# Patient Record
Sex: FEMALE | Race: WHITE | Employment: OTHER | URBAN - METROPOLITAN AREA
[De-identification: names, ages, dates, MRNs, and addresses within clinical notes are randomized per-mention and may not be internally consistent; named-entity substitution may affect disease eponyms.]

---

## 2019-09-25 ENCOUNTER — HOSPITAL ENCOUNTER (OUTPATIENT)
Age: 69
Discharge: HOME | End: 2019-09-25
Payer: COMMERCIAL

## 2019-09-25 LAB
ALBUMIN SERPL-MCNC: 3.7 G/DL (ref 3.5–5.2)
ALP BLD-CCNC: 106 U/L (ref 35–104)
ALT SERPL-CCNC: 9 U/L (ref 0–32)
ANION GAP SERPL CALCULATED.3IONS-SCNC: 14 MMOL/L (ref 7–16)
AST SERPL-CCNC: 13 U/L (ref 0–31)
BASOPHILS ABSOLUTE: 0.05 E9/L (ref 0–0.2)
BASOPHILS RELATIVE PERCENT: 0.3 % (ref 0–2)
BILIRUB SERPL-MCNC: <0.2 MG/DL (ref 0–1.2)
BUN BLDV-MCNC: 15 MG/DL (ref 8–23)
CALCIUM SERPL-MCNC: 10.4 MG/DL (ref 8.6–10.2)
CHLORIDE BLD-SCNC: 99 MMOL/L (ref 98–107)
CHOLESTEROL, TOTAL: 140 MG/DL (ref 0–199)
CO2: 24 MMOL/L (ref 22–29)
CREAT SERPL-MCNC: 0.5 MG/DL (ref 0.5–1)
EOSINOPHILS ABSOLUTE: 0.17 E9/L (ref 0.05–0.5)
EOSINOPHILS RELATIVE PERCENT: 1.1 % (ref 0–6)
GFR AFRICAN AMERICAN: >60
GFR NON-AFRICAN AMERICAN: >60 ML/MIN/1.73
GLUCOSE BLD-MCNC: 94 MG/DL (ref 74–99)
HCT VFR BLD CALC: 39.8 % (ref 34–48)
HDLC SERPL-MCNC: 41 MG/DL
HEMOGLOBIN: 12.9 G/DL (ref 11.5–15.5)
IMMATURE GRANULOCYTES #: 0.12 E9/L
IMMATURE GRANULOCYTES %: 0.8 % (ref 0–5)
LDL CHOLESTEROL CALCULATED: 67 MG/DL (ref 0–99)
LYMPHOCYTES ABSOLUTE: 2.68 E9/L (ref 1.5–4)
LYMPHOCYTES RELATIVE PERCENT: 17.2 % (ref 20–42)
MCH RBC QN AUTO: 33.4 PG (ref 26–35)
MCHC RBC AUTO-ENTMCNC: 32.4 % (ref 32–34.5)
MCV RBC AUTO: 103.1 FL (ref 80–99.9)
MONOCYTES ABSOLUTE: 0.52 E9/L (ref 0.1–0.95)
MONOCYTES RELATIVE PERCENT: 3.3 % (ref 2–12)
NEUTROPHILS ABSOLUTE: 12.01 E9/L (ref 1.8–7.3)
NEUTROPHILS RELATIVE PERCENT: 77.3 % (ref 43–80)
PDW BLD-RTO: 12.2 FL (ref 11.5–15)
PLATELET # BLD: 614 E9/L (ref 130–450)
PMV BLD AUTO: 8.6 FL (ref 7–12)
POTASSIUM SERPL-SCNC: 3.9 MMOL/L (ref 3.5–5)
RBC # BLD: 3.86 E12/L (ref 3.5–5.5)
SODIUM BLD-SCNC: 137 MMOL/L (ref 132–146)
T4 FREE: 0.97 NG/DL (ref 0.93–1.7)
TOTAL PROTEIN: 8.1 G/DL (ref 6.4–8.3)
TRIGL SERPL-MCNC: 161 MG/DL (ref 0–149)
TSH SERPL DL<=0.05 MIU/L-ACNC: 1.37 UIU/ML (ref 0.27–4.2)
VLDLC SERPL CALC-MCNC: 32 MG/DL
WBC # BLD: 15.6 E9/L (ref 4.5–11.5)

## 2019-09-25 PROCEDURE — 84439 ASSAY OF FREE THYROXINE: CPT

## 2019-09-25 PROCEDURE — 80061 LIPID PANEL: CPT

## 2019-09-25 PROCEDURE — 80053 COMPREHEN METABOLIC PANEL: CPT

## 2019-09-25 PROCEDURE — 36415 COLL VENOUS BLD VENIPUNCTURE: CPT

## 2019-09-25 PROCEDURE — 85025 COMPLETE CBC W/AUTO DIFF WBC: CPT

## 2019-09-25 PROCEDURE — 84443 ASSAY THYROID STIM HORMONE: CPT

## 2019-10-04 ENCOUNTER — HOSPITAL ENCOUNTER (OUTPATIENT)
Dept: GENERAL RADIOLOGY | Age: 69
Discharge: HOME | End: 2019-10-06
Payer: COMMERCIAL

## 2019-10-04 ENCOUNTER — HOSPITAL ENCOUNTER (OUTPATIENT)
Age: 69
Discharge: HOME | End: 2019-10-06
Payer: COMMERCIAL

## 2019-10-04 ENCOUNTER — HOSPITAL ENCOUNTER (OUTPATIENT)
Dept: WOUND CARE | Age: 69
Discharge: HOME | End: 2019-10-04
Payer: COMMERCIAL

## 2019-10-04 VITALS
BODY MASS INDEX: 17.99 KG/M2 | RESPIRATION RATE: 18 BRPM | HEIGHT: 65 IN | SYSTOLIC BLOOD PRESSURE: 106 MMHG | WEIGHT: 108 LBS | HEART RATE: 70 BPM | TEMPERATURE: 98.9 F | DIASTOLIC BLOOD PRESSURE: 70 MMHG

## 2019-10-04 DIAGNOSIS — L03.011 CELLULITIS OF FINGER OF RIGHT HAND: Primary | ICD-10-CM

## 2019-10-04 DIAGNOSIS — R05.9 COUGH: ICD-10-CM

## 2019-10-04 DIAGNOSIS — L03.011 CELLULITIS OF FINGER OF RIGHT HAND: ICD-10-CM

## 2019-10-04 PROCEDURE — 73130 X-RAY EXAM OF HAND: CPT

## 2019-10-04 PROCEDURE — 11042 DBRDMT SUBQ TIS 1ST 20SQCM/<: CPT

## 2019-10-04 PROCEDURE — 87070 CULTURE OTHR SPECIMN AEROBIC: CPT

## 2019-10-04 PROCEDURE — 99203 OFFICE O/P NEW LOW 30 MIN: CPT | Performed by: FAMILY MEDICINE

## 2019-10-04 PROCEDURE — 99213 OFFICE O/P EST LOW 20 MIN: CPT

## 2019-10-04 PROCEDURE — 11042 DBRDMT SUBQ TIS 1ST 20SQCM/<: CPT | Performed by: FAMILY MEDICINE

## 2019-10-04 PROCEDURE — 87075 CULTR BACTERIA EXCEPT BLOOD: CPT

## 2019-10-04 RX ORDER — BUPRENORPHINE HYDROCHLORIDE, NALOXONE HYDROCHLORIDE 8; 2 MG/1; MG/1
1 FILM, SOLUBLE BUCCAL; SUBLINGUAL 3 TIMES DAILY
COMMUNITY

## 2019-10-04 ASSESSMENT — PAIN SCALES - GENERAL: PAINLEVEL_OUTOF10: 8

## 2019-10-04 ASSESSMENT — PAIN DESCRIPTION - ONSET: ONSET: ON-GOING

## 2019-10-04 ASSESSMENT — PAIN DESCRIPTION - PAIN TYPE: TYPE: ACUTE PAIN

## 2019-10-04 ASSESSMENT — PAIN DESCRIPTION - LOCATION: LOCATION: FINGER (COMMENT WHICH ONE)

## 2019-10-04 ASSESSMENT — PAIN DESCRIPTION - FREQUENCY: FREQUENCY: CONTINUOUS

## 2019-10-04 ASSESSMENT — PAIN DESCRIPTION - DESCRIPTORS: DESCRIPTORS: ACHING;CRUSHING;DISCOMFORT

## 2019-10-04 ASSESSMENT — PAIN DESCRIPTION - PROGRESSION: CLINICAL_PROGRESSION: NOT CHANGED

## 2019-10-04 ASSESSMENT — PAIN DESCRIPTION - ORIENTATION: ORIENTATION: RIGHT

## 2019-10-06 LAB — WOUND/ABSCESS: NORMAL

## 2019-10-09 LAB — ANAEROBIC CULTURE: NORMAL

## 2019-10-11 ENCOUNTER — HOSPITAL ENCOUNTER (OUTPATIENT)
Dept: WOUND CARE | Age: 69
Discharge: HOME | DRG: 710 | End: 2019-10-11
Payer: COMMERCIAL

## 2019-10-11 ENCOUNTER — APPOINTMENT (OUTPATIENT)
Dept: GENERAL RADIOLOGY | Age: 69
DRG: 710 | End: 2019-10-11
Payer: COMMERCIAL

## 2019-10-11 ENCOUNTER — TELEPHONE (OUTPATIENT)
Dept: ORTHOPEDIC SURGERY | Age: 69
End: 2019-10-11

## 2019-10-11 ENCOUNTER — HOSPITAL ENCOUNTER (INPATIENT)
Age: 69
LOS: 4 days | Discharge: HOME | DRG: 710 | End: 2019-10-15
Attending: EMERGENCY MEDICINE | Admitting: INTERNAL MEDICINE
Payer: COMMERCIAL

## 2019-10-11 VITALS
SYSTOLIC BLOOD PRESSURE: 118 MMHG | TEMPERATURE: 98.1 F | RESPIRATION RATE: 18 BRPM | HEART RATE: 90 BPM | DIASTOLIC BLOOD PRESSURE: 78 MMHG

## 2019-10-11 DIAGNOSIS — M65.9 TENOSYNOVITIS: Primary | ICD-10-CM

## 2019-10-11 PROBLEM — M65.90 TENOSYNOVITIS: Status: ACTIVE | Noted: 2019-10-11

## 2019-10-11 LAB
ABO/RH: NORMAL
ALBUMIN SERPL-MCNC: 4 G/DL (ref 3.5–5.2)
ALP BLD-CCNC: 91 U/L (ref 35–104)
ALT SERPL-CCNC: 10 U/L (ref 0–32)
ANION GAP SERPL CALCULATED.3IONS-SCNC: 11 MMOL/L (ref 7–16)
ANTIBODY IDENTIFICATION: NORMAL
ANTIBODY SCREEN: NORMAL
APTT: 38.2 SEC (ref 24.5–35.1)
AST SERPL-CCNC: 15 U/L (ref 0–31)
BASOPHILS ABSOLUTE: 0.04 E9/L (ref 0–0.2)
BASOPHILS RELATIVE PERCENT: 0.4 % (ref 0–2)
BILIRUB SERPL-MCNC: <0.2 MG/DL (ref 0–1.2)
BUN BLDV-MCNC: 15 MG/DL (ref 8–23)
C ANTIGEN: NORMAL
C-REACTIVE PROTEIN: 1.1 MG/DL (ref 0–0.4)
CALCIUM SERPL-MCNC: 10.3 MG/DL (ref 8.6–10.2)
CHLORIDE BLD-SCNC: 103 MMOL/L (ref 98–107)
CHP ED QC CHECK: YES
CO2: 25 MMOL/L (ref 22–29)
CREAT SERPL-MCNC: 0.6 MG/DL (ref 0.5–1)
DAT POLYSPECIFIC: NORMAL
DR. NOTIFY: NORMAL
E ANTIGEN: NORMAL
EOSINOPHILS ABSOLUTE: 0.09 E9/L (ref 0.05–0.5)
EOSINOPHILS RELATIVE PERCENT: 0.9 % (ref 0–6)
GFR AFRICAN AMERICAN: >60
GFR NON-AFRICAN AMERICAN: >60 ML/MIN/1.73
GLUCOSE BLD-MCNC: 118 MG/DL (ref 74–99)
GLUCOSE BLD-MCNC: 155 MG/DL
HCT VFR BLD CALC: 36.8 % (ref 34–48)
HEMOGLOBIN: 12.1 G/DL (ref 11.5–15.5)
IMMATURE GRANULOCYTES #: 0.04 E9/L
IMMATURE GRANULOCYTES %: 0.4 % (ref 0–5)
INR BLD: 1
LYMPHOCYTES ABSOLUTE: 2.48 E9/L (ref 1.5–4)
LYMPHOCYTES RELATIVE PERCENT: 24.5 % (ref 20–42)
MCH RBC QN AUTO: 33.7 PG (ref 26–35)
MCHC RBC AUTO-ENTMCNC: 32.9 % (ref 32–34.5)
MCV RBC AUTO: 102.5 FL (ref 80–99.9)
METER GLUCOSE: 155 MG/DL (ref 74–99)
MONOCYTES ABSOLUTE: 0.74 E9/L (ref 0.1–0.95)
MONOCYTES RELATIVE PERCENT: 7.3 % (ref 2–12)
NEUTROPHILS ABSOLUTE: 6.73 E9/L (ref 1.8–7.3)
NEUTROPHILS RELATIVE PERCENT: 66.5 % (ref 43–80)
PDW BLD-RTO: 12.9 FL (ref 11.5–15)
PLATELET # BLD: 433 E9/L (ref 130–450)
PMV BLD AUTO: 8.8 FL (ref 7–12)
POTASSIUM SERPL-SCNC: 4.1 MMOL/L (ref 3.5–5)
PROTHROMBIN TIME: 11.4 SEC (ref 9.3–12.4)
RBC # BLD: 3.59 E12/L (ref 3.5–5.5)
SEDIMENTATION RATE, ERYTHROCYTE: 69 MM/HR (ref 0–20)
SODIUM BLD-SCNC: 139 MMOL/L (ref 132–146)
TOTAL CK: 28 U/L (ref 20–180)
TOTAL PROTEIN: 7.5 G/DL (ref 6.4–8.3)
WBC # BLD: 10.1 E9/L (ref 4.5–11.5)

## 2019-10-11 PROCEDURE — 6360000002 HC RX W HCPCS: Performed by: STUDENT IN AN ORGANIZED HEALTH CARE EDUCATION/TRAINING PROGRAM

## 2019-10-11 PROCEDURE — 6370000000 HC RX 637 (ALT 250 FOR IP): Performed by: INTERNAL MEDICINE

## 2019-10-11 PROCEDURE — 93005 ELECTROCARDIOGRAM TRACING: CPT

## 2019-10-11 PROCEDURE — 73130 X-RAY EXAM OF HAND: CPT

## 2019-10-11 PROCEDURE — 99212 OFFICE O/P EST SF 10 MIN: CPT

## 2019-10-11 PROCEDURE — 86850 RBC ANTIBODY SCREEN: CPT

## 2019-10-11 PROCEDURE — 2580000003 HC RX 258: Performed by: INTERNAL MEDICINE

## 2019-10-11 PROCEDURE — 86922 COMPATIBILITY TEST ANTIGLOB: CPT

## 2019-10-11 PROCEDURE — 36415 COLL VENOUS BLD VENIPUNCTURE: CPT

## 2019-10-11 PROCEDURE — 82550 ASSAY OF CK (CPK): CPT

## 2019-10-11 PROCEDURE — 85610 PROTHROMBIN TIME: CPT

## 2019-10-11 PROCEDURE — 96365 THER/PROPH/DIAG IV INF INIT: CPT

## 2019-10-11 PROCEDURE — 87070 CULTURE OTHR SPECIMN AEROBIC: CPT

## 2019-10-11 PROCEDURE — 86905 BLOOD TYPING RBC ANTIGENS: CPT

## 2019-10-11 PROCEDURE — 96375 TX/PRO/DX INJ NEW DRUG ADDON: CPT

## 2019-10-11 PROCEDURE — 86880 COOMBS TEST DIRECT: CPT

## 2019-10-11 PROCEDURE — 85025 COMPLETE CBC W/AUTO DIFF WBC: CPT

## 2019-10-11 PROCEDURE — 99213 OFFICE O/P EST LOW 20 MIN: CPT | Performed by: FAMILY MEDICINE

## 2019-10-11 PROCEDURE — 2580000003 HC RX 258: Performed by: STUDENT IN AN ORGANIZED HEALTH CARE EDUCATION/TRAINING PROGRAM

## 2019-10-11 PROCEDURE — 85730 THROMBOPLASTIN TIME PARTIAL: CPT

## 2019-10-11 PROCEDURE — 85651 RBC SED RATE NONAUTOMATED: CPT

## 2019-10-11 PROCEDURE — 87040 BLOOD CULTURE FOR BACTERIA: CPT

## 2019-10-11 PROCEDURE — 86870 RBC ANTIBODY IDENTIFICATION: CPT

## 2019-10-11 PROCEDURE — 87075 CULTR BACTERIA EXCEPT BLOOD: CPT

## 2019-10-11 PROCEDURE — 1200000000 HC SEMI PRIVATE

## 2019-10-11 PROCEDURE — 80053 COMPREHEN METABOLIC PANEL: CPT

## 2019-10-11 PROCEDURE — 82962 GLUCOSE BLOOD TEST: CPT

## 2019-10-11 PROCEDURE — 96376 TX/PRO/DX INJ SAME DRUG ADON: CPT

## 2019-10-11 PROCEDURE — 99285 EMERGENCY DEPT VISIT HI MDM: CPT

## 2019-10-11 PROCEDURE — 6360000002 HC RX W HCPCS: Performed by: INTERNAL MEDICINE

## 2019-10-11 PROCEDURE — 86140 C-REACTIVE PROTEIN: CPT

## 2019-10-11 PROCEDURE — 86901 BLOOD TYPING SEROLOGIC RH(D): CPT

## 2019-10-11 PROCEDURE — 96367 TX/PROPH/DG ADDL SEQ IV INF: CPT

## 2019-10-11 PROCEDURE — 86900 BLOOD TYPING SEROLOGIC ABO: CPT

## 2019-10-11 PROCEDURE — 99223 1ST HOSP IP/OBS HIGH 75: CPT | Performed by: ORTHOPAEDIC SURGERY

## 2019-10-11 RX ORDER — MORPHINE SULFATE 4 MG/ML
4 INJECTION, SOLUTION INTRAMUSCULAR; INTRAVENOUS ONCE
Status: COMPLETED | OUTPATIENT
Start: 2019-10-11 | End: 2019-10-11

## 2019-10-11 RX ORDER — SODIUM CHLORIDE 0.9 % (FLUSH) 0.9 %
10 SYRINGE (ML) INJECTION EVERY 12 HOURS SCHEDULED
Status: DISCONTINUED | OUTPATIENT
Start: 2019-10-11 | End: 2019-10-15 | Stop reason: HOSPADM

## 2019-10-11 RX ORDER — DEXTROSE MONOHYDRATE 50 MG/ML
100 INJECTION, SOLUTION INTRAVENOUS PRN
Status: DISCONTINUED | OUTPATIENT
Start: 2019-10-11 | End: 2019-10-15 | Stop reason: HOSPADM

## 2019-10-11 RX ORDER — SODIUM CHLORIDE 0.9 % (FLUSH) 0.9 %
10 SYRINGE (ML) INJECTION PRN
Status: DISCONTINUED | OUTPATIENT
Start: 2019-10-11 | End: 2019-10-15 | Stop reason: HOSPADM

## 2019-10-11 RX ORDER — DEXTROSE MONOHYDRATE 25 G/50ML
12.5 INJECTION, SOLUTION INTRAVENOUS PRN
Status: DISCONTINUED | OUTPATIENT
Start: 2019-10-11 | End: 2019-10-15 | Stop reason: HOSPADM

## 2019-10-11 RX ORDER — BUPRENORPHINE HYDROCHLORIDE AND NALOXONE HYDROCHLORIDE DIHYDRATE 8; 2 MG/1; MG/1
1 TABLET SUBLINGUAL 2 TIMES DAILY
Status: DISCONTINUED | OUTPATIENT
Start: 2019-10-11 | End: 2019-10-15 | Stop reason: HOSPADM

## 2019-10-11 RX ORDER — ACETAMINOPHEN 325 MG/1
650 TABLET ORAL EVERY 4 HOURS PRN
Status: DISCONTINUED | OUTPATIENT
Start: 2019-10-11 | End: 2019-10-15 | Stop reason: HOSPADM

## 2019-10-11 RX ORDER — NICOTINE POLACRILEX 4 MG
15 LOZENGE BUCCAL PRN
Status: DISCONTINUED | OUTPATIENT
Start: 2019-10-11 | End: 2019-10-15 | Stop reason: HOSPADM

## 2019-10-11 RX ORDER — MORPHINE SULFATE 2 MG/ML
2 INJECTION, SOLUTION INTRAMUSCULAR; INTRAVENOUS EVERY 6 HOURS PRN
Status: DISCONTINUED | OUTPATIENT
Start: 2019-10-11 | End: 2019-10-14 | Stop reason: CLARIF

## 2019-10-11 RX ORDER — LIDOCAINE HYDROCHLORIDE 20 MG/ML
JELLY TOPICAL ONCE
Status: DISCONTINUED | OUTPATIENT
Start: 2019-10-11 | End: 2019-10-12 | Stop reason: HOSPADM

## 2019-10-11 RX ORDER — OXYCODONE HYDROCHLORIDE AND ACETAMINOPHEN 5; 325 MG/1; MG/1
1 TABLET ORAL ONCE
Status: DISCONTINUED | OUTPATIENT
Start: 2019-10-11 | End: 2019-10-11

## 2019-10-11 RX ADMIN — BUPRENORPHINE AND NALOXONE 1 TABLET: 8; 2 TABLET SUBLINGUAL at 22:09

## 2019-10-11 RX ADMIN — PIPERACILLIN AND TAZOBACTAM 3.38 G: 3; .375 INJECTION, POWDER, FOR SOLUTION INTRAVENOUS at 12:40

## 2019-10-11 RX ADMIN — MORPHINE SULFATE 4 MG: 4 INJECTION INTRAVENOUS at 18:37

## 2019-10-11 RX ADMIN — MORPHINE SULFATE 4 MG: 4 INJECTION INTRAVENOUS at 14:24

## 2019-10-11 RX ADMIN — Medication 10 ML: at 22:52

## 2019-10-11 RX ADMIN — ACETAMINOPHEN 650 MG: 325 TABLET, FILM COATED ORAL at 22:52

## 2019-10-11 RX ADMIN — PIPERACILLIN AND TAZOBACTAM 3.38 G: 3; .375 INJECTION, POWDER, FOR SOLUTION INTRAVENOUS at 22:52

## 2019-10-11 RX ADMIN — MORPHINE SULFATE 4 MG: 4 INJECTION INTRAVENOUS at 12:38

## 2019-10-11 RX ADMIN — VANCOMYCIN HYDROCHLORIDE 1000 MG: 1 INJECTION, POWDER, LYOPHILIZED, FOR SOLUTION INTRAVENOUS at 13:19

## 2019-10-11 ASSESSMENT — ENCOUNTER SYMPTOMS
VOMITING: 0
SINUS PRESSURE: 0
SORE THROAT: 0
EYE DISCHARGE: 0
DIARRHEA: 0
NAUSEA: 0
EYE PAIN: 0
ABDOMINAL DISTENTION: 0
SHORTNESS OF BREATH: 0
WHEEZING: 0
COUGH: 0
EYE REDNESS: 0
BACK PAIN: 0

## 2019-10-11 ASSESSMENT — PAIN SCALES - GENERAL
PAINLEVEL_OUTOF10: 8
PAINLEVEL_OUTOF10: 10
PAINLEVEL_OUTOF10: 8
PAINLEVEL_OUTOF10: 9
PAINLEVEL_OUTOF10: 10
PAINLEVEL_OUTOF10: 9

## 2019-10-11 ASSESSMENT — PAIN DESCRIPTION - PAIN TYPE
TYPE: ACUTE PAIN
TYPE: CHRONIC PAIN
TYPE: ACUTE PAIN

## 2019-10-11 ASSESSMENT — PAIN DESCRIPTION - ORIENTATION
ORIENTATION: RIGHT

## 2019-10-11 ASSESSMENT — PAIN DESCRIPTION - DESCRIPTORS
DESCRIPTORS: ACHING;DISCOMFORT;THROBBING
DESCRIPTORS: ACHING;DISCOMFORT;CONSTANT
DESCRIPTORS: CONSTANT;SHOOTING

## 2019-10-11 ASSESSMENT — PAIN DESCRIPTION - LOCATION
LOCATION: FINGER (COMMENT WHICH ONE)
LOCATION: FINGER (COMMENT WHICH ONE)
LOCATION: HAND

## 2019-10-11 ASSESSMENT — PAIN DESCRIPTION - FREQUENCY: FREQUENCY: CONTINUOUS

## 2019-10-11 ASSESSMENT — PAIN - FUNCTIONAL ASSESSMENT: PAIN_FUNCTIONAL_ASSESSMENT: PREVENTS OR INTERFERES SOME ACTIVE ACTIVITIES AND ADLS

## 2019-10-12 ENCOUNTER — ANESTHESIA EVENT (OUTPATIENT)
Dept: OPERATING ROOM | Age: 69
DRG: 710 | End: 2019-10-12
Payer: COMMERCIAL

## 2019-10-12 ENCOUNTER — ANESTHESIA (OUTPATIENT)
Dept: OPERATING ROOM | Age: 69
DRG: 710 | End: 2019-10-12
Payer: COMMERCIAL

## 2019-10-12 ENCOUNTER — APPOINTMENT (OUTPATIENT)
Dept: GENERAL RADIOLOGY | Age: 69
DRG: 710 | End: 2019-10-12
Payer: COMMERCIAL

## 2019-10-12 VITALS
DIASTOLIC BLOOD PRESSURE: 51 MMHG | RESPIRATION RATE: 22 BRPM | OXYGEN SATURATION: 98 % | SYSTOLIC BLOOD PRESSURE: 94 MMHG

## 2019-10-12 LAB
ANION GAP SERPL CALCULATED.3IONS-SCNC: 8 MMOL/L (ref 7–16)
BUN BLDV-MCNC: 19 MG/DL (ref 8–23)
CALCIUM SERPL-MCNC: 9.5 MG/DL (ref 8.6–10.2)
CHLORIDE BLD-SCNC: 104 MMOL/L (ref 98–107)
CHOLESTEROL, TOTAL: 153 MG/DL (ref 0–199)
CO2: 26 MMOL/L (ref 22–29)
CREAT SERPL-MCNC: 0.5 MG/DL (ref 0.5–1)
GFR AFRICAN AMERICAN: >60
GFR NON-AFRICAN AMERICAN: >60 ML/MIN/1.73
GLUCOSE BLD-MCNC: 98 MG/DL (ref 74–99)
HBA1C MFR BLD: 6.3 % (ref 4–5.6)
HCT VFR BLD CALC: 32.5 % (ref 34–48)
HDLC SERPL-MCNC: 40 MG/DL
HEMOGLOBIN: 10.5 G/DL (ref 11.5–15.5)
LDL CHOLESTEROL CALCULATED: 82 MG/DL (ref 0–99)
MAGNESIUM: 2.1 MG/DL (ref 1.6–2.6)
MCH RBC QN AUTO: 33.4 PG (ref 26–35)
MCHC RBC AUTO-ENTMCNC: 32.3 % (ref 32–34.5)
MCV RBC AUTO: 103.5 FL (ref 80–99.9)
METER GLUCOSE: 121 MG/DL (ref 74–99)
METER GLUCOSE: 90 MG/DL (ref 74–99)
PDW BLD-RTO: 13 FL (ref 11.5–15)
PHOSPHORUS: 3.9 MG/DL (ref 2.5–4.5)
PLATELET # BLD: 387 E9/L (ref 130–450)
PMV BLD AUTO: 8.9 FL (ref 7–12)
POTASSIUM SERPL-SCNC: 4 MMOL/L (ref 3.5–5)
RBC # BLD: 3.14 E12/L (ref 3.5–5.5)
SODIUM BLD-SCNC: 138 MMOL/L (ref 132–146)
TRIGL SERPL-MCNC: 156 MG/DL (ref 0–149)
TSH SERPL DL<=0.05 MIU/L-ACNC: 1.9 UIU/ML (ref 0.27–4.2)
VLDLC SERPL CALC-MCNC: 31 MG/DL
WBC # BLD: 8 E9/L (ref 4.5–11.5)

## 2019-10-12 PROCEDURE — 85027 COMPLETE CBC AUTOMATED: CPT

## 2019-10-12 PROCEDURE — 83735 ASSAY OF MAGNESIUM: CPT

## 2019-10-12 PROCEDURE — 6360000002 HC RX W HCPCS: Performed by: INTERNAL MEDICINE

## 2019-10-12 PROCEDURE — 0JBJ0ZZ EXCISION OF RIGHT HAND SUBCUTANEOUS TISSUE AND FASCIA, OPEN APPROACH: ICD-10-PCS | Performed by: ORTHOPAEDIC SURGERY

## 2019-10-12 PROCEDURE — 87075 CULTR BACTERIA EXCEPT BLOOD: CPT

## 2019-10-12 PROCEDURE — 3600000012 HC SURGERY LEVEL 2 ADDTL 15MIN: Performed by: ORTHOPAEDIC SURGERY

## 2019-10-12 PROCEDURE — 3600000002 HC SURGERY LEVEL 2 BASE: Performed by: ORTHOPAEDIC SURGERY

## 2019-10-12 PROCEDURE — 87070 CULTURE OTHR SPECIMN AEROBIC: CPT

## 2019-10-12 PROCEDURE — 6370000000 HC RX 637 (ALT 250 FOR IP): Performed by: STUDENT IN AN ORGANIZED HEALTH CARE EDUCATION/TRAINING PROGRAM

## 2019-10-12 PROCEDURE — 26011 DRAINAGE OF FINGER ABSCESS: CPT | Performed by: ORTHOPAEDIC SURGERY

## 2019-10-12 PROCEDURE — 7100000000 HC PACU RECOVERY - FIRST 15 MIN: Performed by: ORTHOPAEDIC SURGERY

## 2019-10-12 PROCEDURE — 2580000003 HC RX 258: Performed by: INTERNAL MEDICINE

## 2019-10-12 PROCEDURE — 1200000000 HC SEMI PRIVATE

## 2019-10-12 PROCEDURE — 3700000001 HC ADD 15 MINUTES (ANESTHESIA): Performed by: ORTHOPAEDIC SURGERY

## 2019-10-12 PROCEDURE — 87205 SMEAR GRAM STAIN: CPT

## 2019-10-12 PROCEDURE — 7100000001 HC PACU RECOVERY - ADDTL 15 MIN: Performed by: ORTHOPAEDIC SURGERY

## 2019-10-12 PROCEDURE — 71045 X-RAY EXAM CHEST 1 VIEW: CPT

## 2019-10-12 PROCEDURE — 2580000003 HC RX 258: Performed by: STUDENT IN AN ORGANIZED HEALTH CARE EDUCATION/TRAINING PROGRAM

## 2019-10-12 PROCEDURE — 36415 COLL VENOUS BLD VENIPUNCTURE: CPT

## 2019-10-12 PROCEDURE — 26020 DRAIN HAND TENDON SHEATH: CPT | Performed by: ORTHOPAEDIC SURGERY

## 2019-10-12 PROCEDURE — 80061 LIPID PANEL: CPT

## 2019-10-12 PROCEDURE — 3700000000 HC ANESTHESIA ATTENDED CARE: Performed by: ORTHOPAEDIC SURGERY

## 2019-10-12 PROCEDURE — 84443 ASSAY THYROID STIM HORMONE: CPT

## 2019-10-12 PROCEDURE — 6370000000 HC RX 637 (ALT 250 FOR IP): Performed by: INTERNAL MEDICINE

## 2019-10-12 PROCEDURE — 83036 HEMOGLOBIN GLYCOSYLATED A1C: CPT

## 2019-10-12 PROCEDURE — 6360000002 HC RX W HCPCS: Performed by: STUDENT IN AN ORGANIZED HEALTH CARE EDUCATION/TRAINING PROGRAM

## 2019-10-12 PROCEDURE — 2580000003 HC RX 258: Performed by: NURSE ANESTHETIST, CERTIFIED REGISTERED

## 2019-10-12 PROCEDURE — 2709999900 HC NON-CHARGEABLE SUPPLY: Performed by: ORTHOPAEDIC SURGERY

## 2019-10-12 PROCEDURE — 99232 SBSQ HOSP IP/OBS MODERATE 35: CPT | Performed by: ORTHOPAEDIC SURGERY

## 2019-10-12 PROCEDURE — 6360000002 HC RX W HCPCS: Performed by: NURSE ANESTHETIST, CERTIFIED REGISTERED

## 2019-10-12 PROCEDURE — 84100 ASSAY OF PHOSPHORUS: CPT

## 2019-10-12 PROCEDURE — 2500000003 HC RX 250 WO HCPCS: Performed by: ORTHOPAEDIC SURGERY

## 2019-10-12 PROCEDURE — 82962 GLUCOSE BLOOD TEST: CPT

## 2019-10-12 PROCEDURE — 80048 BASIC METABOLIC PNL TOTAL CA: CPT

## 2019-10-12 PROCEDURE — 6360000002 HC RX W HCPCS

## 2019-10-12 RX ORDER — PROPOFOL 10 MG/ML
INJECTION, EMULSION INTRAVENOUS CONTINUOUS PRN
Status: DISCONTINUED | OUTPATIENT
Start: 2019-10-12 | End: 2019-10-12 | Stop reason: SDUPTHER

## 2019-10-12 RX ORDER — MIDAZOLAM HYDROCHLORIDE 1 MG/ML
1 INJECTION INTRAMUSCULAR; INTRAVENOUS ONCE
Status: COMPLETED | OUTPATIENT
Start: 2019-10-12 | End: 2019-10-12

## 2019-10-12 RX ORDER — DIPHENHYDRAMINE HYDROCHLORIDE 50 MG/ML
25 INJECTION INTRAMUSCULAR; INTRAVENOUS EVERY 6 HOURS PRN
Status: DISCONTINUED | OUTPATIENT
Start: 2019-10-12 | End: 2019-10-15 | Stop reason: HOSPADM

## 2019-10-12 RX ORDER — BUPIVACAINE HYDROCHLORIDE 5 MG/ML
INJECTION, SOLUTION PERINEURAL PRN
Status: DISCONTINUED | OUTPATIENT
Start: 2019-10-12 | End: 2019-10-12 | Stop reason: ALTCHOICE

## 2019-10-12 RX ORDER — SODIUM CHLORIDE 0.9 % (FLUSH) 0.9 %
10 SYRINGE (ML) INJECTION EVERY 12 HOURS SCHEDULED
Status: DISCONTINUED | OUTPATIENT
Start: 2019-10-12 | End: 2019-10-12 | Stop reason: SDUPTHER

## 2019-10-12 RX ORDER — SODIUM CHLORIDE 0.9 % (FLUSH) 0.9 %
10 SYRINGE (ML) INJECTION PRN
Status: DISCONTINUED | OUTPATIENT
Start: 2019-10-12 | End: 2019-10-12 | Stop reason: SDUPTHER

## 2019-10-12 RX ORDER — HYDROCODONE BITARTRATE AND ACETAMINOPHEN 5; 325 MG/1; MG/1
2 TABLET ORAL EVERY 4 HOURS PRN
Status: DISCONTINUED | OUTPATIENT
Start: 2019-10-12 | End: 2019-10-12 | Stop reason: SDUPTHER

## 2019-10-12 RX ORDER — ONDANSETRON 2 MG/ML
4 INJECTION INTRAMUSCULAR; INTRAVENOUS
Status: DISCONTINUED | OUTPATIENT
Start: 2019-10-12 | End: 2019-10-12

## 2019-10-12 RX ORDER — PROPOFOL 10 MG/ML
INJECTION, EMULSION INTRAVENOUS PRN
Status: DISCONTINUED | OUTPATIENT
Start: 2019-10-12 | End: 2019-10-12 | Stop reason: SDUPTHER

## 2019-10-12 RX ORDER — SODIUM CHLORIDE, SODIUM LACTATE, POTASSIUM CHLORIDE, CALCIUM CHLORIDE 600; 310; 30; 20 MG/100ML; MG/100ML; MG/100ML; MG/100ML
INJECTION, SOLUTION INTRAVENOUS CONTINUOUS PRN
Status: DISCONTINUED | OUTPATIENT
Start: 2019-10-12 | End: 2019-10-12 | Stop reason: SDUPTHER

## 2019-10-12 RX ORDER — MIDAZOLAM HYDROCHLORIDE 1 MG/ML
INJECTION INTRAMUSCULAR; INTRAVENOUS PRN
Status: DISCONTINUED | OUTPATIENT
Start: 2019-10-12 | End: 2019-10-12 | Stop reason: SDUPTHER

## 2019-10-12 RX ORDER — MIDAZOLAM HYDROCHLORIDE 1 MG/ML
INJECTION INTRAMUSCULAR; INTRAVENOUS
Status: COMPLETED
Start: 2019-10-12 | End: 2019-10-12

## 2019-10-12 RX ORDER — LIDOCAINE HYDROCHLORIDE 20 MG/ML
INJECTION, SOLUTION INTRAVENOUS PRN
Status: DISCONTINUED | OUTPATIENT
Start: 2019-10-12 | End: 2019-10-12 | Stop reason: SDUPTHER

## 2019-10-12 RX ORDER — HYDROCODONE BITARTRATE AND ACETAMINOPHEN 5; 325 MG/1; MG/1
1 TABLET ORAL EVERY 4 HOURS PRN
Status: DISCONTINUED | OUTPATIENT
Start: 2019-10-12 | End: 2019-10-12 | Stop reason: SDUPTHER

## 2019-10-12 RX ORDER — ONDANSETRON 2 MG/ML
4 INJECTION INTRAMUSCULAR; INTRAVENOUS EVERY 6 HOURS PRN
Status: DISCONTINUED | OUTPATIENT
Start: 2019-10-12 | End: 2019-10-15 | Stop reason: HOSPADM

## 2019-10-12 RX ORDER — DOCUSATE SODIUM 100 MG/1
100 CAPSULE, LIQUID FILLED ORAL 2 TIMES DAILY
Status: DISCONTINUED | OUTPATIENT
Start: 2019-10-12 | End: 2019-10-15 | Stop reason: HOSPADM

## 2019-10-12 RX ADMIN — WATER 2 G: 1 INJECTION INTRAMUSCULAR; INTRAVENOUS; SUBCUTANEOUS at 10:08

## 2019-10-12 RX ADMIN — MIDAZOLAM 1 MG: 1 INJECTION INTRAMUSCULAR; INTRAVENOUS at 16:31

## 2019-10-12 RX ADMIN — VANCOMYCIN HYDROCHLORIDE 750 MG: 10 INJECTION, POWDER, LYOPHILIZED, FOR SOLUTION INTRAVENOUS at 14:05

## 2019-10-12 RX ADMIN — ACETAMINOPHEN 650 MG: 325 TABLET, FILM COATED ORAL at 02:46

## 2019-10-12 RX ADMIN — LIDOCAINE HYDROCHLORIDE 20 MG: 20 INJECTION, SOLUTION INTRAVENOUS at 16:38

## 2019-10-12 RX ADMIN — PROPOFOL 100 MCG/KG/MIN: 10 INJECTION, EMULSION INTRAVENOUS at 16:59

## 2019-10-12 RX ADMIN — VANCOMYCIN HYDROCHLORIDE 750 MG: 10 INJECTION, POWDER, LYOPHILIZED, FOR SOLUTION INTRAVENOUS at 02:46

## 2019-10-12 RX ADMIN — MIDAZOLAM HYDROCHLORIDE 1 MG: 1 INJECTION INTRAMUSCULAR; INTRAVENOUS at 16:04

## 2019-10-12 RX ADMIN — PROPOFOL 30 MG: 10 INJECTION, EMULSION INTRAVENOUS at 16:45

## 2019-10-12 RX ADMIN — DOCUSATE SODIUM 100 MG: 100 CAPSULE, LIQUID FILLED ORAL at 20:06

## 2019-10-12 RX ADMIN — DIPHENHYDRAMINE HYDROCHLORIDE 25 MG: 50 INJECTION INTRAMUSCULAR; INTRAVENOUS at 22:19

## 2019-10-12 RX ADMIN — BUPRENORPHINE AND NALOXONE 1 TABLET: 8; 2 TABLET SUBLINGUAL at 10:07

## 2019-10-12 RX ADMIN — ACETAMINOPHEN 650 MG: 325 TABLET, FILM COATED ORAL at 20:06

## 2019-10-12 RX ADMIN — PIPERACILLIN AND TAZOBACTAM 3.38 G: 3; .375 INJECTION, POWDER, FOR SOLUTION INTRAVENOUS at 06:08

## 2019-10-12 RX ADMIN — Medication 10 ML: at 10:09

## 2019-10-12 RX ADMIN — MIDAZOLAM 1 MG: 1 INJECTION INTRAMUSCULAR; INTRAVENOUS at 16:04

## 2019-10-12 RX ADMIN — SODIUM CHLORIDE, POTASSIUM CHLORIDE, SODIUM LACTATE AND CALCIUM CHLORIDE: 600; 310; 30; 20 INJECTION, SOLUTION INTRAVENOUS at 16:31

## 2019-10-12 RX ADMIN — BUPRENORPHINE AND NALOXONE 1 TABLET: 8; 2 TABLET SUBLINGUAL at 20:06

## 2019-10-12 RX ADMIN — PROPOFOL 70 MG: 10 INJECTION, EMULSION INTRAVENOUS at 16:38

## 2019-10-12 RX ADMIN — Medication 10 ML: at 20:08

## 2019-10-12 RX ADMIN — MIDAZOLAM 2 MG: 1 INJECTION INTRAMUSCULAR; INTRAVENOUS at 16:46

## 2019-10-12 RX ADMIN — PROPOFOL 75 MCG/KG/MIN: 10 INJECTION, EMULSION INTRAVENOUS at 16:38

## 2019-10-12 ASSESSMENT — PAIN DESCRIPTION - LOCATION
LOCATION: FINGER (COMMENT WHICH ONE)

## 2019-10-12 ASSESSMENT — PULMONARY FUNCTION TESTS
PIF_VALUE: 0
PIF_VALUE: 0
PIF_VALUE: 1
PIF_VALUE: 0
PIF_VALUE: 1
PIF_VALUE: 1
PIF_VALUE: 0
PIF_VALUE: 0
PIF_VALUE: 1
PIF_VALUE: 0
PIF_VALUE: 0
PIF_VALUE: 1
PIF_VALUE: 0
PIF_VALUE: 0
PIF_VALUE: 3
PIF_VALUE: 1
PIF_VALUE: 0
PIF_VALUE: 0
PIF_VALUE: 1
PIF_VALUE: 0
PIF_VALUE: 0
PIF_VALUE: 1
PIF_VALUE: 0

## 2019-10-12 ASSESSMENT — PAIN DESCRIPTION - DESCRIPTORS
DESCRIPTORS: THROBBING
DESCRIPTORS: ACHING;CONSTANT;DISCOMFORT
DESCRIPTORS: ACHING;CONSTANT;DISCOMFORT

## 2019-10-12 ASSESSMENT — PAIN - FUNCTIONAL ASSESSMENT
PAIN_FUNCTIONAL_ASSESSMENT: PREVENTS OR INTERFERES SOME ACTIVE ACTIVITIES AND ADLS
PAIN_FUNCTIONAL_ASSESSMENT: PREVENTS OR INTERFERES SOME ACTIVE ACTIVITIES AND ADLS

## 2019-10-12 ASSESSMENT — PAIN SCALES - GENERAL
PAINLEVEL_OUTOF10: 10
PAINLEVEL_OUTOF10: 7
PAINLEVEL_OUTOF10: 10
PAINLEVEL_OUTOF10: 0
PAINLEVEL_OUTOF10: 0
PAINLEVEL_OUTOF10: 10
PAINLEVEL_OUTOF10: 8
PAINLEVEL_OUTOF10: 6

## 2019-10-12 ASSESSMENT — PAIN DESCRIPTION - FREQUENCY
FREQUENCY: CONTINUOUS

## 2019-10-12 ASSESSMENT — PAIN DESCRIPTION - PROGRESSION
CLINICAL_PROGRESSION: NOT CHANGED

## 2019-10-12 ASSESSMENT — PAIN DESCRIPTION - PAIN TYPE
TYPE: ACUTE PAIN
TYPE: ACUTE PAIN;SURGICAL PAIN
TYPE: ACUTE PAIN

## 2019-10-12 ASSESSMENT — PAIN DESCRIPTION - ONSET
ONSET: AWAKENED FROM SLEEP
ONSET: ON-GOING
ONSET: ON-GOING

## 2019-10-12 ASSESSMENT — PAIN DESCRIPTION - ORIENTATION
ORIENTATION: RIGHT
ORIENTATION: RIGHT
ORIENTATION: RIGHT;INNER

## 2019-10-12 ASSESSMENT — COPD QUESTIONNAIRES: CAT_SEVERITY: SEVERE

## 2019-10-12 ASSESSMENT — LIFESTYLE VARIABLES: SMOKING_STATUS: 1

## 2019-10-13 LAB
ANION GAP SERPL CALCULATED.3IONS-SCNC: 13 MMOL/L (ref 7–16)
BUN BLDV-MCNC: 18 MG/DL (ref 8–23)
CALCIUM SERPL-MCNC: 9.4 MG/DL (ref 8.6–10.2)
CHLORIDE BLD-SCNC: 101 MMOL/L (ref 98–107)
CO2: 24 MMOL/L (ref 22–29)
CREAT SERPL-MCNC: 0.7 MG/DL (ref 0.5–1)
GFR AFRICAN AMERICAN: >60
GFR NON-AFRICAN AMERICAN: >60 ML/MIN/1.73
GLUCOSE BLD-MCNC: 96 MG/DL (ref 74–99)
GRAM STAIN ORDERABLE: NORMAL
GRAM STAIN ORDERABLE: NORMAL
HCT VFR BLD CALC: 34.4 % (ref 34–48)
HEMOGLOBIN: 10.7 G/DL (ref 11.5–15.5)
MCH RBC QN AUTO: 33.4 PG (ref 26–35)
MCHC RBC AUTO-ENTMCNC: 31.1 % (ref 32–34.5)
MCV RBC AUTO: 107.5 FL (ref 80–99.9)
PDW BLD-RTO: 13.2 FL (ref 11.5–15)
PLATELET # BLD: 391 E9/L (ref 130–450)
PMV BLD AUTO: 9.5 FL (ref 7–12)
POTASSIUM SERPL-SCNC: 4.1 MMOL/L (ref 3.5–5)
RBC # BLD: 3.2 E12/L (ref 3.5–5.5)
SODIUM BLD-SCNC: 138 MMOL/L (ref 132–146)
VANCOMYCIN TROUGH: 7.7 MCG/ML (ref 5–16)
WBC # BLD: 8.1 E9/L (ref 4.5–11.5)

## 2019-10-13 PROCEDURE — 99024 POSTOP FOLLOW-UP VISIT: CPT | Performed by: ORTHOPAEDIC SURGERY

## 2019-10-13 PROCEDURE — 6360000002 HC RX W HCPCS: Performed by: STUDENT IN AN ORGANIZED HEALTH CARE EDUCATION/TRAINING PROGRAM

## 2019-10-13 PROCEDURE — 80048 BASIC METABOLIC PNL TOTAL CA: CPT

## 2019-10-13 PROCEDURE — 2580000003 HC RX 258: Performed by: STUDENT IN AN ORGANIZED HEALTH CARE EDUCATION/TRAINING PROGRAM

## 2019-10-13 PROCEDURE — 97165 OT EVAL LOW COMPLEX 30 MIN: CPT

## 2019-10-13 PROCEDURE — 36415 COLL VENOUS BLD VENIPUNCTURE: CPT

## 2019-10-13 PROCEDURE — 85027 COMPLETE CBC AUTOMATED: CPT

## 2019-10-13 PROCEDURE — 6360000002 HC RX W HCPCS: Performed by: INTERNAL MEDICINE

## 2019-10-13 PROCEDURE — 6370000000 HC RX 637 (ALT 250 FOR IP): Performed by: STUDENT IN AN ORGANIZED HEALTH CARE EDUCATION/TRAINING PROGRAM

## 2019-10-13 PROCEDURE — 97162 PT EVAL MOD COMPLEX 30 MIN: CPT | Performed by: PHYSICAL THERAPIST

## 2019-10-13 PROCEDURE — 1200000000 HC SEMI PRIVATE

## 2019-10-13 PROCEDURE — 80202 ASSAY OF VANCOMYCIN: CPT

## 2019-10-13 PROCEDURE — 97530 THERAPEUTIC ACTIVITIES: CPT | Performed by: PHYSICAL THERAPIST

## 2019-10-13 PROCEDURE — 97530 THERAPEUTIC ACTIVITIES: CPT

## 2019-10-13 PROCEDURE — 2580000003 HC RX 258: Performed by: INTERNAL MEDICINE

## 2019-10-13 RX ADMIN — DIPHENHYDRAMINE HYDROCHLORIDE 25 MG: 50 INJECTION INTRAMUSCULAR; INTRAVENOUS at 20:08

## 2019-10-13 RX ADMIN — BUPRENORPHINE AND NALOXONE 1 TABLET: 8; 2 TABLET SUBLINGUAL at 09:18

## 2019-10-13 RX ADMIN — VANCOMYCIN HYDROCHLORIDE 750 MG: 10 INJECTION, POWDER, LYOPHILIZED, FOR SOLUTION INTRAVENOUS at 11:40

## 2019-10-13 RX ADMIN — DIPHENHYDRAMINE HYDROCHLORIDE 25 MG: 50 INJECTION INTRAMUSCULAR; INTRAVENOUS at 04:35

## 2019-10-13 RX ADMIN — DOCUSATE SODIUM 100 MG: 100 CAPSULE, LIQUID FILLED ORAL at 09:19

## 2019-10-13 RX ADMIN — Medication 10 ML: at 09:19

## 2019-10-13 RX ADMIN — DIPHENHYDRAMINE HYDROCHLORIDE 25 MG: 50 INJECTION INTRAMUSCULAR; INTRAVENOUS at 11:17

## 2019-10-13 RX ADMIN — VANCOMYCIN HYDROCHLORIDE 750 MG: 10 INJECTION, POWDER, LYOPHILIZED, FOR SOLUTION INTRAVENOUS at 01:49

## 2019-10-13 RX ADMIN — WATER 2 G: 1 INJECTION INTRAMUSCULAR; INTRAVENOUS; SUBCUTANEOUS at 09:19

## 2019-10-13 RX ADMIN — ACETAMINOPHEN 650 MG: 325 TABLET, FILM COATED ORAL at 11:17

## 2019-10-13 RX ADMIN — DOCUSATE SODIUM 100 MG: 100 CAPSULE, LIQUID FILLED ORAL at 20:08

## 2019-10-13 RX ADMIN — Medication 10 ML: at 20:09

## 2019-10-13 RX ADMIN — BUPRENORPHINE AND NALOXONE 1 TABLET: 8; 2 TABLET SUBLINGUAL at 20:08

## 2019-10-13 RX ADMIN — ACETAMINOPHEN 650 MG: 325 TABLET, FILM COATED ORAL at 02:03

## 2019-10-13 RX ADMIN — VANCOMYCIN HYDROCHLORIDE 750 MG: 10 INJECTION, POWDER, LYOPHILIZED, FOR SOLUTION INTRAVENOUS at 20:09

## 2019-10-13 ASSESSMENT — PAIN SCALES - GENERAL
PAINLEVEL_OUTOF10: 10
PAINLEVEL_OUTOF10: 8
PAINLEVEL_OUTOF10: 7
PAINLEVEL_OUTOF10: 5
PAINLEVEL_OUTOF10: 10
PAINLEVEL_OUTOF10: 5
PAINLEVEL_OUTOF10: 6

## 2019-10-13 ASSESSMENT — PAIN DESCRIPTION - ONSET
ONSET: ON-GOING
ONSET: ON-GOING

## 2019-10-13 ASSESSMENT — PAIN DESCRIPTION - PAIN TYPE
TYPE: ACUTE PAIN;SURGICAL PAIN
TYPE: ACUTE PAIN;SURGICAL PAIN

## 2019-10-13 ASSESSMENT — PAIN DESCRIPTION - FREQUENCY
FREQUENCY: CONTINUOUS
FREQUENCY: CONTINUOUS

## 2019-10-13 ASSESSMENT — PAIN DESCRIPTION - PROGRESSION
CLINICAL_PROGRESSION: NOT CHANGED
CLINICAL_PROGRESSION: NOT CHANGED

## 2019-10-13 ASSESSMENT — PAIN DESCRIPTION - DESCRIPTORS
DESCRIPTORS: ACHING;CONSTANT;DISCOMFORT
DESCRIPTORS: ACHING;CONSTANT;DISCOMFORT

## 2019-10-13 ASSESSMENT — PAIN DESCRIPTION - LOCATION
LOCATION: FINGER (COMMENT WHICH ONE)
LOCATION: HAND

## 2019-10-13 ASSESSMENT — PAIN DESCRIPTION - ORIENTATION
ORIENTATION: RIGHT
ORIENTATION: RIGHT

## 2019-10-14 LAB
ANION GAP SERPL CALCULATED.3IONS-SCNC: 10 MMOL/L (ref 7–16)
BUN BLDV-MCNC: 15 MG/DL (ref 8–23)
CALCIUM SERPL-MCNC: 9.6 MG/DL (ref 8.6–10.2)
CHLORIDE BLD-SCNC: 104 MMOL/L (ref 98–107)
CO2: 25 MMOL/L (ref 22–29)
CREAT SERPL-MCNC: 0.5 MG/DL (ref 0.5–1)
GFR AFRICAN AMERICAN: >60
GFR NON-AFRICAN AMERICAN: >60 ML/MIN/1.73
GLUCOSE BLD-MCNC: 127 MG/DL (ref 74–99)
HCT VFR BLD CALC: 33.4 % (ref 34–48)
HEMOGLOBIN: 10.7 G/DL (ref 11.5–15.5)
INR BLD: 1
MCH RBC QN AUTO: 33.5 PG (ref 26–35)
MCHC RBC AUTO-ENTMCNC: 32 % (ref 32–34.5)
MCV RBC AUTO: 104.7 FL (ref 80–99.9)
PDW BLD-RTO: 13 FL (ref 11.5–15)
PLATELET # BLD: 368 E9/L (ref 130–450)
PMV BLD AUTO: 9.2 FL (ref 7–12)
POTASSIUM SERPL-SCNC: 4.3 MMOL/L (ref 3.5–5)
PROTHROMBIN TIME: 11.7 SEC (ref 9.3–12.4)
RBC # BLD: 3.19 E12/L (ref 3.5–5.5)
SODIUM BLD-SCNC: 139 MMOL/L (ref 132–146)
VANCOMYCIN TROUGH: 16.1 MCG/ML (ref 5–16)
WBC # BLD: 8.4 E9/L (ref 4.5–11.5)
WOUND/ABSCESS: NORMAL
WOUND/ABSCESS: NORMAL

## 2019-10-14 PROCEDURE — 1200000000 HC SEMI PRIVATE

## 2019-10-14 PROCEDURE — 6370000000 HC RX 637 (ALT 250 FOR IP): Performed by: STUDENT IN AN ORGANIZED HEALTH CARE EDUCATION/TRAINING PROGRAM

## 2019-10-14 PROCEDURE — 85610 PROTHROMBIN TIME: CPT

## 2019-10-14 PROCEDURE — 6360000002 HC RX W HCPCS: Performed by: STUDENT IN AN ORGANIZED HEALTH CARE EDUCATION/TRAINING PROGRAM

## 2019-10-14 PROCEDURE — 85027 COMPLETE CBC AUTOMATED: CPT

## 2019-10-14 PROCEDURE — 36415 COLL VENOUS BLD VENIPUNCTURE: CPT

## 2019-10-14 PROCEDURE — 80048 BASIC METABOLIC PNL TOTAL CA: CPT

## 2019-10-14 PROCEDURE — 6360000002 HC RX W HCPCS: Performed by: INTERNAL MEDICINE

## 2019-10-14 PROCEDURE — 2580000003 HC RX 258: Performed by: INTERNAL MEDICINE

## 2019-10-14 PROCEDURE — 2580000003 HC RX 258: Performed by: STUDENT IN AN ORGANIZED HEALTH CARE EDUCATION/TRAINING PROGRAM

## 2019-10-14 PROCEDURE — 80202 ASSAY OF VANCOMYCIN: CPT

## 2019-10-14 PROCEDURE — 6370000000 HC RX 637 (ALT 250 FOR IP): Performed by: SPECIALIST

## 2019-10-14 RX ORDER — CIPROFLOXACIN 500 MG/1
500 TABLET, FILM COATED ORAL 2 TIMES DAILY
Qty: 28 TABLET | Refills: 0 | Status: SHIPPED | OUTPATIENT
Start: 2019-10-14 | End: 2019-10-28

## 2019-10-14 RX ORDER — LINEZOLID 600 MG/1
600 TABLET, FILM COATED ORAL EVERY 12 HOURS SCHEDULED
Status: DISCONTINUED | OUTPATIENT
Start: 2019-10-14 | End: 2019-10-15 | Stop reason: HOSPADM

## 2019-10-14 RX ORDER — LINEZOLID 600 MG/1
600 TABLET, FILM COATED ORAL EVERY 12 HOURS SCHEDULED
Qty: 28 TABLET | Refills: 0 | Status: SHIPPED | OUTPATIENT
Start: 2019-10-14 | End: 2019-10-28

## 2019-10-14 RX ADMIN — LINEZOLID 600 MG: 600 TABLET, FILM COATED ORAL at 21:03

## 2019-10-14 RX ADMIN — DIPHENHYDRAMINE HYDROCHLORIDE 25 MG: 50 INJECTION INTRAMUSCULAR; INTRAVENOUS at 11:11

## 2019-10-14 RX ADMIN — Medication 10 ML: at 20:59

## 2019-10-14 RX ADMIN — VANCOMYCIN HYDROCHLORIDE 750 MG: 10 INJECTION, POWDER, LYOPHILIZED, FOR SOLUTION INTRAVENOUS at 13:16

## 2019-10-14 RX ADMIN — DOCUSATE SODIUM 100 MG: 100 CAPSULE, LIQUID FILLED ORAL at 21:04

## 2019-10-14 RX ADMIN — WATER 2 G: 1 INJECTION INTRAMUSCULAR; INTRAVENOUS; SUBCUTANEOUS at 09:26

## 2019-10-14 RX ADMIN — VANCOMYCIN HYDROCHLORIDE 750 MG: 10 INJECTION, POWDER, LYOPHILIZED, FOR SOLUTION INTRAVENOUS at 04:25

## 2019-10-14 RX ADMIN — DIPHENHYDRAMINE HYDROCHLORIDE 25 MG: 50 INJECTION INTRAMUSCULAR; INTRAVENOUS at 04:25

## 2019-10-14 RX ADMIN — BUPRENORPHINE AND NALOXONE 1 TABLET: 8; 2 TABLET SUBLINGUAL at 21:04

## 2019-10-14 RX ADMIN — DIPHENHYDRAMINE HYDROCHLORIDE 25 MG: 50 INJECTION INTRAMUSCULAR; INTRAVENOUS at 17:25

## 2019-10-14 RX ADMIN — DOCUSATE SODIUM 100 MG: 100 CAPSULE, LIQUID FILLED ORAL at 09:25

## 2019-10-14 RX ADMIN — BUPRENORPHINE AND NALOXONE 1 TABLET: 8; 2 TABLET SUBLINGUAL at 09:25

## 2019-10-14 RX ADMIN — ACETAMINOPHEN 650 MG: 325 TABLET, FILM COATED ORAL at 11:10

## 2019-10-14 RX ADMIN — ACETAMINOPHEN 650 MG: 325 TABLET, FILM COATED ORAL at 20:56

## 2019-10-14 RX ADMIN — ACETAMINOPHEN 650 MG: 325 TABLET, FILM COATED ORAL at 16:22

## 2019-10-14 RX ADMIN — ACETAMINOPHEN 650 MG: 325 TABLET, FILM COATED ORAL at 04:25

## 2019-10-14 ASSESSMENT — PAIN DESCRIPTION - DESCRIPTORS
DESCRIPTORS: SHARP
DESCRIPTORS: ACHING

## 2019-10-14 ASSESSMENT — PAIN DESCRIPTION - PAIN TYPE
TYPE: ACUTE PAIN
TYPE: ACUTE PAIN

## 2019-10-14 ASSESSMENT — PAIN DESCRIPTION - LOCATION
LOCATION: HAND
LOCATION: HAND

## 2019-10-14 ASSESSMENT — PAIN SCALES - GENERAL
PAINLEVEL_OUTOF10: 8
PAINLEVEL_OUTOF10: 10
PAINLEVEL_OUTOF10: 10
PAINLEVEL_OUTOF10: 2
PAINLEVEL_OUTOF10: 10
PAINLEVEL_OUTOF10: 7
PAINLEVEL_OUTOF10: 10

## 2019-10-14 ASSESSMENT — PAIN DESCRIPTION - PROGRESSION
CLINICAL_PROGRESSION: NOT CHANGED
CLINICAL_PROGRESSION: NOT CHANGED

## 2019-10-14 ASSESSMENT — PAIN DESCRIPTION - FREQUENCY
FREQUENCY: CONTINUOUS
FREQUENCY: CONTINUOUS

## 2019-10-14 ASSESSMENT — PAIN DESCRIPTION - ORIENTATION
ORIENTATION: RIGHT
ORIENTATION: RIGHT

## 2019-10-14 ASSESSMENT — PAIN - FUNCTIONAL ASSESSMENT
PAIN_FUNCTIONAL_ASSESSMENT: PREVENTS OR INTERFERES SOME ACTIVE ACTIVITIES AND ADLS
PAIN_FUNCTIONAL_ASSESSMENT: ACTIVITIES ARE NOT PREVENTED

## 2019-10-14 ASSESSMENT — PAIN DESCRIPTION - ONSET
ONSET: AWAKENED FROM SLEEP
ONSET: GRADUAL

## 2019-10-15 VITALS
DIASTOLIC BLOOD PRESSURE: 44 MMHG | OXYGEN SATURATION: 93 % | SYSTOLIC BLOOD PRESSURE: 105 MMHG | HEART RATE: 82 BPM | RESPIRATION RATE: 20 BRPM | WEIGHT: 110.19 LBS | BODY MASS INDEX: 17.71 KG/M2 | HEIGHT: 66 IN | TEMPERATURE: 98.5 F

## 2019-10-15 PROBLEM — R76.8 RED BLOOD CELL ANTIBODY POSITIVE: Chronic | Status: ACTIVE | Noted: 2019-10-15

## 2019-10-15 LAB
ANION GAP SERPL CALCULATED.3IONS-SCNC: 9 MMOL/L (ref 7–16)
BLOOD BANK DISPENSE STATUS: NORMAL
BLOOD BANK PRODUCT CODE: NORMAL
BPU ID: NORMAL
BUN BLDV-MCNC: 12 MG/DL (ref 8–23)
CALCIUM SERPL-MCNC: 9.9 MG/DL (ref 8.6–10.2)
CHLORIDE BLD-SCNC: 101 MMOL/L (ref 98–107)
CO2: 26 MMOL/L (ref 22–29)
CREAT SERPL-MCNC: 0.5 MG/DL (ref 0.5–1)
CULTURE SURGICAL: NORMAL
CULTURE SURGICAL: NORMAL
DESCRIPTION BLOOD BANK: NORMAL
GFR AFRICAN AMERICAN: >60
GFR NON-AFRICAN AMERICAN: >60 ML/MIN/1.73
GLUCOSE BLD-MCNC: 104 MG/DL (ref 74–99)
HCT VFR BLD CALC: 34.2 % (ref 34–48)
HEMOGLOBIN: 11 G/DL (ref 11.5–15.5)
MCH RBC QN AUTO: 33.6 PG (ref 26–35)
MCHC RBC AUTO-ENTMCNC: 32.2 % (ref 32–34.5)
MCV RBC AUTO: 104.6 FL (ref 80–99.9)
PDW BLD-RTO: 13.1 FL (ref 11.5–15)
PLATELET # BLD: 350 E9/L (ref 130–450)
PMV BLD AUTO: 9 FL (ref 7–12)
POTASSIUM SERPL-SCNC: 4 MMOL/L (ref 3.5–5)
RBC # BLD: 3.27 E12/L (ref 3.5–5.5)
SODIUM BLD-SCNC: 136 MMOL/L (ref 132–146)
WBC # BLD: 9.8 E9/L (ref 4.5–11.5)

## 2019-10-15 PROCEDURE — 2580000003 HC RX 258: Performed by: STUDENT IN AN ORGANIZED HEALTH CARE EDUCATION/TRAINING PROGRAM

## 2019-10-15 PROCEDURE — 80048 BASIC METABOLIC PNL TOTAL CA: CPT

## 2019-10-15 PROCEDURE — 6360000002 HC RX W HCPCS: Performed by: STUDENT IN AN ORGANIZED HEALTH CARE EDUCATION/TRAINING PROGRAM

## 2019-10-15 PROCEDURE — 6370000000 HC RX 637 (ALT 250 FOR IP): Performed by: STUDENT IN AN ORGANIZED HEALTH CARE EDUCATION/TRAINING PROGRAM

## 2019-10-15 PROCEDURE — 36415 COLL VENOUS BLD VENIPUNCTURE: CPT

## 2019-10-15 PROCEDURE — 6370000000 HC RX 637 (ALT 250 FOR IP): Performed by: SPECIALIST

## 2019-10-15 PROCEDURE — 85027 COMPLETE CBC AUTOMATED: CPT

## 2019-10-15 RX ADMIN — LINEZOLID 600 MG: 600 TABLET, FILM COATED ORAL at 08:31

## 2019-10-15 RX ADMIN — BUPRENORPHINE AND NALOXONE 1 TABLET: 8; 2 TABLET SUBLINGUAL at 08:31

## 2019-10-15 RX ADMIN — ACETAMINOPHEN 650 MG: 325 TABLET, FILM COATED ORAL at 16:00

## 2019-10-15 RX ADMIN — DIPHENHYDRAMINE HYDROCHLORIDE 25 MG: 50 INJECTION INTRAMUSCULAR; INTRAVENOUS at 10:07

## 2019-10-15 RX ADMIN — ACETAMINOPHEN 650 MG: 325 TABLET, FILM COATED ORAL at 10:09

## 2019-10-15 RX ADMIN — ACETAMINOPHEN 650 MG: 325 TABLET, FILM COATED ORAL at 03:30

## 2019-10-15 RX ADMIN — WATER 2 G: 1 INJECTION INTRAMUSCULAR; INTRAVENOUS; SUBCUTANEOUS at 08:32

## 2019-10-15 RX ADMIN — Medication 10 ML: at 08:41

## 2019-10-15 RX ADMIN — DIPHENHYDRAMINE HYDROCHLORIDE 25 MG: 50 INJECTION INTRAMUSCULAR; INTRAVENOUS at 03:30

## 2019-10-15 RX ADMIN — DOCUSATE SODIUM 100 MG: 100 CAPSULE, LIQUID FILLED ORAL at 08:31

## 2019-10-15 ASSESSMENT — PAIN SCALES - GENERAL
PAINLEVEL_OUTOF10: 3
PAINLEVEL_OUTOF10: 10
PAINLEVEL_OUTOF10: 10
PAINLEVEL_OUTOF10: 0
PAINLEVEL_OUTOF10: 10

## 2019-10-15 ASSESSMENT — PAIN DESCRIPTION - FREQUENCY
FREQUENCY: CONTINUOUS
FREQUENCY: CONTINUOUS

## 2019-10-15 ASSESSMENT — PAIN DESCRIPTION - PROGRESSION
CLINICAL_PROGRESSION: NOT CHANGED
CLINICAL_PROGRESSION: NOT CHANGED

## 2019-10-15 ASSESSMENT — PAIN DESCRIPTION - ORIENTATION
ORIENTATION: RIGHT
ORIENTATION: RIGHT

## 2019-10-15 ASSESSMENT — PAIN DESCRIPTION - LOCATION
LOCATION: HAND
LOCATION: HAND

## 2019-10-15 ASSESSMENT — PAIN DESCRIPTION - PAIN TYPE
TYPE: ACUTE PAIN
TYPE: ACUTE PAIN;SURGICAL PAIN

## 2019-10-15 ASSESSMENT — PAIN DESCRIPTION - ONSET
ONSET: ON-GOING
ONSET: AWAKENED FROM SLEEP

## 2019-10-15 ASSESSMENT — PAIN - FUNCTIONAL ASSESSMENT: PAIN_FUNCTIONAL_ASSESSMENT: PREVENTS OR INTERFERES SOME ACTIVE ACTIVITIES AND ADLS

## 2019-10-15 ASSESSMENT — PAIN DESCRIPTION - DESCRIPTORS
DESCRIPTORS: ACHING;DISCOMFORT;SORE;TENDER
DESCRIPTORS: SHARP

## 2019-10-16 LAB
ANAEROBIC CULTURE: NORMAL
BLOOD CULTURE, ROUTINE: NORMAL
CULTURE, BLOOD 2: NORMAL

## 2019-10-17 ENCOUNTER — OFFICE VISIT (OUTPATIENT)
Dept: ORTHOPEDIC SURGERY | Age: 69
End: 2019-10-17

## 2019-10-17 VITALS — BODY MASS INDEX: 18.33 KG/M2 | HEIGHT: 65 IN | WEIGHT: 110 LBS

## 2019-10-17 DIAGNOSIS — M65.9 TENOSYNOVITIS OF FINGER: Primary | ICD-10-CM

## 2019-10-17 LAB
ANAEROBIC CULTURE: NORMAL
ANAEROBIC CULTURE: NORMAL
EKG ATRIAL RATE: 85 BPM
EKG P AXIS: 72 DEGREES
EKG P-R INTERVAL: 166 MS
EKG Q-T INTERVAL: 390 MS
EKG QRS DURATION: 78 MS
EKG QTC CALCULATION (BAZETT): 464 MS
EKG R AXIS: 67 DEGREES
EKG T AXIS: 79 DEGREES
EKG VENTRICULAR RATE: 85 BPM

## 2019-10-17 PROCEDURE — 99024 POSTOP FOLLOW-UP VISIT: CPT | Performed by: ORTHOPAEDIC SURGERY

## 2019-10-23 ENCOUNTER — OFFICE VISIT (OUTPATIENT)
Dept: ORTHOPEDIC SURGERY | Age: 69
End: 2019-10-23

## 2019-10-23 VITALS — HEIGHT: 65 IN | BODY MASS INDEX: 18.33 KG/M2 | WEIGHT: 110 LBS

## 2019-10-23 DIAGNOSIS — M65.9 TENOSYNOVITIS OF FINGER: Primary | ICD-10-CM

## 2019-10-23 PROCEDURE — 99024 POSTOP FOLLOW-UP VISIT: CPT | Performed by: ORTHOPAEDIC SURGERY

## 2019-10-23 RX ORDER — DIPHENHYDRAMINE HYDROCHLORIDE 50 MG/ML
25 INJECTION INTRAMUSCULAR; INTRAVENOUS
COMMUNITY
Start: 2019-10-12 | End: 2022-02-02 | Stop reason: ALTCHOICE

## 2019-10-23 RX ORDER — ACETAMINOPHEN 325 MG/1
650 TABLET ORAL
COMMUNITY
Start: 2019-10-11 | End: 2022-02-02 | Stop reason: ALTCHOICE

## 2019-10-29 ENCOUNTER — OFFICE VISIT (OUTPATIENT)
Dept: ORTHOPEDIC SURGERY | Age: 69
End: 2019-10-29

## 2019-10-29 VITALS — BODY MASS INDEX: 17.68 KG/M2 | WEIGHT: 110 LBS | HEIGHT: 66 IN

## 2019-10-29 DIAGNOSIS — M65.9 TENOSYNOVITIS OF FINGER: Primary | ICD-10-CM

## 2019-10-29 PROCEDURE — 99024 POSTOP FOLLOW-UP VISIT: CPT | Performed by: ORTHOPAEDIC SURGERY

## 2019-11-08 ENCOUNTER — OFFICE VISIT (OUTPATIENT)
Dept: ORTHOPEDIC SURGERY | Age: 69
End: 2019-11-08

## 2019-11-08 VITALS — TEMPERATURE: 98 F | HEIGHT: 66 IN | BODY MASS INDEX: 17.84 KG/M2 | WEIGHT: 111 LBS

## 2019-11-08 DIAGNOSIS — M25.641 STIFFNESS OF FINGER JOINT OF RIGHT HAND: ICD-10-CM

## 2019-11-08 DIAGNOSIS — M65.9 TENOSYNOVITIS OF FINGER: Primary | ICD-10-CM

## 2019-11-08 PROCEDURE — 99024 POSTOP FOLLOW-UP VISIT: CPT | Performed by: ORTHOPAEDIC SURGERY

## 2022-02-02 ENCOUNTER — APPOINTMENT (OUTPATIENT)
Dept: CT IMAGING | Age: 72
DRG: 137 | End: 2022-02-02
Payer: COMMERCIAL

## 2022-02-02 ENCOUNTER — APPOINTMENT (OUTPATIENT)
Dept: GENERAL RADIOLOGY | Age: 72
DRG: 137 | End: 2022-02-02
Payer: COMMERCIAL

## 2022-02-02 ENCOUNTER — HOSPITAL ENCOUNTER (INPATIENT)
Age: 72
LOS: 13 days | Discharge: HOME | DRG: 137 | End: 2022-02-15
Attending: EMERGENCY MEDICINE | Admitting: STUDENT IN AN ORGANIZED HEALTH CARE EDUCATION/TRAINING PROGRAM
Payer: COMMERCIAL

## 2022-02-02 DIAGNOSIS — U07.1 COVID-19: Primary | ICD-10-CM

## 2022-02-02 DIAGNOSIS — J96.01 ACUTE RESPIRATORY FAILURE WITH HYPOXIA (HCC): ICD-10-CM

## 2022-02-02 LAB
ALBUMIN SERPL-MCNC: 3.6 G/DL (ref 3.5–5.2)
ALP BLD-CCNC: 74 U/L (ref 35–104)
ALT SERPL-CCNC: 13 U/L (ref 0–32)
ANION GAP SERPL CALCULATED.3IONS-SCNC: 12 MMOL/L (ref 7–16)
AST SERPL-CCNC: 19 U/L (ref 0–31)
BASOPHILS ABSOLUTE: 0.02 E9/L (ref 0–0.2)
BASOPHILS RELATIVE PERCENT: 0.2 % (ref 0–2)
BILIRUB SERPL-MCNC: 0.3 MG/DL (ref 0–1.2)
BUN BLDV-MCNC: 11 MG/DL (ref 6–23)
CALCIUM SERPL-MCNC: 9.6 MG/DL (ref 8.6–10.2)
CHLORIDE BLD-SCNC: 102 MMOL/L (ref 98–107)
CO2: 22 MMOL/L (ref 22–29)
CREAT SERPL-MCNC: 0.4 MG/DL (ref 0.5–1)
D DIMER: 675 NG/ML DDU
EOSINOPHILS ABSOLUTE: 0.01 E9/L (ref 0.05–0.5)
EOSINOPHILS RELATIVE PERCENT: 0.1 % (ref 0–6)
GFR AFRICAN AMERICAN: >60
GFR NON-AFRICAN AMERICAN: >60 ML/MIN/1.73
GLUCOSE BLD-MCNC: 89 MG/DL (ref 74–99)
HCT VFR BLD CALC: 40.3 % (ref 34–48)
HEMOGLOBIN: 12.9 G/DL (ref 11.5–15.5)
IMMATURE GRANULOCYTES #: 0.03 E9/L
IMMATURE GRANULOCYTES %: 0.3 % (ref 0–5)
LYMPHOCYTES ABSOLUTE: 0.92 E9/L (ref 1.5–4)
LYMPHOCYTES RELATIVE PERCENT: 8.7 % (ref 20–42)
MAGNESIUM: 1.8 MG/DL (ref 1.6–2.6)
MCH RBC QN AUTO: 30 PG (ref 26–35)
MCHC RBC AUTO-ENTMCNC: 32 % (ref 32–34.5)
MCV RBC AUTO: 93.7 FL (ref 80–99.9)
MONOCYTES ABSOLUTE: 0.9 E9/L (ref 0.1–0.95)
MONOCYTES RELATIVE PERCENT: 8.5 % (ref 2–12)
NEUTROPHILS ABSOLUTE: 8.67 E9/L (ref 1.8–7.3)
NEUTROPHILS RELATIVE PERCENT: 82.2 % (ref 43–80)
PDW BLD-RTO: 13.1 FL (ref 11.5–15)
PLATELET # BLD: 252 E9/L (ref 130–450)
PMV BLD AUTO: 10.8 FL (ref 7–12)
POTASSIUM REFLEX MAGNESIUM: 3.4 MMOL/L (ref 3.5–5)
PRO-BNP: 560 PG/ML (ref 0–125)
PROCALCITONIN: 0.04 NG/ML (ref 0–0.08)
RBC # BLD: 4.3 E12/L (ref 3.5–5.5)
SARS-COV-2, NAAT: DETECTED
SODIUM BLD-SCNC: 136 MMOL/L (ref 132–146)
TOTAL PROTEIN: 7 G/DL (ref 6.4–8.3)
TROPONIN, HIGH SENSITIVITY: <6 NG/L (ref 0–9)
WBC # BLD: 10.6 E9/L (ref 4.5–11.5)

## 2022-02-02 PROCEDURE — 2580000003 HC RX 258: Performed by: STUDENT IN AN ORGANIZED HEALTH CARE EDUCATION/TRAINING PROGRAM

## 2022-02-02 PROCEDURE — 6360000002 HC RX W HCPCS: Performed by: STUDENT IN AN ORGANIZED HEALTH CARE EDUCATION/TRAINING PROGRAM

## 2022-02-02 PROCEDURE — 6370000000 HC RX 637 (ALT 250 FOR IP): Performed by: STUDENT IN AN ORGANIZED HEALTH CARE EDUCATION/TRAINING PROGRAM

## 2022-02-02 PROCEDURE — 84484 ASSAY OF TROPONIN QUANT: CPT

## 2022-02-02 PROCEDURE — 36415 COLL VENOUS BLD VENIPUNCTURE: CPT

## 2022-02-02 PROCEDURE — 84145 PROCALCITONIN (PCT): CPT

## 2022-02-02 PROCEDURE — 6360000004 HC RX CONTRAST MEDICATION: Performed by: RADIOLOGY

## 2022-02-02 PROCEDURE — 80053 COMPREHEN METABOLIC PANEL: CPT

## 2022-02-02 PROCEDURE — 99285 EMERGENCY DEPT VISIT HI MDM: CPT

## 2022-02-02 PROCEDURE — 6360000002 HC RX W HCPCS: Performed by: EMERGENCY MEDICINE

## 2022-02-02 PROCEDURE — 2060000000 HC ICU INTERMEDIATE R&B

## 2022-02-02 PROCEDURE — 6370000000 HC RX 637 (ALT 250 FOR IP)

## 2022-02-02 PROCEDURE — 85378 FIBRIN DEGRADE SEMIQUANT: CPT

## 2022-02-02 PROCEDURE — 85025 COMPLETE CBC W/AUTO DIFF WBC: CPT

## 2022-02-02 PROCEDURE — 87449 NOS EACH ORGANISM AG IA: CPT

## 2022-02-02 PROCEDURE — 94640 AIRWAY INHALATION TREATMENT: CPT

## 2022-02-02 PROCEDURE — 99222 1ST HOSP IP/OBS MODERATE 55: CPT | Performed by: STUDENT IN AN ORGANIZED HEALTH CARE EDUCATION/TRAINING PROGRAM

## 2022-02-02 PROCEDURE — 87635 SARS-COV-2 COVID-19 AMP PRB: CPT

## 2022-02-02 PROCEDURE — 93005 ELECTROCARDIOGRAM TRACING: CPT | Performed by: EMERGENCY MEDICINE

## 2022-02-02 PROCEDURE — 6370000000 HC RX 637 (ALT 250 FOR IP): Performed by: EMERGENCY MEDICINE

## 2022-02-02 PROCEDURE — 71275 CT ANGIOGRAPHY CHEST: CPT

## 2022-02-02 PROCEDURE — 83735 ASSAY OF MAGNESIUM: CPT

## 2022-02-02 PROCEDURE — 2580000003 HC RX 258: Performed by: EMERGENCY MEDICINE

## 2022-02-02 PROCEDURE — 71045 X-RAY EXAM CHEST 1 VIEW: CPT

## 2022-02-02 PROCEDURE — 96374 THER/PROPH/DIAG INJ IV PUSH: CPT

## 2022-02-02 PROCEDURE — 83880 ASSAY OF NATRIURETIC PEPTIDE: CPT

## 2022-02-02 RX ORDER — DEXAMETHASONE SODIUM PHOSPHATE 10 MG/ML
6 INJECTION, SOLUTION INTRAMUSCULAR; INTRAVENOUS EVERY 24 HOURS
Status: DISCONTINUED | OUTPATIENT
Start: 2022-02-03 | End: 2022-02-02

## 2022-02-02 RX ORDER — MULTIVIT WITH MINERALS/LUTEIN
250 TABLET ORAL DAILY
Status: DISCONTINUED | OUTPATIENT
Start: 2022-02-02 | End: 2022-02-15 | Stop reason: HOSPADM

## 2022-02-02 RX ORDER — MIRTAZAPINE 15 MG/1
30 TABLET, FILM COATED ORAL NIGHTLY
Status: DISCONTINUED | OUTPATIENT
Start: 2022-02-02 | End: 2022-02-15 | Stop reason: HOSPADM

## 2022-02-02 RX ORDER — METHYLPREDNISOLONE SODIUM SUCCINATE 40 MG/ML
40 INJECTION, POWDER, LYOPHILIZED, FOR SOLUTION INTRAMUSCULAR; INTRAVENOUS DAILY
Status: DISCONTINUED | OUTPATIENT
Start: 2022-02-03 | End: 2022-02-14

## 2022-02-02 RX ORDER — ONDANSETRON 4 MG/1
4 TABLET, ORALLY DISINTEGRATING ORAL EVERY 8 HOURS PRN
Status: DISCONTINUED | OUTPATIENT
Start: 2022-02-02 | End: 2022-02-15 | Stop reason: HOSPADM

## 2022-02-02 RX ORDER — BUSPIRONE HYDROCHLORIDE 5 MG/1
5 TABLET ORAL 2 TIMES DAILY
COMMUNITY

## 2022-02-02 RX ORDER — DEXAMETHASONE SODIUM PHOSPHATE 10 MG/ML
6 INJECTION, SOLUTION INTRAMUSCULAR; INTRAVENOUS EVERY 24 HOURS
Status: DISCONTINUED | OUTPATIENT
Start: 2022-02-02 | End: 2022-02-02

## 2022-02-02 RX ORDER — ACETAMINOPHEN 325 MG/1
650 TABLET ORAL EVERY 6 HOURS PRN
Status: DISCONTINUED | OUTPATIENT
Start: 2022-02-02 | End: 2022-02-12

## 2022-02-02 RX ORDER — MIRTAZAPINE 30 MG/1
30 TABLET, FILM COATED ORAL NIGHTLY
COMMUNITY

## 2022-02-02 RX ORDER — ZINC SULFATE 50(220)MG
50 CAPSULE ORAL DAILY
Status: DISCONTINUED | OUTPATIENT
Start: 2022-02-02 | End: 2022-02-15 | Stop reason: HOSPADM

## 2022-02-02 RX ORDER — SODIUM CHLORIDE 0.9 % (FLUSH) 0.9 %
5-40 SYRINGE (ML) INJECTION PRN
Status: DISCONTINUED | OUTPATIENT
Start: 2022-02-02 | End: 2022-02-15 | Stop reason: HOSPADM

## 2022-02-02 RX ORDER — METHYLPREDNISOLONE SODIUM SUCCINATE 125 MG/2ML
125 INJECTION, POWDER, LYOPHILIZED, FOR SOLUTION INTRAMUSCULAR; INTRAVENOUS ONCE
Status: COMPLETED | OUTPATIENT
Start: 2022-02-02 | End: 2022-02-02

## 2022-02-02 RX ORDER — BUPRENORPHINE HYDROCHLORIDE AND NALOXONE HYDROCHLORIDE DIHYDRATE 8; 2 MG/1; MG/1
1 TABLET SUBLINGUAL 3 TIMES DAILY
Status: DISCONTINUED | OUTPATIENT
Start: 2022-02-02 | End: 2022-02-15 | Stop reason: HOSPADM

## 2022-02-02 RX ORDER — GUAIFENESIN 600 MG/1
1200 TABLET, EXTENDED RELEASE ORAL 2 TIMES DAILY
Status: DISCONTINUED | OUTPATIENT
Start: 2022-02-02 | End: 2022-02-15 | Stop reason: HOSPADM

## 2022-02-02 RX ORDER — LANOLIN ALCOHOL/MO/W.PET/CERES
50 CREAM (GRAM) TOPICAL DAILY
Status: DISCONTINUED | OUTPATIENT
Start: 2022-02-02 | End: 2022-02-15 | Stop reason: HOSPADM

## 2022-02-02 RX ORDER — BUSPIRONE HYDROCHLORIDE 5 MG/1
5 TABLET ORAL 2 TIMES DAILY
Status: DISCONTINUED | OUTPATIENT
Start: 2022-02-02 | End: 2022-02-13

## 2022-02-02 RX ORDER — ACETAMINOPHEN 650 MG/1
650 SUPPOSITORY RECTAL EVERY 6 HOURS PRN
Status: DISCONTINUED | OUTPATIENT
Start: 2022-02-02 | End: 2022-02-12

## 2022-02-02 RX ORDER — GUAIFENESIN 600 MG/1
1200 TABLET, EXTENDED RELEASE ORAL 2 TIMES DAILY
Status: ON HOLD | COMMUNITY
End: 2022-02-15 | Stop reason: HOSPADM

## 2022-02-02 RX ORDER — 0.9 % SODIUM CHLORIDE 0.9 %
1000 INTRAVENOUS SOLUTION INTRAVENOUS ONCE
Status: COMPLETED | OUTPATIENT
Start: 2022-02-02 | End: 2022-02-02

## 2022-02-02 RX ORDER — ACETYLCYSTEINE 100 MG/ML
4 SOLUTION ORAL; RESPIRATORY (INHALATION) EVERY 4 HOURS
Status: DISCONTINUED | OUTPATIENT
Start: 2022-02-02 | End: 2022-02-05

## 2022-02-02 RX ORDER — POLYETHYLENE GLYCOL 3350 17 G/17G
17 POWDER, FOR SOLUTION ORAL DAILY PRN
Status: DISCONTINUED | OUTPATIENT
Start: 2022-02-02 | End: 2022-02-15 | Stop reason: HOSPADM

## 2022-02-02 RX ORDER — IPRATROPIUM BROMIDE AND ALBUTEROL SULFATE 2.5; .5 MG/3ML; MG/3ML
1 SOLUTION RESPIRATORY (INHALATION)
Status: DISCONTINUED | OUTPATIENT
Start: 2022-02-02 | End: 2022-02-15 | Stop reason: HOSPADM

## 2022-02-02 RX ORDER — SODIUM CHLORIDE 0.9 % (FLUSH) 0.9 %
5-40 SYRINGE (ML) INJECTION EVERY 12 HOURS SCHEDULED
Status: DISCONTINUED | OUTPATIENT
Start: 2022-02-02 | End: 2022-02-15 | Stop reason: HOSPADM

## 2022-02-02 RX ORDER — VITAMIN B COMPLEX
2000 TABLET ORAL DAILY
Status: DISCONTINUED | OUTPATIENT
Start: 2022-02-02 | End: 2022-02-15 | Stop reason: HOSPADM

## 2022-02-02 RX ORDER — SODIUM CHLORIDE 9 MG/ML
25 INJECTION, SOLUTION INTRAVENOUS PRN
Status: DISCONTINUED | OUTPATIENT
Start: 2022-02-02 | End: 2022-02-15 | Stop reason: HOSPADM

## 2022-02-02 RX ORDER — ONDANSETRON 2 MG/ML
4 INJECTION INTRAMUSCULAR; INTRAVENOUS EVERY 6 HOURS PRN
Status: DISCONTINUED | OUTPATIENT
Start: 2022-02-02 | End: 2022-02-15 | Stop reason: HOSPADM

## 2022-02-02 RX ORDER — ACETAMINOPHEN 325 MG/1
650 TABLET ORAL ONCE
Status: COMPLETED | OUTPATIENT
Start: 2022-02-02 | End: 2022-02-02

## 2022-02-02 RX ORDER — IPRATROPIUM BROMIDE AND ALBUTEROL SULFATE 2.5; .5 MG/3ML; MG/3ML
3 SOLUTION RESPIRATORY (INHALATION) ONCE
Status: COMPLETED | OUTPATIENT
Start: 2022-02-02 | End: 2022-02-02

## 2022-02-02 RX ORDER — ACETAMINOPHEN 325 MG/1
TABLET ORAL
Status: COMPLETED
Start: 2022-02-02 | End: 2022-02-02

## 2022-02-02 RX ADMIN — Medication 10 ML: at 19:54

## 2022-02-02 RX ADMIN — ACETYLCYSTEINE 400 MG: 100 INHALANT RESPIRATORY (INHALATION) at 22:13

## 2022-02-02 RX ADMIN — ZINC SULFATE 220 MG (50 MG) CAPSULE 50 MG: CAPSULE at 17:57

## 2022-02-02 RX ADMIN — IPRATROPIUM BROMIDE AND ALBUTEROL SULFATE 1 AMPULE: .5; 2.5 SOLUTION RESPIRATORY (INHALATION) at 22:13

## 2022-02-02 RX ADMIN — ACETAMINOPHEN 650 MG: 325 TABLET ORAL at 19:53

## 2022-02-02 RX ADMIN — Medication 2000 UNITS: at 17:57

## 2022-02-02 RX ADMIN — Medication 250 MG: at 17:57

## 2022-02-02 RX ADMIN — METHYLPREDNISOLONE SODIUM SUCCINATE 125 MG: 125 INJECTION, POWDER, FOR SOLUTION INTRAMUSCULAR; INTRAVENOUS at 11:40

## 2022-02-02 RX ADMIN — PYRIDOXINE HCL TAB 50 MG 50 MG: 50 TAB at 17:57

## 2022-02-02 RX ADMIN — IPRATROPIUM BROMIDE AND ALBUTEROL SULFATE 3 ML: .5; 2.5 SOLUTION RESPIRATORY (INHALATION) at 11:51

## 2022-02-02 RX ADMIN — BUSPIRONE HYDROCHLORIDE 5 MG: 5 TABLET ORAL at 19:54

## 2022-02-02 RX ADMIN — ACETAMINOPHEN 650 MG: 325 TABLET ORAL at 13:14

## 2022-02-02 RX ADMIN — ENOXAPARIN SODIUM 30 MG: 100 INJECTION SUBCUTANEOUS at 19:52

## 2022-02-02 RX ADMIN — MIRTAZAPINE 30 MG: 15 TABLET, FILM COATED ORAL at 19:53

## 2022-02-02 RX ADMIN — SODIUM CHLORIDE 1000 ML: 9 INJECTION, SOLUTION INTRAVENOUS at 13:13

## 2022-02-02 RX ADMIN — IOPAMIDOL 75 ML: 755 INJECTION, SOLUTION INTRAVENOUS at 13:38

## 2022-02-02 RX ADMIN — GUAIFENESIN 1200 MG: 600 TABLET, EXTENDED RELEASE ORAL at 19:53

## 2022-02-02 RX ADMIN — BUPRENORPHINE HYDROCHLORIDE AND NALOXONE HYDROCHLORIDE DIHYDRATE 1 TABLET: 8; 2 TABLET SUBLINGUAL at 19:53

## 2022-02-02 ASSESSMENT — ENCOUNTER SYMPTOMS
COUGH: 1
NAUSEA: 0
SHORTNESS OF BREATH: 1
VOMITING: 0
ABDOMINAL PAIN: 0
EYE REDNESS: 0

## 2022-02-02 ASSESSMENT — PAIN SCALES - GENERAL
PAINLEVEL_OUTOF10: 7
PAINLEVEL_OUTOF10: 1
PAINLEVEL_OUTOF10: 0
PAINLEVEL_OUTOF10: 5

## 2022-02-02 NOTE — ED NOTES
RN attempted to call Nurse to Nurse was put on hold, no answer, will try again later      Sydni Kerns  02/02/22 [id number]

## 2022-02-02 NOTE — H&P
[address] [place], [place] [address]ist Group   History and Physical      CHIEF COMPLAINT:  SOB    History of Present Illness:  73 y.o. female with a history of CAD (patient apparently had cath with Dr. Sheryl Ferrara in 2002 however records can not be found), depression/anxiety and suboxone use (prescribed) presents with SOB. The patient states that she became more SOB at home a couple days ago but used her PRN oxygen she had at home at around 3L and felt better. She denied any other symptoms or complaints. She states that she was at a lab office getting blood work and they sent her to the ER because her oxygen level was low. She denies any cough (other than her usual \"smoker's cough\", fever/chills, N/V/D or any other complaints. Informant(s) for H&P:Patient, EMR review    REVIEW OF SYSTEMS:  no fevers, chills, cp, sob, n/v, ha, vision/hearing changes, wt changes, hot/cold flashes, other open skin lesions, diarrhea, constipation, dysuria/hematuria unless noted in HPI. Complete ROS performed with the patient and is otherwise negative. PMH:  Past Medical History:   Diagnosis Date    Anxiety     Back pain, chronic     CAD (coronary artery disease)     Depression     History of cardiovascular stress test 1-    LEXISCAN STRESS    Hx of blood clots     Hyperlipemia     Hypertension     Pancreatitis        Surgical History:  Past Surgical History:   Procedure Laterality Date    BACK SURGERY      CORONARY ANGIOPLASTY WITH STENT PLACEMENT      ECHO COMPL W DOP COLOR FLOW  1/10/2013         HAND SURGERY Right 10/12/2019    HAND INCISION AND DRAINAGE performed by Anne Leyva DO at [address]          Medications Prior to Admission:    Prior to Admission medications    Medication Sig Start Date End Date Taking?  Authorizing Provider   mirtazapine (REMERON) 30 MG tablet Take 30 mg by mouth nightly   Yes Historical Provider, MD   busPIRone (BUSPAR) 5 MG tablet Take 5 mg by mouth 2 times daily   Yes Historical Provider, MD   guaiFENesin (MUCINEX) 600 MG extended release tablet Take 1,200 mg by mouth 2 times daily   Yes Historical Provider, MD   buprenorphine-naloxone (SUBOXONE) 8-2 MG FILM SL film Place 1 Film under the tongue 3 times daily. Friday   Yes Historical Provider, MD       Allergies:    Demerol [meperidine hcl], Ultram [tramadol], Compazine [prochlorperazine], Ibuprofen, Toradol [ketorolac tromethamine], and Tylenol with codeine #3 [acetaminophen-codeine]    Social History:    reports that she has been smoking cigarettes. She has been smoking about 0.50 packs per day. She has never used smokeless tobacco. She reports previous alcohol use. She reports that she does not use drugs. Family History:   family history includes Cancer in her brother; Other in her son.      PHYSICAL EXAM:  Vitals:  BP (!) 110/54   Pulse 75   Temp 97.8 °F (36.6 °C) (Infrared)   Resp 20   Ht 5' 5\" (1.651 m)   Wt 118 lb (53.5 kg)   SpO2 96%   BMI 19.64 kg/m²     General Appearance: alert and oriented to person, place and time and in no acute distress  Skin: warm and dry  Head: normocephalic and atraumatic  Eyes: pupils equal, round, and reactive to light, extraocular eye movements intact, conjunctivae normal  Neck: neck supple and non tender without mass   Pulmonary/Chest: expiratory wheezing throughout, reduced air movement throughout, no respiratory distress  Cardiovascular: normal rate, normal S1 and S2 and no carotid bruits  Abdomen: soft, non-tender, non-distended, normal bowel sounds, no masses or organomegaly  Extremities: no cyanosis, no clubbing and no edema  Neurologic: no cranial nerve deficit and speech normal    LABS:  Recent Labs     02/02/22  1143      K 3.4*      CO2 22   BUN 11   CREATININE 0.4*   GLUCOSE 89   CALCIUM 9.6       Recent Labs     02/02/22  1143   WBC 10.6   RBC 4.30   HGB 12.9   HCT 40.3   MCV 93.7   MCH 30.0   MCHC 32.0   RDW 13.1      MPV 10.8       No results for input(s): POCGLU in the last 72 hours. D-Dimer, Quantitative [[id number]]    Collected: 02/02/22 1143    Updated: 02/02/22 1226    Specimen Source: Blood     D-Dimer, Quant 675 ng/mL DDU         Radiology: XR CHEST PORTABLE    Result Date: 2/2/2022  EXAMINATION: ONE XRAY VIEW OF THE CHEST 2/2/2022 11:51 am COMPARISON: 10/12/2019 HISTORY: ORDERING SYSTEM PROVIDED HISTORY: shortness for breath TECHNOLOGIST PROVIDED HISTORY: Reason for exam:->shortness for breath FINDINGS: The lungs are without acute focal process. There are emphysematous changes. There is no effusion or pneumothorax. The cardiomediastinal silhouette is without acute process. The osseous structures are without acute process. No acute process. CTA CHEST W CONTRAST    Result Date: 2/2/2022  EXAMINATION: CTA OF THE CHEST 2/2/2022 1:34 pm TECHNIQUE: CTA of the chest was performed after the administration of intravenous contrast.  Multiplanar reformatted images are provided for review. MIP images are provided for review. Dose modulation, iterative reconstruction, and/or weight based adjustment of the mA/kV was utilized to reduce the radiation dose to as low as reasonably achievable. COMPARISON: None. HISTORY: ORDERING SYSTEM PROVIDED HISTORY: shortness fo breath TECHNOLOGIST PROVIDED HISTORY: Reason for exam:->shortness fo breath Decision Support Exception - unselect if not a suspected or confirmed emergency medical condition->Emergency Medical Condition (MA) FINDINGS: Pulmonary Arteries: Pulmonary arteries are adequately opacified for evaluation. No evidence of intraluminal filling defect to suggest pulmonary embolism. Main pulmonary artery is normal in caliber. Mediastinum: There is mild mediastinal adenopathy. A right paratracheal lymph node on image number 86 measures 8.3 mm in short axis, subcarinal lymph node prominence measures 11 mm in short axis on image 128.   Mild bilateral hilar adenopathy is noted. Largest right hilar lymph node is 11 mm on image 146. Aorta is nonaneurysmal.  Pulmonary artery is normal in size. Heart is normal in size with a mildly prominent left ventricle. No pericardial effusion. No evidence of axillary adenopathy. Lungs/pleura: Centrilobular upper lobe emphysema is noted bilaterally. There is a 3 mm nodule in the peripheral left upper lobe and a 7 mm nodule in the peripheral left lower lobe best seen on image number 88 series 4. There is cylindrical bronchiectasis in the lung bases, mild. Multiple areas of soft tissue density are seen within the lower lobe bronchi bilaterally with minimal centrilobular opacity and right lower lobe minimal alveolar consolidation. Linear scarring or atelectasis is noted in the medial lingular segment. Upper Abdomen: Images of the upper abdomen demonstrate mild biliary ductal prominence in the liver. The entire liver is not imaged. Visualized portions of the spleen appear normal adrenal glands appear normal in size. Soft Tissues/Bones: No acute bone or soft tissue abnormality. 1.  No CT evidence of pulmonary embolism. 2.  Mild upper lobe centrilobular emphysema. 3.  Mild lower lobe bronchiectasis with soft tissue density within multiple lower lobe bronchi posteriorly most compatible with mucous plugging. Focal area of consolidation in the right lower lobe suggestive of pneumonia. Probable reactive hilar adenopathy bilaterally. 4.  7 mm noncalcified soft tissue pulmonary nodule in the peripheral left lower lobe. RECOMMENDATIONS: Follow-up noncontrast CT chest in 3-6 months recommended. Fleischner Society guidelines for follow-up and management of incidentally detected pulmonary nodules: Multiple Solid Nodules: ~Nodule size equals 6-8 mm. In a low-risk patient, CT at 3-6 months, then consider CT at 18-24 months. In a high-risk patient, CT at 3-6 months, then CT at 18-24 months.  Radiology 2017 http://pubs. rsna.org/doi/full/10.1148/radiol. 6431375323       EKG: NSR    ASSESSMENT:      Active Problems:    Acute hypoxemic respiratory failure due to COVID-19 [facility])  Resolved Problems:    * No resolved hospital problems. *      PLAN:    1. Acute respiratory failure 2/2 COVID  -Patient currently on 5L NC, apparently has O2 at home from old admission and occasionally wears 3L  -CXR unremarkable, CTA showed no PE, mild lower lobe bronchiectasis consistent with mucous plugging and a 7mm pulmonary nodule in LLL  -Was given solumedrol loading dose in ER, will continue with solumedrol 40mg QD  -D-dimer elevated, consult pharmacy for possible baricitinib vs actemra  -Start duonebs, incentive spirometer and PEP/flutter. Encourage proning  -Check procal, strep/legionella  -Monitor O2 saturations    2. Mucous plugging  -Start mucomyst and PEP/flutter  -Monitor, if any worsening in condition may need pulm consult    3. Pulmonary nodule  -7mm, noncalcified  -Patient is a smoker  -Recommendations per radiologist to follow up non-contrast CT chest in 3-6 months    4. Suboxone use  -Patient states she was previously addicted to pain pills but now has been on suboxone  -Obtained a prescription record with dosing (8-2mg TID) from [facility], so will resume home med    5. Anxiety/depression  -History established, home meds buspar 5mg BID and remeron 30mg QHS - will continue    6. CAD  -Patient states she has a stent placed about 10 years ago  -Only records available in chart mention cath done in 2002 by Dr. Duncan Lion but no more details able to be obtained  -Not currently on any statin or aspirin, will try to get records and consider starting these medications. Code Status: Full  DVT prophylaxis: Lovenox 30mg BID    NOTE: This report was transcribed using voice recognition software.  Every effort was made to ensure accuracy; however, inadvertent computerized transcription errors may be present.     Electronically signed by Mathew Fischer MD on 2/2/2022 at 5:42 PM

## 2022-02-03 LAB
ALBUMIN SERPL-MCNC: 3.6 G/DL (ref 3.5–5.2)
ALP BLD-CCNC: 70 U/L (ref 35–104)
ALT SERPL-CCNC: 13 U/L (ref 0–32)
ANION GAP SERPL CALCULATED.3IONS-SCNC: 11 MMOL/L (ref 7–16)
AST SERPL-CCNC: 15 U/L (ref 0–31)
BASOPHILS ABSOLUTE: 0.01 E9/L (ref 0–0.2)
BASOPHILS RELATIVE PERCENT: 0.1 % (ref 0–2)
BILIRUB SERPL-MCNC: 0.3 MG/DL (ref 0–1.2)
BUN BLDV-MCNC: 12 MG/DL (ref 6–23)
C-REACTIVE PROTEIN: 3.6 MG/DL (ref 0–0.4)
CALCIUM SERPL-MCNC: 9.7 MG/DL (ref 8.6–10.2)
CHLORIDE BLD-SCNC: 107 MMOL/L (ref 98–107)
CO2: 24 MMOL/L (ref 22–29)
CREAT SERPL-MCNC: 0.5 MG/DL (ref 0.5–1)
EKG ATRIAL RATE: 86 BPM
EKG P AXIS: 64 DEGREES
EKG P-R INTERVAL: 158 MS
EKG Q-T INTERVAL: 386 MS
EKG QRS DURATION: 82 MS
EKG QTC CALCULATION (BAZETT): 461 MS
EKG R AXIS: 52 DEGREES
EKG T AXIS: 71 DEGREES
EKG VENTRICULAR RATE: 86 BPM
EOSINOPHILS ABSOLUTE: 0 E9/L (ref 0.05–0.5)
EOSINOPHILS RELATIVE PERCENT: 0 % (ref 0–6)
GFR AFRICAN AMERICAN: >60
GFR NON-AFRICAN AMERICAN: >60 ML/MIN/1.73
GLUCOSE BLD-MCNC: 103 MG/DL (ref 74–99)
HCT VFR BLD CALC: 37.7 % (ref 34–48)
HEMOGLOBIN: 12.2 G/DL (ref 11.5–15.5)
IMMATURE GRANULOCYTES #: 0.05 E9/L
IMMATURE GRANULOCYTES %: 0.4 % (ref 0–5)
L. PNEUMOPHILA SEROGP 1 UR AG: NORMAL
LACTATE DEHYDROGENASE: 190 U/L (ref 135–214)
LYMPHOCYTES ABSOLUTE: 1.51 E9/L (ref 1.5–4)
LYMPHOCYTES RELATIVE PERCENT: 12.2 % (ref 20–42)
MAGNESIUM: 2.2 MG/DL (ref 1.6–2.6)
MCH RBC QN AUTO: 30.3 PG (ref 26–35)
MCHC RBC AUTO-ENTMCNC: 32.4 % (ref 32–34.5)
MCV RBC AUTO: 93.5 FL (ref 80–99.9)
MONOCYTES ABSOLUTE: 1.24 E9/L (ref 0.1–0.95)
MONOCYTES RELATIVE PERCENT: 10 % (ref 2–12)
NEUTROPHILS ABSOLUTE: 9.59 E9/L (ref 1.8–7.3)
NEUTROPHILS RELATIVE PERCENT: 77.3 % (ref 43–80)
PDW BLD-RTO: 13.1 FL (ref 11.5–15)
PLATELET # BLD: 265 E9/L (ref 130–450)
PMV BLD AUTO: 11 FL (ref 7–12)
POTASSIUM REFLEX MAGNESIUM: 3.2 MMOL/L (ref 3.5–5)
RBC # BLD: 4.03 E12/L (ref 3.5–5.5)
SODIUM BLD-SCNC: 142 MMOL/L (ref 132–146)
STREP PNEUMONIAE ANTIGEN, URINE: NORMAL
TOTAL PROTEIN: 6.8 G/DL (ref 6.4–8.3)
WBC # BLD: 12.4 E9/L (ref 4.5–11.5)

## 2022-02-03 PROCEDURE — 2580000003 HC RX 258: Performed by: STUDENT IN AN ORGANIZED HEALTH CARE EDUCATION/TRAINING PROGRAM

## 2022-02-03 PROCEDURE — 99232 SBSQ HOSP IP/OBS MODERATE 35: CPT | Performed by: INTERNAL MEDICINE

## 2022-02-03 PROCEDURE — 6370000000 HC RX 637 (ALT 250 FOR IP): Performed by: STUDENT IN AN ORGANIZED HEALTH CARE EDUCATION/TRAINING PROGRAM

## 2022-02-03 PROCEDURE — 36415 COLL VENOUS BLD VENIPUNCTURE: CPT

## 2022-02-03 PROCEDURE — 93010 ELECTROCARDIOGRAM REPORT: CPT | Performed by: INTERNAL MEDICINE

## 2022-02-03 PROCEDURE — 2700000000 HC OXYGEN THERAPY PER DAY

## 2022-02-03 PROCEDURE — 80053 COMPREHEN METABOLIC PANEL: CPT

## 2022-02-03 PROCEDURE — 6370000000 HC RX 637 (ALT 250 FOR IP): Performed by: INTERNAL MEDICINE

## 2022-02-03 PROCEDURE — 2580000003 HC RX 258: Performed by: INTERNAL MEDICINE

## 2022-02-03 PROCEDURE — 85025 COMPLETE CBC W/AUTO DIFF WBC: CPT

## 2022-02-03 PROCEDURE — 83735 ASSAY OF MAGNESIUM: CPT

## 2022-02-03 PROCEDURE — 83615 LACTATE (LD) (LDH) ENZYME: CPT

## 2022-02-03 PROCEDURE — 94640 AIRWAY INHALATION TREATMENT: CPT

## 2022-02-03 PROCEDURE — 2060000000 HC ICU INTERMEDIATE R&B

## 2022-02-03 PROCEDURE — 6360000002 HC RX W HCPCS: Performed by: STUDENT IN AN ORGANIZED HEALTH CARE EDUCATION/TRAINING PROGRAM

## 2022-02-03 PROCEDURE — 2500000003 HC RX 250 WO HCPCS: Performed by: INTERNAL MEDICINE

## 2022-02-03 PROCEDURE — 86140 C-REACTIVE PROTEIN: CPT

## 2022-02-03 RX ORDER — 0.9 % SODIUM CHLORIDE 0.9 %
30 INTRAVENOUS SOLUTION INTRAVENOUS PRN
Status: DISCONTINUED | OUTPATIENT
Start: 2022-02-03 | End: 2022-02-15 | Stop reason: HOSPADM

## 2022-02-03 RX ORDER — POTASSIUM CHLORIDE 20 MEQ/1
40 TABLET, EXTENDED RELEASE ORAL ONCE
Status: COMPLETED | OUTPATIENT
Start: 2022-02-03 | End: 2022-02-03

## 2022-02-03 RX ADMIN — ACETAMINOPHEN 650 MG: 325 TABLET ORAL at 20:17

## 2022-02-03 RX ADMIN — Medication 10 ML: at 20:16

## 2022-02-03 RX ADMIN — ACETYLCYSTEINE 400 MG: 100 INHALANT RESPIRATORY (INHALATION) at 18:14

## 2022-02-03 RX ADMIN — GUAIFENESIN 1200 MG: 600 TABLET, EXTENDED RELEASE ORAL at 20:17

## 2022-02-03 RX ADMIN — Medication 2000 UNITS: at 08:42

## 2022-02-03 RX ADMIN — IPRATROPIUM BROMIDE AND ALBUTEROL SULFATE 1 AMPULE: .5; 2.5 SOLUTION RESPIRATORY (INHALATION) at 10:18

## 2022-02-03 RX ADMIN — REMDESIVIR 200 MG: 100 INJECTION, POWDER, LYOPHILIZED, FOR SOLUTION INTRAVENOUS at 17:58

## 2022-02-03 RX ADMIN — ENOXAPARIN SODIUM 30 MG: 100 INJECTION SUBCUTANEOUS at 20:19

## 2022-02-03 RX ADMIN — Medication 250 MG: at 08:42

## 2022-02-03 RX ADMIN — BUPRENORPHINE HYDROCHLORIDE AND NALOXONE HYDROCHLORIDE DIHYDRATE 1 TABLET: 8; 2 TABLET SUBLINGUAL at 08:42

## 2022-02-03 RX ADMIN — ZINC SULFATE 220 MG (50 MG) CAPSULE 50 MG: CAPSULE at 08:42

## 2022-02-03 RX ADMIN — GUAIFENESIN 1200 MG: 600 TABLET, EXTENDED RELEASE ORAL at 08:42

## 2022-02-03 RX ADMIN — ENOXAPARIN SODIUM 30 MG: 100 INJECTION SUBCUTANEOUS at 08:43

## 2022-02-03 RX ADMIN — BUSPIRONE HYDROCHLORIDE 5 MG: 5 TABLET ORAL at 08:42

## 2022-02-03 RX ADMIN — ACETAMINOPHEN 650 MG: 325 TABLET ORAL at 04:19

## 2022-02-03 RX ADMIN — BUPRENORPHINE HYDROCHLORIDE AND NALOXONE HYDROCHLORIDE DIHYDRATE 1 TABLET: 8; 2 TABLET SUBLINGUAL at 20:17

## 2022-02-03 RX ADMIN — ACETAMINOPHEN 650 MG: 325 TABLET ORAL at 14:26

## 2022-02-03 RX ADMIN — METHYLPREDNISOLONE SODIUM SUCCINATE 40 MG: 40 INJECTION, POWDER, FOR SOLUTION INTRAMUSCULAR; INTRAVENOUS at 08:43

## 2022-02-03 RX ADMIN — POTASSIUM CHLORIDE 40 MEQ: 1500 TABLET, EXTENDED RELEASE ORAL at 14:26

## 2022-02-03 RX ADMIN — IPRATROPIUM BROMIDE AND ALBUTEROL SULFATE 1 AMPULE: .5; 2.5 SOLUTION RESPIRATORY (INHALATION) at 22:52

## 2022-02-03 RX ADMIN — ACETYLCYSTEINE 400 MG: 100 INHALANT RESPIRATORY (INHALATION) at 14:07

## 2022-02-03 RX ADMIN — MIRTAZAPINE 30 MG: 15 TABLET, FILM COATED ORAL at 20:16

## 2022-02-03 RX ADMIN — IPRATROPIUM BROMIDE AND ALBUTEROL SULFATE 1 AMPULE: .5; 2.5 SOLUTION RESPIRATORY (INHALATION) at 07:07

## 2022-02-03 RX ADMIN — Medication 10 ML: at 08:44

## 2022-02-03 RX ADMIN — IPRATROPIUM BROMIDE AND ALBUTEROL SULFATE 1 AMPULE: .5; 2.5 SOLUTION RESPIRATORY (INHALATION) at 14:07

## 2022-02-03 RX ADMIN — ACETYLCYSTEINE 400 MG: 100 INHALANT RESPIRATORY (INHALATION) at 10:18

## 2022-02-03 RX ADMIN — PYRIDOXINE HCL TAB 50 MG 50 MG: 50 TAB at 08:42

## 2022-02-03 RX ADMIN — ACETYLCYSTEINE 400 MG: 100 INHALANT RESPIRATORY (INHALATION) at 07:07

## 2022-02-03 RX ADMIN — BUSPIRONE HYDROCHLORIDE 5 MG: 5 TABLET ORAL at 20:17

## 2022-02-03 RX ADMIN — BUPRENORPHINE HYDROCHLORIDE AND NALOXONE HYDROCHLORIDE DIHYDRATE 1 TABLET: 8; 2 TABLET SUBLINGUAL at 14:26

## 2022-02-03 RX ADMIN — ACETYLCYSTEINE 400 MG: 100 INHALANT RESPIRATORY (INHALATION) at 22:52

## 2022-02-03 RX ADMIN — IPRATROPIUM BROMIDE AND ALBUTEROL SULFATE 1 AMPULE: .5; 2.5 SOLUTION RESPIRATORY (INHALATION) at 18:14

## 2022-02-03 ASSESSMENT — PAIN SCALES - GENERAL
PAINLEVEL_OUTOF10: 0
PAINLEVEL_OUTOF10: 0
PAINLEVEL_OUTOF10: 2
PAINLEVEL_OUTOF10: 8
PAINLEVEL_OUTOF10: 1

## 2022-02-03 ASSESSMENT — PAIN DESCRIPTION - ONSET: ONSET: GRADUAL

## 2022-02-03 ASSESSMENT — PAIN DESCRIPTION - FREQUENCY: FREQUENCY: INTERMITTENT

## 2022-02-03 ASSESSMENT — PAIN DESCRIPTION - LOCATION: LOCATION: HEAD

## 2022-02-03 ASSESSMENT — PAIN DESCRIPTION - PAIN TYPE: TYPE: ACUTE PAIN

## 2022-02-03 ASSESSMENT — PAIN DESCRIPTION - DESCRIPTORS: DESCRIPTORS: ACHING

## 2022-02-03 NOTE — PROGRESS NOTES
Admitting Date and Time: 2/2/2022 11:15 AM  Admit Dx: Acute hypoxemic respiratory failure due to COVID-19 ([facility]) [U07.1, J96.01]    Subjective:  Still sob    ROS: denies fever, chills, cp, n/v, HA unless stated above.      sodium chloride flush  5-40 mL IntraVENous 2 times per day    enoxaparin  30 mg SubCUTAneous BID    Vitamin D  2,000 Units Oral Daily    zinc sulfate  50 mg Oral Daily    vitamin B-6  50 mg Oral Daily    vitamin C  250 mg Oral Daily    ipratropium-albuterol  1 ampule Inhalation Q4H WA    acetylcysteine  4 mL Inhalation Q4H    busPIRone  5 mg Oral BID    guaiFENesin  1,200 mg Oral BID    mirtazapine  30 mg Oral Nightly    buprenorphine-naloxone  1 tablet SubLINGual TID    methylPREDNISolone  40 mg IntraVENous Daily     sodium chloride flush, 5-40 mL, PRN  sodium chloride, 25 mL, PRN  ondansetron, 4 mg, Q8H PRN   Or  ondansetron, 4 mg, Q6H PRN  polyethylene glycol, 17 g, Daily PRN  acetaminophen, 650 mg, Q6H PRN   Or  acetaminophen, 650 mg, Q6H PRN         Objective:    BP (!) 115/47   Pulse 87   Temp 98 °F (36.7 °C) (Infrared)   Resp 20   Ht 5' 5\" (1.651 m)   Wt 118 lb (53.5 kg)   SpO2 97%   BMI 19.64 kg/m²   General Appearance: alert and oriented to person, place and time and in no acute distress  Skin: warm and dry  Head: normocephalic and atraumatic  Eyes: pupils equal, round, and reactive to light, extraocular eye movements intact, conjunctivae normal  Neck: neck supple and non tender without mass   Pulmonary/Chest: clear to auscultation bilaterally- no wheezes, rales or rhonchi, normal air movement, no respiratory distress  Cardiovascular: normal rate, normal S1 and S2 and no carotid bruits  Abdomen: soft, non-tender, non-distended, normal bowel sounds, no masses or organomegaly  Extremities: no cyanosis, no clubbing and no  edema  Neurologic: no cranial nerve deficit and speech normal      Recent Labs     02/02/22  1143 02/03/22  0826    142   K 3.4* 3.2*    107 became more SOB at home a couple days ago but used her PRN oxygen she had at home at around 3L and felt better. She denied any other symptoms or complaints. She states that she was at a lab office getting blood work and they sent her to the ER because her oxygen level was low. She denies any cough (other than her usual \"smoker's cough\", fever/chills, N/V/D or any other complaints.        1. Acute on chronic respiratory failure due to's COVID-19 pneumonia's. CTA showed mild lower lobe bronchiectasis and pulmonary nodule in the left lower lobe. Continuing Solu-Medrol 40 each day duo nebs incentive spirometry flutter valve encourage proning. Procalcitonin low at 0.04. Legionella and strep pneumonia antigen negative  2. Per kalemia supplemented on 2/3 recheck on 2/4  3. Mucus plugging on Mucomyst and flutter valve monitor O2 need. 4. Pulmonary nodule and smoker needs follow-up CT scan as an outpatient. 5. Bacot abuse counseled not to smoke  6. 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 Mood disorder continuing home dose of BuSpar 5 twice daily and Remeron 30 nightly  8. Coronary artery disease seemingly noncompliant to stent 10 and aspirin but has had stent placed 10 years ago records from 2002 are available she will need follow-up. 9. Full code  10. VTE prophylaxis Lovenox 30 twice daily  11. Disposition Home when improved    NOTE: This report was transcribed using voice recognition software. Every effort was made to ensure accuracy; however, inadvertent computerized transcription errors may be present.      Electronically signed by Helen Aguirre MD on 2/3/2022 at 2:50 PM

## 2022-02-03 NOTE — CARE COORDINATION
2-3-Cm note: ( covid pos 2-2) spoke to pt for transition of care needs, pt lives with her granddaughter , she has PRN oxygen 2l  from [facility], pt attends [facility] every 2 weeks for Suboxone treatment, pt denies any needs for dc , her granddaughter will provide transport home.  Electronically signed by Daniele Garrido RN on 2/3/2022 at 3:33 PM

## 2022-02-03 NOTE — ACP (ADVANCE CARE PLANNING)
Advance Care Planning     Advance Care Planning Activator (Inpatient)  Conversation Note      Date of ACP Conversation: 2/3/2022     Conversation Conducted with: Patient with Decision Making Capacity    ACP Activator: Merlyn Darnell RN        Health Care Decision Maker:     Current Designated Health Care Decision Maker:     Primary Decision Maker: Yonathan Mccurdy - [phone]    Secondary Decision Maker: Marisa Dumont - [phone]      Care Preferences    Ventilation: \"If you were in your present state of health and suddenly became very ill and were unable to breathe on your own, what would your preference be about the use of a ventilator (breathing machine) if it were available to you? \"      Would the patient desire the use of ventilator (breathing machine)?: no  \"If your health worsens and it becomes clear that your chance of recovery is unlikely, what would your preference be about the use of a ventilator (breathing machine) if it were available to you? \"     Would the patient desire the use of ventilator (breathing machine)?: No      Resuscitation  \"CPR works best to restart the heart when there is a sudden event, like a heart attack, in someone who is otherwise healthy. Unfortunately, CPR does not typically restart the heart for people who have serious health conditions or who are very sick. \"    \"In the event your heart stopped as a result of an underlying serious health condition, would you want attempts to be made to restart your heart (answer \"yes\" for attempt to resuscitate) or would you prefer a natural death (answer \"no\" for do not attempt to resuscitate)? \" yes       [] Yes   [] No   Educated Patient / Padmini Garcia regarding differences between Advance Directives and portable DNR orders.     Length of ACP Conversation in minutes:      Conversation Outcomes:  [x] ACP discussion completed  [] Existing advance directive reviewed with patient; no changes to patient's previously recorded wishes  [] New Advance Directive completed  [] Portable Do Not Rescitate prepared for Provider review and signature  [] POLST/POST/MOLST/MOST prepared for Provider review and signature      Follow-up plan:    [] Schedule follow-up conversation to continue planning  [] Referred individual to Provider for additional questions/concerns   [] Advised patient/agent/surrogate to review completed ACP document and update if needed with changes in condition, patient preferences or care setting    [x] This note routed to one or more involved healthcare providers

## 2022-02-03 NOTE — PROGRESS NOTES
Pharmacy Covid-19 Note      Pharmacy consulted for baricitinib vs tocilizumab    A stat LDH and CRP were ordered to determine if patient is a candidate for therapy. These orders have been cancelled and ordered to run instead in the morning with morning labs. Pharmacy will review results in the morning and determine if patient meets criteria for either medication.       Jignesh Kennedy, PharmD 2/2/2022 7:20 PM   [phone]

## 2022-02-04 LAB
ALBUMIN SERPL-MCNC: 3.7 G/DL (ref 3.5–5.2)
ALP BLD-CCNC: 79 U/L (ref 35–104)
ALT SERPL-CCNC: 14 U/L (ref 0–32)
ANION GAP SERPL CALCULATED.3IONS-SCNC: 10 MMOL/L (ref 7–16)
AST SERPL-CCNC: 16 U/L (ref 0–31)
BILIRUB SERPL-MCNC: 0.2 MG/DL (ref 0–1.2)
BUN BLDV-MCNC: 16 MG/DL (ref 6–23)
CALCIUM SERPL-MCNC: 10 MG/DL (ref 8.6–10.2)
CHLORIDE BLD-SCNC: 103 MMOL/L (ref 98–107)
CO2: 26 MMOL/L (ref 22–29)
CREAT SERPL-MCNC: 0.6 MG/DL (ref 0.5–1)
GFR AFRICAN AMERICAN: >60
GFR NON-AFRICAN AMERICAN: >60 ML/MIN/1.73
GLUCOSE BLD-MCNC: 103 MG/DL (ref 74–99)
POTASSIUM SERPL-SCNC: 4.2 MMOL/L (ref 3.5–5)
SODIUM BLD-SCNC: 139 MMOL/L (ref 132–146)
TOTAL PROTEIN: 6.9 G/DL (ref 6.4–8.3)

## 2022-02-04 PROCEDURE — 2500000003 HC RX 250 WO HCPCS: Performed by: INTERNAL MEDICINE

## 2022-02-04 PROCEDURE — 6370000000 HC RX 637 (ALT 250 FOR IP): Performed by: STUDENT IN AN ORGANIZED HEALTH CARE EDUCATION/TRAINING PROGRAM

## 2022-02-04 PROCEDURE — 2700000000 HC OXYGEN THERAPY PER DAY

## 2022-02-04 PROCEDURE — 2580000003 HC RX 258: Performed by: INTERNAL MEDICINE

## 2022-02-04 PROCEDURE — 36415 COLL VENOUS BLD VENIPUNCTURE: CPT

## 2022-02-04 PROCEDURE — 99233 SBSQ HOSP IP/OBS HIGH 50: CPT | Performed by: INTERNAL MEDICINE

## 2022-02-04 PROCEDURE — 94640 AIRWAY INHALATION TREATMENT: CPT

## 2022-02-04 PROCEDURE — 6360000002 HC RX W HCPCS: Performed by: STUDENT IN AN ORGANIZED HEALTH CARE EDUCATION/TRAINING PROGRAM

## 2022-02-04 PROCEDURE — 2580000003 HC RX 258: Performed by: STUDENT IN AN ORGANIZED HEALTH CARE EDUCATION/TRAINING PROGRAM

## 2022-02-04 PROCEDURE — 80053 COMPREHEN METABOLIC PANEL: CPT

## 2022-02-04 PROCEDURE — 2060000000 HC ICU INTERMEDIATE R&B

## 2022-02-04 PROCEDURE — XW033E5 INTRODUCTION OF REMDESIVIR ANTI-INFECTIVE INTO PERIPHERAL VEIN, PERCUTANEOUS APPROACH, NEW TECHNOLOGY GROUP 5: ICD-10-PCS | Performed by: STUDENT IN AN ORGANIZED HEALTH CARE EDUCATION/TRAINING PROGRAM

## 2022-02-04 RX ADMIN — Medication 2000 UNITS: at 08:23

## 2022-02-04 RX ADMIN — Medication 10 ML: at 08:23

## 2022-02-04 RX ADMIN — ENOXAPARIN SODIUM 30 MG: 100 INJECTION SUBCUTANEOUS at 21:17

## 2022-02-04 RX ADMIN — BUPRENORPHINE HYDROCHLORIDE AND NALOXONE HYDROCHLORIDE DIHYDRATE 1 TABLET: 8; 2 TABLET SUBLINGUAL at 13:44

## 2022-02-04 RX ADMIN — Medication 250 MG: at 08:23

## 2022-02-04 RX ADMIN — BUSPIRONE HYDROCHLORIDE 5 MG: 5 TABLET ORAL at 08:27

## 2022-02-04 RX ADMIN — ACETYLCYSTEINE 400 MG: 100 INHALANT RESPIRATORY (INHALATION) at 13:51

## 2022-02-04 RX ADMIN — IPRATROPIUM BROMIDE AND ALBUTEROL SULFATE 1 AMPULE: .5; 2.5 SOLUTION RESPIRATORY (INHALATION) at 09:23

## 2022-02-04 RX ADMIN — ACETYLCYSTEINE 400 MG: 100 INHALANT RESPIRATORY (INHALATION) at 09:23

## 2022-02-04 RX ADMIN — IPRATROPIUM BROMIDE AND ALBUTEROL SULFATE 1 AMPULE: .5; 2.5 SOLUTION RESPIRATORY (INHALATION) at 01:55

## 2022-02-04 RX ADMIN — IPRATROPIUM BROMIDE AND ALBUTEROL SULFATE 1 AMPULE: .5; 2.5 SOLUTION RESPIRATORY (INHALATION) at 13:51

## 2022-02-04 RX ADMIN — IPRATROPIUM BROMIDE AND ALBUTEROL SULFATE 1 AMPULE: .5; 2.5 SOLUTION RESPIRATORY (INHALATION) at 21:06

## 2022-02-04 RX ADMIN — ACETYLCYSTEINE 400 MG: 100 INHALANT RESPIRATORY (INHALATION) at 05:45

## 2022-02-04 RX ADMIN — ACETYLCYSTEINE 400 MG: 100 INHALANT RESPIRATORY (INHALATION) at 21:06

## 2022-02-04 RX ADMIN — GUAIFENESIN 1200 MG: 600 TABLET, EXTENDED RELEASE ORAL at 08:27

## 2022-02-04 RX ADMIN — MIRTAZAPINE 30 MG: 15 TABLET, FILM COATED ORAL at 21:16

## 2022-02-04 RX ADMIN — METHYLPREDNISOLONE SODIUM SUCCINATE 40 MG: 40 INJECTION, POWDER, FOR SOLUTION INTRAMUSCULAR; INTRAVENOUS at 08:22

## 2022-02-04 RX ADMIN — Medication 10 ML: at 21:17

## 2022-02-04 RX ADMIN — REMDESIVIR 100 MG: 100 INJECTION, POWDER, LYOPHILIZED, FOR SOLUTION INTRAVENOUS at 13:44

## 2022-02-04 RX ADMIN — BUPRENORPHINE HYDROCHLORIDE AND NALOXONE HYDROCHLORIDE DIHYDRATE 1 TABLET: 8; 2 TABLET SUBLINGUAL at 21:17

## 2022-02-04 RX ADMIN — IPRATROPIUM BROMIDE AND ALBUTEROL SULFATE 1 AMPULE: .5; 2.5 SOLUTION RESPIRATORY (INHALATION) at 17:19

## 2022-02-04 RX ADMIN — GUAIFENESIN 1200 MG: 600 TABLET, EXTENDED RELEASE ORAL at 22:27

## 2022-02-04 RX ADMIN — ZINC SULFATE 220 MG (50 MG) CAPSULE 50 MG: CAPSULE at 08:22

## 2022-02-04 RX ADMIN — ACETYLCYSTEINE 400 MG: 100 INHALANT RESPIRATORY (INHALATION) at 17:19

## 2022-02-04 RX ADMIN — PYRIDOXINE HCL TAB 50 MG 50 MG: 50 TAB at 08:23

## 2022-02-04 RX ADMIN — ACETYLCYSTEINE 400 MG: 100 INHALANT RESPIRATORY (INHALATION) at 01:55

## 2022-02-04 RX ADMIN — ACETAMINOPHEN 650 MG: 325 TABLET ORAL at 04:48

## 2022-02-04 RX ADMIN — BUPRENORPHINE HYDROCHLORIDE AND NALOXONE HYDROCHLORIDE DIHYDRATE 1 TABLET: 8; 2 TABLET SUBLINGUAL at 08:24

## 2022-02-04 RX ADMIN — ENOXAPARIN SODIUM 30 MG: 100 INJECTION SUBCUTANEOUS at 08:22

## 2022-02-04 RX ADMIN — IPRATROPIUM BROMIDE AND ALBUTEROL SULFATE 1 AMPULE: .5; 2.5 SOLUTION RESPIRATORY (INHALATION) at 05:46

## 2022-02-04 RX ADMIN — BUSPIRONE HYDROCHLORIDE 5 MG: 5 TABLET ORAL at 21:17

## 2022-02-04 ASSESSMENT — PAIN SCALES - GENERAL
PAINLEVEL_OUTOF10: 2
PAINLEVEL_OUTOF10: 0
PAINLEVEL_OUTOF10: 0

## 2022-02-04 NOTE — CARE COORDINATION
New PCP and follow up appt made for patient with Dr. Paula Haynes at [facility] [place] on [address] Feb 22 @ 8:30 AM. Electronically signed by Ira Dee on 2/4/2022 at 11:58 AM

## 2022-02-04 NOTE — PROGRESS NOTES
edema  Neurologic: no cranial nerve deficit and speech normal      Recent Labs     02/02/22  1143 02/03/22  0826 02/04/22  0521    142 139   K 3.4* 3.2* 4.2    107 103   CO2 22 24 26   BUN 11 12 16   CREATININE 0.4* 0.5 0.6   GLUCOSE 89 103* 103*   CALCIUM 9.6 9.7 10.0       Recent Labs     02/02/22  1143 02/03/22  0826 02/04/22  0521   ALKPHOS 74 70 79   PROT 7.0 6.8 6.9   LABALBU 3.6 3.6 3.7   BILITOT 0.3 0.3 0.2   AST 19 15 16   ALT 13 13 14       Recent Labs     02/02/22  1143 02/03/22  0826   WBC 10.6 12.4*   RBC 4.30 4.03   HGB 12.9 12.2   HCT 40.3 37.7   MCV 93.7 93.5   MCH 30.0 30.3   MCHC 32.0 32.4   RDW 13.1 13.1    265   MPV 10.8 11.0           Radiology:   CTA CHEST W CONTRAST   Final Result   1. No CT evidence of pulmonary embolism. 2.  Mild upper lobe centrilobular emphysema. 3.  Mild lower lobe bronchiectasis with soft tissue density within multiple   lower lobe bronchi posteriorly most compatible with mucous plugging. Focal   area of consolidation in the right lower lobe suggestive of pneumonia. Probable reactive hilar adenopathy bilaterally. 4.  7 mm noncalcified soft tissue pulmonary nodule in the peripheral left   lower lobe. RECOMMENDATIONS:   Follow-up noncontrast CT chest in 3-6 months recommended. Fleischner Society guidelines for follow-up and management of incidentally   detected pulmonary nodules:      Multiple Solid Nodules:      ~Nodule size equals 6-8 mm. In a low-risk patient, CT at 3-6 months, then   consider CT at 18-24 months. In a high-risk patient, CT at 3-6 months, then   CT at 18-24 months. Radiology 2017 http://pubs. rsna.org/doi/full/10.1148/radiol. 1939153884         XR CHEST PORTABLE   Final Result   No acute process. Assessment:  Active Problems:    Acute hypoxemic respiratory failure due to COVID-19 [facility])  Resolved Problems:    * No resolved hospital problems.  *      Plan: History of present illness from History and Physical:  73 y.o. female with a history of CAD (patient apparently had cath with Dr. Jazmnie Gamez in 2002 however records can not be found), depression/anxiety and suboxone use (prescribed) presents with SOB. The patient states that she became more SOB at home a couple days ago but used her PRN oxygen she had at home at around 3L and felt better. She denied any other symptoms or complaints. She states that she was at a lab office getting blood work and they sent her to the ER because her oxygen level was low. She denies any cough (other than her usual \"smoker's cough\", fever/chills, N/V/D or any other complaints.        1. Acute on chronic respiratory failure due to's COVID-19 pneumonia's. CTA showed mild lower lobe bronchiectasis and pulmonary nodule in the left lower lobe. Continuing remdesivir, Solu-Medrol 40 each day duo nebs incentive spirometry flutter valve encourage proning. Procalcitonin low at 0.04. Legionella and strep pneumonia antigen negative  2. Hypo-kalemia supplemented on 2/3 potassium is within normal limits today  3. Mucus plugging on Mucomyst and flutter valve monitor O2 need. 4. Pulmonary nodule and smoker needs follow-up CT scan as an outpatient.,  In 3 to 4 months and discussed with patient in details  5. Tobacco abuse counseled not to smoke. We will give her tobacco cessation counseling  6. Narcotic misuse syndrome on Suboxone from [address] [place] [address] 3 times daily on home dose now  7. Mood disorder continuing home dose of BuSpar 5 twice daily and Remeron 30 nightly  8. Coronary artery disease seemingly noncompliant to stent 10 and aspirin but has had stent placed 10 years ago records from 2002 are available she will need follow-up. As outpatient with [facility] cardiology  9. Full code  10. VTE prophylaxis Lovenox 30 twice daily  11. Disposition Home when improved. 12. Patient seems to be really recovering well.   Possible discharge tomorrow , will probably need home oxygen, will evaluate    NOTE: This report was transcribed using voice recognition software. Every effort was made to ensure accuracy; however, inadvertent computerized transcription errors may be present.      Electronically signed by Tomy Warner MD on 2/4/2022 at 11:53 AM

## 2022-02-05 LAB
ALBUMIN SERPL-MCNC: 3.4 G/DL (ref 3.5–5.2)
ALP BLD-CCNC: 71 U/L (ref 35–104)
ALT SERPL-CCNC: 14 U/L (ref 0–32)
ANION GAP SERPL CALCULATED.3IONS-SCNC: 9 MMOL/L (ref 7–16)
AST SERPL-CCNC: 11 U/L (ref 0–31)
BILIRUB SERPL-MCNC: 0.2 MG/DL (ref 0–1.2)
BUN BLDV-MCNC: 21 MG/DL (ref 6–23)
CALCIUM SERPL-MCNC: 9.8 MG/DL (ref 8.6–10.2)
CHLORIDE BLD-SCNC: 105 MMOL/L (ref 98–107)
CO2: 28 MMOL/L (ref 22–29)
CREAT SERPL-MCNC: 0.5 MG/DL (ref 0.5–1)
GFR AFRICAN AMERICAN: >60
GFR NON-AFRICAN AMERICAN: >60 ML/MIN/1.73
GLUCOSE BLD-MCNC: 93 MG/DL (ref 74–99)
POTASSIUM SERPL-SCNC: 4 MMOL/L (ref 3.5–5)
PROCALCITONIN: 0.07 NG/ML (ref 0–0.08)
SODIUM BLD-SCNC: 142 MMOL/L (ref 132–146)
TOTAL PROTEIN: 6.6 G/DL (ref 6.4–8.3)

## 2022-02-05 PROCEDURE — 2700000000 HC OXYGEN THERAPY PER DAY

## 2022-02-05 PROCEDURE — 80053 COMPREHEN METABOLIC PANEL: CPT

## 2022-02-05 PROCEDURE — 6370000000 HC RX 637 (ALT 250 FOR IP): Performed by: STUDENT IN AN ORGANIZED HEALTH CARE EDUCATION/TRAINING PROGRAM

## 2022-02-05 PROCEDURE — 94640 AIRWAY INHALATION TREATMENT: CPT

## 2022-02-05 PROCEDURE — 6360000002 HC RX W HCPCS: Performed by: STUDENT IN AN ORGANIZED HEALTH CARE EDUCATION/TRAINING PROGRAM

## 2022-02-05 PROCEDURE — 2060000000 HC ICU INTERMEDIATE R&B

## 2022-02-05 PROCEDURE — 94669 MECHANICAL CHEST WALL OSCILL: CPT

## 2022-02-05 PROCEDURE — 2500000003 HC RX 250 WO HCPCS: Performed by: INTERNAL MEDICINE

## 2022-02-05 PROCEDURE — 2580000003 HC RX 258: Performed by: INTERNAL MEDICINE

## 2022-02-05 PROCEDURE — 36415 COLL VENOUS BLD VENIPUNCTURE: CPT

## 2022-02-05 PROCEDURE — 2580000003 HC RX 258: Performed by: STUDENT IN AN ORGANIZED HEALTH CARE EDUCATION/TRAINING PROGRAM

## 2022-02-05 PROCEDURE — 84145 PROCALCITONIN (PCT): CPT

## 2022-02-05 PROCEDURE — 99232 SBSQ HOSP IP/OBS MODERATE 35: CPT | Performed by: INTERNAL MEDICINE

## 2022-02-05 RX ORDER — ACETYLCYSTEINE 100 MG/ML
4 SOLUTION ORAL; RESPIRATORY (INHALATION) 2 TIMES DAILY
Status: DISCONTINUED | OUTPATIENT
Start: 2022-02-06 | End: 2022-02-15 | Stop reason: HOSPADM

## 2022-02-05 RX ADMIN — IPRATROPIUM BROMIDE AND ALBUTEROL SULFATE 1 AMPULE: .5; 2.5 SOLUTION RESPIRATORY (INHALATION) at 18:02

## 2022-02-05 RX ADMIN — IPRATROPIUM BROMIDE AND ALBUTEROL SULFATE 1 AMPULE: .5; 2.5 SOLUTION RESPIRATORY (INHALATION) at 00:52

## 2022-02-05 RX ADMIN — MIRTAZAPINE 30 MG: 15 TABLET, FILM COATED ORAL at 21:39

## 2022-02-05 RX ADMIN — ACETAMINOPHEN 650 MG: 325 TABLET ORAL at 08:45

## 2022-02-05 RX ADMIN — IPRATROPIUM BROMIDE AND ALBUTEROL SULFATE 1 AMPULE: .5; 2.5 SOLUTION RESPIRATORY (INHALATION) at 06:27

## 2022-02-05 RX ADMIN — ACETYLCYSTEINE 400 MG: 100 INHALANT RESPIRATORY (INHALATION) at 00:52

## 2022-02-05 RX ADMIN — Medication 2000 UNITS: at 08:46

## 2022-02-05 RX ADMIN — METHYLPREDNISOLONE SODIUM SUCCINATE 40 MG: 40 INJECTION, POWDER, FOR SOLUTION INTRAMUSCULAR; INTRAVENOUS at 08:46

## 2022-02-05 RX ADMIN — ACETAMINOPHEN 650 MG: 325 TABLET ORAL at 21:45

## 2022-02-05 RX ADMIN — BUPRENORPHINE HYDROCHLORIDE AND NALOXONE HYDROCHLORIDE DIHYDRATE 1 TABLET: 8; 2 TABLET SUBLINGUAL at 14:10

## 2022-02-05 RX ADMIN — ACETYLCYSTEINE 400 MG: 100 INHALANT RESPIRATORY (INHALATION) at 06:27

## 2022-02-05 RX ADMIN — ENOXAPARIN SODIUM 30 MG: 100 INJECTION SUBCUTANEOUS at 21:40

## 2022-02-05 RX ADMIN — BUPRENORPHINE HYDROCHLORIDE AND NALOXONE HYDROCHLORIDE DIHYDRATE 1 TABLET: 8; 2 TABLET SUBLINGUAL at 21:40

## 2022-02-05 RX ADMIN — ZINC SULFATE 220 MG (50 MG) CAPSULE 50 MG: CAPSULE at 08:46

## 2022-02-05 RX ADMIN — BUPRENORPHINE HYDROCHLORIDE AND NALOXONE HYDROCHLORIDE DIHYDRATE 1 TABLET: 8; 2 TABLET SUBLINGUAL at 08:46

## 2022-02-05 RX ADMIN — ENOXAPARIN SODIUM 30 MG: 100 INJECTION SUBCUTANEOUS at 08:46

## 2022-02-05 RX ADMIN — ONDANSETRON 4 MG: 2 INJECTION INTRAMUSCULAR; INTRAVENOUS at 10:30

## 2022-02-05 RX ADMIN — IPRATROPIUM BROMIDE AND ALBUTEROL SULFATE 1 AMPULE: .5; 2.5 SOLUTION RESPIRATORY (INHALATION) at 09:40

## 2022-02-05 RX ADMIN — GUAIFENESIN 1200 MG: 600 TABLET, EXTENDED RELEASE ORAL at 21:39

## 2022-02-05 RX ADMIN — IPRATROPIUM BROMIDE AND ALBUTEROL SULFATE 1 AMPULE: .5; 2.5 SOLUTION RESPIRATORY (INHALATION) at 14:04

## 2022-02-05 RX ADMIN — Medication 10 ML: at 08:46

## 2022-02-05 RX ADMIN — BUSPIRONE HYDROCHLORIDE 5 MG: 5 TABLET ORAL at 21:40

## 2022-02-05 RX ADMIN — Medication 250 MG: at 08:45

## 2022-02-05 RX ADMIN — GUAIFENESIN 1200 MG: 600 TABLET, EXTENDED RELEASE ORAL at 08:46

## 2022-02-05 RX ADMIN — ACETYLCYSTEINE 400 MG: 100 INHALANT RESPIRATORY (INHALATION) at 09:40

## 2022-02-05 RX ADMIN — BUSPIRONE HYDROCHLORIDE 5 MG: 5 TABLET ORAL at 08:45

## 2022-02-05 RX ADMIN — REMDESIVIR 100 MG: 100 INJECTION, POWDER, LYOPHILIZED, FOR SOLUTION INTRAVENOUS at 14:10

## 2022-02-05 RX ADMIN — ACETYLCYSTEINE 400 MG: 100 INHALANT RESPIRATORY (INHALATION) at 14:04

## 2022-02-05 RX ADMIN — PYRIDOXINE HCL TAB 50 MG 50 MG: 50 TAB at 08:46

## 2022-02-05 RX ADMIN — Medication 10 ML: at 21:40

## 2022-02-05 ASSESSMENT — PAIN SCALES - GENERAL
PAINLEVEL_OUTOF10: 5
PAINLEVEL_OUTOF10: 0
PAINLEVEL_OUTOF10: 5
PAINLEVEL_OUTOF10: 5
PAINLEVEL_OUTOF10: 0

## 2022-02-05 ASSESSMENT — PAIN DESCRIPTION - LOCATION: LOCATION: HEAD

## 2022-02-05 ASSESSMENT — PAIN DESCRIPTION - DESCRIPTORS: DESCRIPTORS: ACHING

## 2022-02-05 ASSESSMENT — PAIN DESCRIPTION - FREQUENCY: FREQUENCY: INTERMITTENT

## 2022-02-05 ASSESSMENT — PAIN DESCRIPTION - PAIN TYPE: TYPE: ACUTE PAIN

## 2022-02-05 ASSESSMENT — PAIN DESCRIPTION - ONSET: ONSET: GRADUAL

## 2022-02-05 ASSESSMENT — PAIN - FUNCTIONAL ASSESSMENT: PAIN_FUNCTIONAL_ASSESSMENT: ACTIVITIES ARE NOT PREVENTED

## 2022-02-05 NOTE — PROGRESS NOTES
Admitting Date and Time: 2/2/2022 11:15 AM  Admit Dx: Acute hypoxemic respiratory failure due to COVID-19 ([facility] [U07.1, J96.01]    Subjective:  Feels better and her breathing is better, laying in bed no acute distress    ROS: denies fever, chills, cp, n/v,     remdesivir IVPB  100 mg IntraVENous Q24H    sodium chloride flush  5-40 mL IntraVENous 2 times per day    enoxaparin  30 mg SubCUTAneous BID    Vitamin D  2,000 Units Oral Daily    zinc sulfate  50 mg Oral Daily    vitamin B-6  50 mg Oral Daily    vitamin C  250 mg Oral Daily    ipratropium-albuterol  1 ampule Inhalation Q4H WA    acetylcysteine  4 mL Inhalation Q4H    busPIRone  5 mg Oral BID    guaiFENesin  1,200 mg Oral BID    mirtazapine  30 mg Oral Nightly    buprenorphine-naloxone  1 tablet SubLINGual TID    methylPREDNISolone  40 mg IntraVENous Daily     sodium chloride, 30 mL, PRN  sodium chloride flush, 5-40 mL, PRN  sodium chloride, 25 mL, PRN  ondansetron, 4 mg, Q8H PRN   Or  ondansetron, 4 mg, Q6H PRN  polyethylene glycol, 17 g, Daily PRN  acetaminophen, 650 mg, Q6H PRN   Or  acetaminophen, 650 mg, Q6H PRN         Objective:    /60   Pulse 85   Temp 97.8 °F (36.6 °C) (Infrared)   Resp 18   Ht 5' 5\" (1.651 m)   Wt 115 lb 11.9 oz (52.5 kg)   SpO2 (!) 89%   BMI 19.26 kg/m²   General Appearance: alert and oriented to person, place and time and in no acute distress  Head: normocephalic and atraumatic  Eyes: pupils equal, round, and reactive to light, extraocular eye movements intact, conjunctivae normal  Neck: neck supple and non tender without mass   Pulmonary/Chest: Coarse rhonchi scattered bilateral  Cardiovascular: normal rate, normal S1 and S2 and no carotid bruits  Abdomen: soft, non-tender, non-distended, normal bowel sounds, no masses or organomegaly  Extremities: no cyanosis, no clubbing and no  edema  Neurologic: no cranial nerve deficit and speech normal      Recent Labs     02/03/22  0826 02/04/22  0521 02/05/22  0612    139 142   K 3.2* 4.2 4.0    103 105   CO2 24 26 28   BUN 12 16 21   CREATININE 0.5 0.6 0.5   GLUCOSE 103* 103* 93   CALCIUM 9.7 10.0 9.8       Recent Labs     02/03/22  0826 02/04/22  0521 02/05/22  0612   ALKPHOS 70 79 71   PROT 6.8 6.9 6.6   LABALBU 3.6 3.7 3.4*   BILITOT 0.3 0.2 0.2   AST 15 16 11   ALT 13 14 14       Recent Labs     02/02/22  1143 02/03/22  0826   WBC 10.6 12.4*   RBC 4.30 4.03   HGB 12.9 12.2   HCT 40.3 37.7   MCV 93.7 93.5   MCH 30.0 30.3   MCHC 32.0 32.4   RDW 13.1 13.1    265   MPV 10.8 11.0           Radiology:   CTA CHEST W CONTRAST   Final Result   1. No CT evidence of pulmonary embolism. 2.  Mild upper lobe centrilobular emphysema. 3.  Mild lower lobe bronchiectasis with soft tissue density within multiple   lower lobe bronchi posteriorly most compatible with mucous plugging. Focal   area of consolidation in the right lower lobe suggestive of pneumonia. Probable reactive hilar adenopathy bilaterally. 4.  7 mm noncalcified soft tissue pulmonary nodule in the peripheral left   lower lobe. RECOMMENDATIONS:   Follow-up noncontrast CT chest in 3-6 months recommended. Fleischner Society guidelines for follow-up and management of incidentally   detected pulmonary nodules:      Multiple Solid Nodules:      ~Nodule size equals 6-8 mm. In a low-risk patient, CT at 3-6 months, then   consider CT at 18-24 months. In a high-risk patient, CT at 3-6 months, then   CT at 18-24 months. Radiology 2017 http://pubs. rsna.org/doi/full/10.1148/radiol. 0961978738         XR CHEST PORTABLE   Final Result   No acute process. Assessment:  Active Problems:    Acute hypoxemic respiratory failure due to COVID-19 [facility])  Resolved Problems:    * No resolved hospital problems.  *      Plan: History of present illness from History and Physical:  73 y.o. female with a history of CAD (patient apparently had cath with Dr. Oscar Lyon in 2002 however records can not be found), depression/anxiety and suboxone use (prescribed) presents with SOB. The patient states that she became more SOB at home a couple days prior to admission but used her PRN oxygen she had at home at around 3L and felt better. She denied any other symptoms or complaints. She states that she was at a lab office getting blood work and they sent her to the ER because her oxygen level was low. She denies any cough (other than her usual \"smoker's cough\", fever/chills, N/V/D or any other complaints.        1. Acute on chronic respiratory failure due to's COVID-19 pneumonia's. CTA showed mild lower lobe bronchiectasis and pulmonary nodule in the left lower lobe. Continuing remdesivir, Solu-Medrol 40 each day. on duo nebs incentive spirometry  , encourage proning. Procalcitonin low at 0.04. Legionella and strep pneumonia antigen negative  2. Hypo-kalemia supplemented on 2/3 potassium is within normal limits today  3. Mucus plugging on Mucomyst and flutter valve monitor O2 need. 4. Pulmonary nodule and smoker needs follow-up CT scan as an outpatient.,  In 3 to 4 months and discussed with patient in details  5. Tobacco abuse counseled not to smoke. We will give her tobacco cessation counseling, she refused it and stated she knows she needs to quit and she won't smoke again   6. 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 Mood disorder continuing home dose of BuSpar 5 twice daily and Remeron 30 nightly. 8. Coronary artery disease seemingly noncompliant to stent 10 and aspirin but has had stent placed 10 years ago records from 2002 are available she will need follow-up. As outpatient with [facility] cardiology  9. Full code  10. VTE prophylaxis Lovenox 30 twice daily  11. Disposition Home when improved. 12. Patient seems to be really recovering well.   Possible discharge tomorrow , will probably need home oxygen, will evaluate, encouraged ambulation NOTE: This report was transcribed using voice recognition software. Every effort was made to ensure accuracy; however, inadvertent computerized transcription errors may be present.      Electronically signed by Sonia Sanford MD on 2/5/2022 at 10:40 AM

## 2022-02-06 LAB
ALBUMIN SERPL-MCNC: 3.8 G/DL (ref 3.5–5.2)
ALP BLD-CCNC: 98 U/L (ref 35–104)
ALT SERPL-CCNC: 17 U/L (ref 0–32)
ANION GAP SERPL CALCULATED.3IONS-SCNC: 12 MMOL/L (ref 7–16)
AST SERPL-CCNC: 15 U/L (ref 0–31)
BASOPHILS ABSOLUTE: 0.02 E9/L (ref 0–0.2)
BASOPHILS RELATIVE PERCENT: 0.1 % (ref 0–2)
BILIRUB SERPL-MCNC: 0.3 MG/DL (ref 0–1.2)
BUN BLDV-MCNC: 21 MG/DL (ref 6–23)
CALCIUM SERPL-MCNC: 10.5 MG/DL (ref 8.6–10.2)
CHLORIDE BLD-SCNC: 102 MMOL/L (ref 98–107)
CO2: 27 MMOL/L (ref 22–29)
CREAT SERPL-MCNC: 0.5 MG/DL (ref 0.5–1)
EOSINOPHILS ABSOLUTE: 0.01 E9/L (ref 0.05–0.5)
EOSINOPHILS RELATIVE PERCENT: 0.1 % (ref 0–6)
GFR AFRICAN AMERICAN: >60
GFR NON-AFRICAN AMERICAN: >60 ML/MIN/1.73
GLUCOSE BLD-MCNC: 94 MG/DL (ref 74–99)
HCT VFR BLD CALC: 45.9 % (ref 34–48)
HEMOGLOBIN: 14.5 G/DL (ref 11.5–15.5)
IMMATURE GRANULOCYTES #: 0.09 E9/L
IMMATURE GRANULOCYTES %: 0.6 % (ref 0–5)
LYMPHOCYTES ABSOLUTE: 1.45 E9/L (ref 1.5–4)
LYMPHOCYTES RELATIVE PERCENT: 9.6 % (ref 20–42)
MCH RBC QN AUTO: 30.2 PG (ref 26–35)
MCHC RBC AUTO-ENTMCNC: 31.6 % (ref 32–34.5)
MCV RBC AUTO: 95.6 FL (ref 80–99.9)
MONOCYTES ABSOLUTE: 0.94 E9/L (ref 0.1–0.95)
MONOCYTES RELATIVE PERCENT: 6.2 % (ref 2–12)
NEUTROPHILS ABSOLUTE: 12.56 E9/L (ref 1.8–7.3)
NEUTROPHILS RELATIVE PERCENT: 83.4 % (ref 43–80)
PDW BLD-RTO: 13.4 FL (ref 11.5–15)
PLATELET # BLD: 367 E9/L (ref 130–450)
PMV BLD AUTO: 10 FL (ref 7–12)
POTASSIUM SERPL-SCNC: 4 MMOL/L (ref 3.5–5)
RBC # BLD: 4.8 E12/L (ref 3.5–5.5)
SODIUM BLD-SCNC: 141 MMOL/L (ref 132–146)
TOTAL PROTEIN: 7.4 G/DL (ref 6.4–8.3)
WBC # BLD: 15.1 E9/L (ref 4.5–11.5)

## 2022-02-06 PROCEDURE — 80053 COMPREHEN METABOLIC PANEL: CPT

## 2022-02-06 PROCEDURE — 6360000002 HC RX W HCPCS: Performed by: INTERNAL MEDICINE

## 2022-02-06 PROCEDURE — 6360000002 HC RX W HCPCS: Performed by: STUDENT IN AN ORGANIZED HEALTH CARE EDUCATION/TRAINING PROGRAM

## 2022-02-06 PROCEDURE — 2500000003 HC RX 250 WO HCPCS: Performed by: INTERNAL MEDICINE

## 2022-02-06 PROCEDURE — 2580000003 HC RX 258: Performed by: INTERNAL MEDICINE

## 2022-02-06 PROCEDURE — 2580000003 HC RX 258: Performed by: STUDENT IN AN ORGANIZED HEALTH CARE EDUCATION/TRAINING PROGRAM

## 2022-02-06 PROCEDURE — 2700000000 HC OXYGEN THERAPY PER DAY

## 2022-02-06 PROCEDURE — 6370000000 HC RX 637 (ALT 250 FOR IP): Performed by: STUDENT IN AN ORGANIZED HEALTH CARE EDUCATION/TRAINING PROGRAM

## 2022-02-06 PROCEDURE — 99232 SBSQ HOSP IP/OBS MODERATE 35: CPT | Performed by: INTERNAL MEDICINE

## 2022-02-06 PROCEDURE — 85025 COMPLETE CBC W/AUTO DIFF WBC: CPT

## 2022-02-06 PROCEDURE — 36415 COLL VENOUS BLD VENIPUNCTURE: CPT

## 2022-02-06 PROCEDURE — 94669 MECHANICAL CHEST WALL OSCILL: CPT

## 2022-02-06 PROCEDURE — 94640 AIRWAY INHALATION TREATMENT: CPT

## 2022-02-06 PROCEDURE — 2060000000 HC ICU INTERMEDIATE R&B

## 2022-02-06 RX ADMIN — IPRATROPIUM BROMIDE AND ALBUTEROL SULFATE 1 AMPULE: .5; 2.5 SOLUTION RESPIRATORY (INHALATION) at 06:26

## 2022-02-06 RX ADMIN — GUAIFENESIN 1200 MG: 600 TABLET, EXTENDED RELEASE ORAL at 08:13

## 2022-02-06 RX ADMIN — PYRIDOXINE HCL TAB 50 MG 50 MG: 50 TAB at 08:14

## 2022-02-06 RX ADMIN — REMDESIVIR 100 MG: 100 INJECTION, POWDER, LYOPHILIZED, FOR SOLUTION INTRAVENOUS at 13:29

## 2022-02-06 RX ADMIN — ACETAMINOPHEN 650 MG: 325 TABLET ORAL at 20:41

## 2022-02-06 RX ADMIN — ACETAMINOPHEN 650 MG: 325 TABLET ORAL at 08:13

## 2022-02-06 RX ADMIN — Medication 250 MG: at 08:13

## 2022-02-06 RX ADMIN — BUSPIRONE HYDROCHLORIDE 5 MG: 5 TABLET ORAL at 08:14

## 2022-02-06 RX ADMIN — BUPRENORPHINE HYDROCHLORIDE AND NALOXONE HYDROCHLORIDE DIHYDRATE 1 TABLET: 8; 2 TABLET SUBLINGUAL at 20:42

## 2022-02-06 RX ADMIN — METHYLPREDNISOLONE SODIUM SUCCINATE 40 MG: 40 INJECTION, POWDER, FOR SOLUTION INTRAMUSCULAR; INTRAVENOUS at 08:14

## 2022-02-06 RX ADMIN — GUAIFENESIN 1200 MG: 600 TABLET, EXTENDED RELEASE ORAL at 20:42

## 2022-02-06 RX ADMIN — ENOXAPARIN SODIUM 30 MG: 100 INJECTION SUBCUTANEOUS at 08:15

## 2022-02-06 RX ADMIN — ZINC SULFATE 220 MG (50 MG) CAPSULE 50 MG: CAPSULE at 08:13

## 2022-02-06 RX ADMIN — Medication 2000 UNITS: at 08:14

## 2022-02-06 RX ADMIN — IPRATROPIUM BROMIDE AND ALBUTEROL SULFATE 1 AMPULE: .5; 2.5 SOLUTION RESPIRATORY (INHALATION) at 18:35

## 2022-02-06 RX ADMIN — ACETYLCYSTEINE 400 MG: 100 SOLUTION ORAL; RESPIRATORY (INHALATION) at 18:35

## 2022-02-06 RX ADMIN — ENOXAPARIN SODIUM 30 MG: 100 INJECTION SUBCUTANEOUS at 20:42

## 2022-02-06 RX ADMIN — BUPRENORPHINE HYDROCHLORIDE AND NALOXONE HYDROCHLORIDE DIHYDRATE 1 TABLET: 8; 2 TABLET SUBLINGUAL at 08:15

## 2022-02-06 RX ADMIN — Medication 10 ML: at 08:16

## 2022-02-06 RX ADMIN — BUSPIRONE HYDROCHLORIDE 5 MG: 5 TABLET ORAL at 20:42

## 2022-02-06 RX ADMIN — IPRATROPIUM BROMIDE AND ALBUTEROL SULFATE 1 AMPULE: .5; 2.5 SOLUTION RESPIRATORY (INHALATION) at 10:00

## 2022-02-06 RX ADMIN — MIRTAZAPINE 30 MG: 15 TABLET, FILM COATED ORAL at 20:41

## 2022-02-06 RX ADMIN — ONDANSETRON 4 MG: 4 TABLET, ORALLY DISINTEGRATING ORAL at 05:44

## 2022-02-06 RX ADMIN — Medication 10 ML: at 20:42

## 2022-02-06 RX ADMIN — ACETYLCYSTEINE 400 MG: 100 SOLUTION ORAL; RESPIRATORY (INHALATION) at 06:25

## 2022-02-06 RX ADMIN — IPRATROPIUM BROMIDE AND ALBUTEROL SULFATE 1 AMPULE: .5; 2.5 SOLUTION RESPIRATORY (INHALATION) at 14:05

## 2022-02-06 RX ADMIN — BUPRENORPHINE HYDROCHLORIDE AND NALOXONE HYDROCHLORIDE DIHYDRATE 1 TABLET: 8; 2 TABLET SUBLINGUAL at 14:00

## 2022-02-06 ASSESSMENT — PAIN SCALES - GENERAL
PAINLEVEL_OUTOF10: 0
PAINLEVEL_OUTOF10: 0
PAINLEVEL_OUTOF10: 2
PAINLEVEL_OUTOF10: 5

## 2022-02-06 ASSESSMENT — PAIN DESCRIPTION - PAIN TYPE: TYPE: ACUTE PAIN

## 2022-02-06 ASSESSMENT — PAIN DESCRIPTION - LOCATION: LOCATION: HEAD

## 2022-02-06 NOTE — PROGRESS NOTES
Patient in side lying position and tolerating well. Refused proning, but is increasing use of I.S. Call light is in reach.  Denies needs at this time

## 2022-02-06 NOTE — PROGRESS NOTES
Sitting up in bed eating breakfast. Patient ate cereal, but states that she doesn't want anything further. Medications taken without difficulty with water. C/o headache, and tylenol is given per PRN order. See shift assessment.

## 2022-02-06 NOTE — PROGRESS NOTES
Admitting Date and Time: 2/2/2022 11:15 AM  Admit Dx: Acute hypoxemic respiratory failure due to COVID-19 (HCC) [U07.1, J96.01]    Subjective:  Marginally improved breathing   ROS: denies fever, chills, cp, n/v, HA unless stated above.      [START ON 2/7/2022] acetylcysteine  4 mL Inhalation BID    remdesivir IVPB  100 mg IntraVENous Q24H    sodium chloride flush  5-40 mL IntraVENous 2 times per day    enoxaparin  30 mg SubCUTAneous BID    Vitamin D  2,000 Units Oral Daily    zinc sulfate  50 mg Oral Daily    vitamin B-6  50 mg Oral Daily    vitamin C  250 mg Oral Daily    ipratropium-albuterol  1 ampule Inhalation Q4H WA    busPIRone  5 mg Oral BID    guaiFENesin  1,200 mg Oral BID    mirtazapine  30 mg Oral Nightly    buprenorphine-naloxone  1 tablet SubLINGual TID    methylPREDNISolone  40 mg IntraVENous Daily     sodium chloride, 30 mL, PRN  sodium chloride flush, 5-40 mL, PRN  sodium chloride, 25 mL, PRN  ondansetron, 4 mg, Q8H PRN   Or  ondansetron, 4 mg, Q6H PRN  polyethylene glycol, 17 g, Daily PRN  acetaminophen, 650 mg, Q6H PRN   Or  acetaminophen, 650 mg, Q6H PRN         Objective:    BP (!) 159/70   Pulse 100   Temp 97.1 °F (36.2 °C) (Infrared)   Resp 18   Ht 5' 5\" (1.651 m)   Wt 115 lb 11.9 oz (52.5 kg)   SpO2 92%   BMI 19.26 kg/m²   General Appearance: alert and oriented to person, place and time and in no acute distress  Skin: warm and dry  Head: normocephalic and atraumatic  Eyes: pupils equal, round, and reactive to light, extraocular eye movements intact, conjunctivae normal  Neck: neck supple and non tender without mass   Pulmonary/Chest: clear to auscultation bilaterally- no wheezes, rales or rhonchi, normal air movement, no respiratory distress  Cardiovascular: normal rate, normal S1 and S2 and no carotid bruits  Abdomen: soft, non-tender, non-distended, normal bowel sounds, no masses or organomegaly  Extremities: no cyanosis, no clubbing and no  edema  Neurologic: no cranial nerve deficit and speech normal      Recent Labs     02/04/22  0521 02/05/22  0612 02/06/22  0702    142 141   K 4.2 4.0 4.0    105 102   CO2 26 28 27   BUN 16 21 21   CREATININE 0.6 0.5 0.5   GLUCOSE 103* 93 94   CALCIUM 10.0 9.8 10.5*       Recent Labs     02/04/22  0521 02/05/22  0612 02/06/22  0702   ALKPHOS 79 71 98   PROT 6.9 6.6 7.4   LABALBU 3.7 3.4* 3.8   BILITOT 0.2 0.2 0.3   AST 16 11 15   ALT 14 14 17       Recent Labs     02/06/22  0702   WBC 15.1*   RBC 4.80   HGB 14.5   HCT 45.9   MCV 95.6   MCH 30.2   MCHC 31.6*   RDW 13.4      MPV 10.0           Radiology:   CTA CHEST W CONTRAST   Final Result   1. No CT evidence of pulmonary embolism. 2.  Mild upper lobe centrilobular emphysema. 3.  Mild lower lobe bronchiectasis with soft tissue density within multiple   lower lobe bronchi posteriorly most compatible with mucous plugging. Focal   area of consolidation in the right lower lobe suggestive of pneumonia. Probable reactive hilar adenopathy bilaterally. 4.  7 mm noncalcified soft tissue pulmonary nodule in the peripheral left   lower lobe. RECOMMENDATIONS:   Follow-up noncontrast CT chest in 3-6 months recommended. Fleischner Society guidelines for follow-up and management of incidentally   detected pulmonary nodules:      Multiple Solid Nodules:      ~Nodule size equals 6-8 mm. In a low-risk patient, CT at 3-6 months, then   consider CT at 18-24 months. In a high-risk patient, CT at 3-6 months, then   CT at 18-24 months. Radiology 2017 http://pubs. rsna.org/doi/full/10.1148/radiol. 2708063038         XR CHEST PORTABLE   Final Result   No acute process. Assessment:  Active Problems:    Acute hypoxemic respiratory failure due to COVID-19 [facility])  Resolved Problems:    * No resolved hospital problems.  *      Plan: History of present illness from History and Physical:  73 y.o. female with a history of CAD (patient apparently had cath with  Kev Joshi in 2002 however records can not be found), depression/anxiety and suboxone use (prescribed) presents with SOB. The patient states that she became more SOB at home a couple days ago but used her PRN oxygen she had at home at around 3L and felt better. She denied any other symptoms or complaints. She states that she was at a lab office getting blood work and they sent her to the ER because her oxygen level was low. She denies any cough (other than her usual \"smoker's cough\", fever/chills, N/V/D or any other complaints.        1. Acute on chronic respiratory failure due to's COVID-19 pneumonia's. CTA showed mild lower lobe bronchiectasis and pulmonary nodule in the left lower lobe. Continuing remdesivir, Solu-Medrol 40 each day duo nebs incentive spirometry flutter valve encourage proning. Procalcitonin low at 0.04. Legionella and strep pneumonia antigen negative  2. Hypo-kalemia supplemented on 2/3.2/6: normal  3. Mucus plugging on Mucomyst and flutter valve monitor O2 need. 4. Pulmonary nodule and smoker needs follow-up CT scan as an outpatient.,  In 3 to 4 months. Patient already informed  5. Tobacco abuse counseled not to smoke. We will give her tobacco cessation counseling  6. Narcotic misuse syndrome on Suboxone from [address] [place] [address] Street 3 times daily on home dose now  7. Mood disorder continuing home dose of BuSpar 5 twice daily and Remeron 30 nightly  8. Coronary artery disease seemingly noncompliant to stent 10 and aspirin but has had stent placed 10 years ago records from 2002 are available she will need follow-up. As outpatient with [facility] cardiology  9. Full code  10. VTE prophylaxis Lovenox 30 twice daily  11. Disposition Home when on less O2. NOTE: This report was transcribed using voice recognition software. Every effort was made to ensure accuracy; however, inadvertent computerized transcription errors may be present.      Electronically signed by Yemi Reeves MD on 2/6/2022 at 12:42 PM

## 2022-02-06 NOTE — PROGRESS NOTES
Sitting up in bed watching TV. Encouraged patient to do I. S. while sitting up, and to lay on her stomach when she is able. Will assist with proning after completion of Remdesivir. Patient is agreeable to POC.

## 2022-02-07 LAB
ALBUMIN SERPL-MCNC: 3.1 G/DL (ref 3.5–5.2)
ALP BLD-CCNC: 78 U/L (ref 35–104)
ALT SERPL-CCNC: 14 U/L (ref 0–32)
ANION GAP SERPL CALCULATED.3IONS-SCNC: 9 MMOL/L (ref 7–16)
AST SERPL-CCNC: 11 U/L (ref 0–31)
BILIRUB SERPL-MCNC: 0.3 MG/DL (ref 0–1.2)
BUN BLDV-MCNC: 20 MG/DL (ref 6–23)
CALCIUM SERPL-MCNC: 9.9 MG/DL (ref 8.6–10.2)
CHLORIDE BLD-SCNC: 100 MMOL/L (ref 98–107)
CO2: 28 MMOL/L (ref 22–29)
CREAT SERPL-MCNC: 0.4 MG/DL (ref 0.5–1)
GFR AFRICAN AMERICAN: >60
GFR NON-AFRICAN AMERICAN: >60 ML/MIN/1.73
GLUCOSE BLD-MCNC: 88 MG/DL (ref 74–99)
POTASSIUM SERPL-SCNC: 3.8 MMOL/L (ref 3.5–5)
SODIUM BLD-SCNC: 137 MMOL/L (ref 132–146)
TOTAL PROTEIN: 6.6 G/DL (ref 6.4–8.3)

## 2022-02-07 PROCEDURE — 80053 COMPREHEN METABOLIC PANEL: CPT

## 2022-02-07 PROCEDURE — 36415 COLL VENOUS BLD VENIPUNCTURE: CPT

## 2022-02-07 PROCEDURE — 2500000003 HC RX 250 WO HCPCS: Performed by: INTERNAL MEDICINE

## 2022-02-07 PROCEDURE — 99232 SBSQ HOSP IP/OBS MODERATE 35: CPT | Performed by: INTERNAL MEDICINE

## 2022-02-07 PROCEDURE — 2580000003 HC RX 258: Performed by: INTERNAL MEDICINE

## 2022-02-07 PROCEDURE — 2060000000 HC ICU INTERMEDIATE R&B

## 2022-02-07 PROCEDURE — 94640 AIRWAY INHALATION TREATMENT: CPT

## 2022-02-07 PROCEDURE — 2700000000 HC OXYGEN THERAPY PER DAY

## 2022-02-07 PROCEDURE — 6370000000 HC RX 637 (ALT 250 FOR IP): Performed by: STUDENT IN AN ORGANIZED HEALTH CARE EDUCATION/TRAINING PROGRAM

## 2022-02-07 PROCEDURE — 94669 MECHANICAL CHEST WALL OSCILL: CPT

## 2022-02-07 PROCEDURE — 6360000002 HC RX W HCPCS: Performed by: STUDENT IN AN ORGANIZED HEALTH CARE EDUCATION/TRAINING PROGRAM

## 2022-02-07 PROCEDURE — 2580000003 HC RX 258: Performed by: STUDENT IN AN ORGANIZED HEALTH CARE EDUCATION/TRAINING PROGRAM

## 2022-02-07 RX ADMIN — Medication 2000 UNITS: at 08:48

## 2022-02-07 RX ADMIN — ENOXAPARIN SODIUM 30 MG: 100 INJECTION SUBCUTANEOUS at 19:29

## 2022-02-07 RX ADMIN — IPRATROPIUM BROMIDE AND ALBUTEROL SULFATE 1 AMPULE: .5; 2.5 SOLUTION RESPIRATORY (INHALATION) at 07:00

## 2022-02-07 RX ADMIN — BUPRENORPHINE HYDROCHLORIDE AND NALOXONE HYDROCHLORIDE DIHYDRATE 1 TABLET: 8; 2 TABLET SUBLINGUAL at 14:12

## 2022-02-07 RX ADMIN — GUAIFENESIN 1200 MG: 600 TABLET, EXTENDED RELEASE ORAL at 19:29

## 2022-02-07 RX ADMIN — GUAIFENESIN 1200 MG: 600 TABLET, EXTENDED RELEASE ORAL at 08:48

## 2022-02-07 RX ADMIN — Medication 10 ML: at 08:50

## 2022-02-07 RX ADMIN — METHYLPREDNISOLONE SODIUM SUCCINATE 40 MG: 40 INJECTION, POWDER, FOR SOLUTION INTRAMUSCULAR; INTRAVENOUS at 08:49

## 2022-02-07 RX ADMIN — MIRTAZAPINE 30 MG: 15 TABLET, FILM COATED ORAL at 19:29

## 2022-02-07 RX ADMIN — ACETAMINOPHEN 650 MG: 325 TABLET ORAL at 06:40

## 2022-02-07 RX ADMIN — ENOXAPARIN SODIUM 30 MG: 100 INJECTION SUBCUTANEOUS at 08:49

## 2022-02-07 RX ADMIN — BUPRENORPHINE HYDROCHLORIDE AND NALOXONE HYDROCHLORIDE DIHYDRATE 1 TABLET: 8; 2 TABLET SUBLINGUAL at 08:49

## 2022-02-07 RX ADMIN — ZINC SULFATE 220 MG (50 MG) CAPSULE 50 MG: CAPSULE at 08:49

## 2022-02-07 RX ADMIN — BUSPIRONE HYDROCHLORIDE 5 MG: 5 TABLET ORAL at 19:30

## 2022-02-07 RX ADMIN — PYRIDOXINE HCL TAB 50 MG 50 MG: 50 TAB at 08:49

## 2022-02-07 RX ADMIN — IPRATROPIUM BROMIDE AND ALBUTEROL SULFATE 1 AMPULE: .5; 2.5 SOLUTION RESPIRATORY (INHALATION) at 18:51

## 2022-02-07 RX ADMIN — IPRATROPIUM BROMIDE AND ALBUTEROL SULFATE 1 AMPULE: .5; 2.5 SOLUTION RESPIRATORY (INHALATION) at 13:03

## 2022-02-07 RX ADMIN — Medication 250 MG: at 08:49

## 2022-02-07 RX ADMIN — BUSPIRONE HYDROCHLORIDE 5 MG: 5 TABLET ORAL at 08:49

## 2022-02-07 RX ADMIN — ACETYLCYSTEINE 400 MG: 100 SOLUTION ORAL; RESPIRATORY (INHALATION) at 18:51

## 2022-02-07 RX ADMIN — IPRATROPIUM BROMIDE AND ALBUTEROL SULFATE 1 AMPULE: .5; 2.5 SOLUTION RESPIRATORY (INHALATION) at 10:06

## 2022-02-07 RX ADMIN — ACETYLCYSTEINE 400 MG: 100 SOLUTION ORAL; RESPIRATORY (INHALATION) at 07:01

## 2022-02-07 RX ADMIN — Medication 10 ML: at 19:30

## 2022-02-07 RX ADMIN — ACETAMINOPHEN 650 MG: 325 TABLET ORAL at 19:30

## 2022-02-07 RX ADMIN — REMDESIVIR 100 MG: 100 INJECTION, POWDER, LYOPHILIZED, FOR SOLUTION INTRAVENOUS at 14:12

## 2022-02-07 RX ADMIN — BUPRENORPHINE HYDROCHLORIDE AND NALOXONE HYDROCHLORIDE DIHYDRATE 1 TABLET: 8; 2 TABLET SUBLINGUAL at 19:29

## 2022-02-07 ASSESSMENT — PAIN SCALES - GENERAL
PAINLEVEL_OUTOF10: 4
PAINLEVEL_OUTOF10: 0
PAINLEVEL_OUTOF10: 4
PAINLEVEL_OUTOF10: 9
PAINLEVEL_OUTOF10: 0
PAINLEVEL_OUTOF10: 3

## 2022-02-07 ASSESSMENT — PAIN DESCRIPTION - LOCATION: LOCATION: HEAD

## 2022-02-07 NOTE — PROGRESS NOTES
Patient is resting in bed this am. Easily transferred per self to bedside chair and tolerated well. Patient is on 4L high flow NC. Patient states that headache is improving after tylenol administration earlier this date.

## 2022-02-07 NOTE — CARE COORDINATION
SOCIAL WORK / DISCHARGE PLANNING:  COVID positive 2/2. Sw spoke with pt via phone. Dc plan remains to return home with granddtr as previous. Pt reports to be ambulating in room, no assistive device used. She has home O2 from [facility] but after discussion she does think she has any portable O2 for transport. Sw will call [facility] about this in the am. Pt may need new testing and script if change in Liter flow is noted. Pt denies need for HHC. Receives Suboxone treatment q 2 weeks from [facility]. Angelina Crystal will provide home going transport.                  Electronically signed by NERY Rebolledo on 2/7/2022 at 5:15 PM

## 2022-02-07 NOTE — PROGRESS NOTES
Department of Internal Medicine  General Internal Medicine  Attending Progress Note  Chief Complaint   Patient presents with    Shortness of Breath     Pt being seen at [facility], sob started this AM, hypoxic 84% on RA, rhonchi, wears O2 at home prn    Cough     SUBJECTIVE:    Reports that she is feeling better. Noted that cough is improving and she is coughing up some phlegm. No chest pain. Aware of plans to continue remdesivir and decadron and continue to wean her off oxygen as tolerated.      OBJECTIVE      Medications    Current Facility-Administered Medications: acetylcysteine (MUCOMYST) 10 % solution 400 mg, 4 mL, Inhalation, BID  0.9 % sodium chloride bolus, 30 mL, IntraVENous, PRN  sodium chloride flush 0.9 % injection 5-40 mL, 5-40 mL, IntraVENous, 2 times per day  sodium chloride flush 0.9 % injection 5-40 mL, 5-40 mL, IntraVENous, PRN  0.9 % sodium chloride infusion, 25 mL, IntraVENous, PRN  enoxaparin (LOVENOX) injection 30 mg, 30 mg, SubCUTAneous, BID  ondansetron (ZOFRAN-ODT) disintegrating tablet 4 mg, 4 mg, Oral, Q8H PRN **OR** ondansetron (ZOFRAN) injection 4 mg, 4 mg, IntraVENous, Q6H PRN  polyethylene glycol (GLYCOLAX) packet 17 g, 17 g, Oral, Daily PRN  acetaminophen (TYLENOL) tablet 650 mg, 650 mg, Oral, Q6H PRN **OR** acetaminophen (TYLENOL) suppository 650 mg, 650 mg, Rectal, Q6H PRN  Vitamin D (CHOLECALCIFEROL) tablet 2,000 Units, 2,000 Units, Oral, Daily  zinc sulfate (ZINCATE) capsule 50 mg, 50 mg, Oral, Daily  vitamin B-6 (PYRIDOXINE) tablet 50 mg, 50 mg, Oral, Daily  vitamin C tablet 250 mg, 250 mg, Oral, Daily  ipratropium-albuterol (DUONEB) nebulizer solution 1 ampule, 1 ampule, Inhalation, Q4H WA  busPIRone (BUSPAR) tablet 5 mg, 5 mg, Oral, BID  guaiFENesin (MUCINEX) extended release tablet 1,200 mg, 1,200 mg, Oral, BID  mirtazapine (REMERON) tablet 30 mg, 30 mg, Oral, Nightly  buprenorphine-naloxone (SUBOXONE) 8-2 MG SL tablet 1 tablet, 1 tablet, SubLINGual, TID  methylPREDNISolone sodium (SOLU-MEDROL) injection 40 mg, 40 mg, IntraVENous, Daily  Physical    VITALS:  BP (!) 102/52   Pulse 85   Temp 98 °F (36.7 °C) (Infrared)   Resp 18   Ht 5' 5\" (1.651 m)   Wt 115 lb 11.9 oz (52.5 kg)   SpO2 92%   BMI 19.26 kg/m²   CONSTITUTIONAL:  awake  EYES:  extra-ocular muscles intact and vision intact  ENT:  atraumatic  NECK:  skin normal  BACK:  symmetric  LUNGS:  no increased work of breathing and rhonchi diffuse  CARDIOVASCULAR:  normal apical pulses and normal S1 and S2  ABDOMEN:  normal bowel sounds, non-distended and non-tender  MUSCULOSKELETAL:  there is no redness, warmth, or swelling of the joints  NEUROLOGIC:  Mental Status Exam:  Level of Alertness:   awake  Orientation:   person, place, time  Motor Exam:  Motor exam is symmetrical 5 out of 5 all extremities bilaterally  SKIN:  no bruising or bleeding  Data    CBC:   Lab Results   Component Value Date    WBC 15.1 02/06/2022    RBC 4.80 02/06/2022    HGB 14.5 02/06/2022    HCT 45.9 02/06/2022    MCV 95.6 02/06/2022    MCH 30.2 02/06/2022    MCHC 31.6 02/06/2022    RDW 13.4 02/06/2022     02/06/2022    MPV 10.0 02/06/2022     BMP:    Lab Results   Component Value Date     02/07/2022    K 3.8 02/07/2022    K 3.2 02/03/2022     02/07/2022    CO2 28 02/07/2022    BUN 20 02/07/2022    LABALBU 3.1 02/07/2022    LABALBU 3.9 04/03/2012    CREATININE 0.4 02/07/2022    CALCIUM 9.9 02/07/2022    GFRAA >60 02/07/2022    LABGLOM >60 02/07/2022    GLUCOSE 88 02/07/2022    GLUCOSE 116 04/03/2012       ASSESSMENT AND PLAN      1. Acute hypoxemic respiratory failure due to COVID-19 [facility])  Continue supplemental oxygen  Wean off as tolerated  Continue to monitor  Continue decadron and remdesivir  acapella and incentive spirometry     2. Tobacco abuse: patient noted having not smoked since admission and believes she will be fine. Nicotine patch if needed    3. Hypokalemia: resolved    4.   CAD: secondary prophylaxis: follow up with cadiology as outpatient,    5. Pulm Nodule: out patient work up in 3-6 months. 6.  Opioid Dependency: continue Suboxone per prescription.      DVT prophylaxis

## 2022-02-08 PROCEDURE — 6370000000 HC RX 637 (ALT 250 FOR IP): Performed by: FAMILY MEDICINE

## 2022-02-08 PROCEDURE — 6370000000 HC RX 637 (ALT 250 FOR IP): Performed by: STUDENT IN AN ORGANIZED HEALTH CARE EDUCATION/TRAINING PROGRAM

## 2022-02-08 PROCEDURE — 6360000002 HC RX W HCPCS: Performed by: FAMILY MEDICINE

## 2022-02-08 PROCEDURE — 99232 SBSQ HOSP IP/OBS MODERATE 35: CPT | Performed by: INTERNAL MEDICINE

## 2022-02-08 PROCEDURE — 94640 AIRWAY INHALATION TREATMENT: CPT

## 2022-02-08 PROCEDURE — 94669 MECHANICAL CHEST WALL OSCILL: CPT

## 2022-02-08 PROCEDURE — 6360000002 HC RX W HCPCS: Performed by: STUDENT IN AN ORGANIZED HEALTH CARE EDUCATION/TRAINING PROGRAM

## 2022-02-08 PROCEDURE — 2580000003 HC RX 258: Performed by: STUDENT IN AN ORGANIZED HEALTH CARE EDUCATION/TRAINING PROGRAM

## 2022-02-08 PROCEDURE — 2060000000 HC ICU INTERMEDIATE R&B

## 2022-02-08 PROCEDURE — 2700000000 HC OXYGEN THERAPY PER DAY

## 2022-02-08 PROCEDURE — 2580000003 HC RX 258: Performed by: FAMILY MEDICINE

## 2022-02-08 RX ORDER — 0.9 % SODIUM CHLORIDE 0.9 %
500 INTRAVENOUS SOLUTION INTRAVENOUS ONCE
Status: COMPLETED | OUTPATIENT
Start: 2022-02-08 | End: 2022-02-08

## 2022-02-08 RX ORDER — FLUTICASONE PROPIONATE 50 MCG
2 SPRAY, SUSPENSION (ML) NASAL DAILY
Status: DISCONTINUED | OUTPATIENT
Start: 2022-02-08 | End: 2022-02-15 | Stop reason: HOSPADM

## 2022-02-08 RX ORDER — METOCLOPRAMIDE HYDROCHLORIDE 5 MG/ML
10 INJECTION INTRAMUSCULAR; INTRAVENOUS ONCE
Status: COMPLETED | OUTPATIENT
Start: 2022-02-08 | End: 2022-02-08

## 2022-02-08 RX ORDER — MECOBALAMIN 5000 MCG
10 TABLET,DISINTEGRATING ORAL NIGHTLY
Status: DISCONTINUED | OUTPATIENT
Start: 2022-02-08 | End: 2022-02-15 | Stop reason: HOSPADM

## 2022-02-08 RX ORDER — KETOROLAC TROMETHAMINE 30 MG/ML
30 INJECTION, SOLUTION INTRAMUSCULAR; INTRAVENOUS ONCE
Status: COMPLETED | OUTPATIENT
Start: 2022-02-08 | End: 2022-02-08

## 2022-02-08 RX ORDER — DIPHENHYDRAMINE HYDROCHLORIDE 50 MG/ML
25 INJECTION INTRAMUSCULAR; INTRAVENOUS ONCE
Status: COMPLETED | OUTPATIENT
Start: 2022-02-08 | End: 2022-02-08

## 2022-02-08 RX ADMIN — METHYLPREDNISOLONE SODIUM SUCCINATE 40 MG: 40 INJECTION, POWDER, FOR SOLUTION INTRAMUSCULAR; INTRAVENOUS at 09:00

## 2022-02-08 RX ADMIN — METOCLOPRAMIDE HYDROCHLORIDE 10 MG: 5 INJECTION INTRAMUSCULAR; INTRAVENOUS at 20:51

## 2022-02-08 RX ADMIN — BUSPIRONE HYDROCHLORIDE 5 MG: 5 TABLET ORAL at 09:00

## 2022-02-08 RX ADMIN — IPRATROPIUM BROMIDE AND ALBUTEROL SULFATE 1 AMPULE: .5; 2.5 SOLUTION RESPIRATORY (INHALATION) at 09:55

## 2022-02-08 RX ADMIN — ONDANSETRON 4 MG: 2 INJECTION INTRAMUSCULAR; INTRAVENOUS at 21:11

## 2022-02-08 RX ADMIN — IPRATROPIUM BROMIDE AND ALBUTEROL SULFATE 1 AMPULE: .5; 2.5 SOLUTION RESPIRATORY (INHALATION) at 17:15

## 2022-02-08 RX ADMIN — FLUTICASONE PROPIONATE 2 SPRAY: 50 SPRAY, METERED NASAL at 22:27

## 2022-02-08 RX ADMIN — MIRTAZAPINE 30 MG: 15 TABLET, FILM COATED ORAL at 20:50

## 2022-02-08 RX ADMIN — ACETAMINOPHEN 650 MG: 325 TABLET ORAL at 15:10

## 2022-02-08 RX ADMIN — Medication 5 ML: at 09:04

## 2022-02-08 RX ADMIN — ACETYLCYSTEINE 400 MG: 100 SOLUTION ORAL; RESPIRATORY (INHALATION) at 06:30

## 2022-02-08 RX ADMIN — ACETAMINOPHEN 650 MG: 325 TABLET ORAL at 06:02

## 2022-02-08 RX ADMIN — BUSPIRONE HYDROCHLORIDE 5 MG: 5 TABLET ORAL at 20:50

## 2022-02-08 RX ADMIN — BUPRENORPHINE HYDROCHLORIDE AND NALOXONE HYDROCHLORIDE DIHYDRATE 1 TABLET: 8; 2 TABLET SUBLINGUAL at 09:00

## 2022-02-08 RX ADMIN — GUAIFENESIN 1200 MG: 600 TABLET, EXTENDED RELEASE ORAL at 09:00

## 2022-02-08 RX ADMIN — ZINC SULFATE 220 MG (50 MG) CAPSULE 50 MG: CAPSULE at 09:00

## 2022-02-08 RX ADMIN — ENOXAPARIN SODIUM 30 MG: 100 INJECTION SUBCUTANEOUS at 20:51

## 2022-02-08 RX ADMIN — DIPHENHYDRAMINE HYDROCHLORIDE 25 MG: 50 INJECTION INTRAMUSCULAR; INTRAVENOUS at 20:51

## 2022-02-08 RX ADMIN — Medication 10 ML: at 20:52

## 2022-02-08 RX ADMIN — PYRIDOXINE HCL TAB 50 MG 50 MG: 50 TAB at 09:00

## 2022-02-08 RX ADMIN — Medication 10 MG: at 20:50

## 2022-02-08 RX ADMIN — IPRATROPIUM BROMIDE AND ALBUTEROL SULFATE 1 AMPULE: .5; 2.5 SOLUTION RESPIRATORY (INHALATION) at 06:30

## 2022-02-08 RX ADMIN — ENOXAPARIN SODIUM 30 MG: 100 INJECTION SUBCUTANEOUS at 09:00

## 2022-02-08 RX ADMIN — ACETYLCYSTEINE 400 MG: 100 SOLUTION ORAL; RESPIRATORY (INHALATION) at 17:15

## 2022-02-08 RX ADMIN — KETOROLAC TROMETHAMINE 30 MG: 30 INJECTION, SOLUTION INTRAMUSCULAR; INTRAVENOUS at 20:51

## 2022-02-08 RX ADMIN — Medication 2000 UNITS: at 09:00

## 2022-02-08 RX ADMIN — SODIUM CHLORIDE 500 ML: 9 INJECTION, SOLUTION INTRAVENOUS at 20:49

## 2022-02-08 RX ADMIN — BUPRENORPHINE HYDROCHLORIDE AND NALOXONE HYDROCHLORIDE DIHYDRATE 1 TABLET: 8; 2 TABLET SUBLINGUAL at 20:50

## 2022-02-08 RX ADMIN — IPRATROPIUM BROMIDE AND ALBUTEROL SULFATE 1 AMPULE: .5; 2.5 SOLUTION RESPIRATORY (INHALATION) at 13:45

## 2022-02-08 RX ADMIN — Medication 250 MG: at 09:00

## 2022-02-08 RX ADMIN — BUPRENORPHINE HYDROCHLORIDE AND NALOXONE HYDROCHLORIDE DIHYDRATE 1 TABLET: 8; 2 TABLET SUBLINGUAL at 15:10

## 2022-02-08 RX ADMIN — GUAIFENESIN 1200 MG: 600 TABLET, EXTENDED RELEASE ORAL at 20:51

## 2022-02-08 ASSESSMENT — PAIN SCALES - GENERAL
PAINLEVEL_OUTOF10: 9
PAINLEVEL_OUTOF10: 9
PAINLEVEL_OUTOF10: 4
PAINLEVEL_OUTOF10: 3
PAINLEVEL_OUTOF10: 6

## 2022-02-08 ASSESSMENT — PAIN DESCRIPTION - LOCATION: LOCATION: HEAD

## 2022-02-08 NOTE — PROGRESS NOTES
Department of Internal Medicine  General Internal Medicine  Attending Progress Note  Chief Complaint   Patient presents with    Shortness of Breath     Pt being seen at [facility] recovery, sob started this AM, hypoxic 84% on RA, rhonchi, wears O2 at home prn    Cough     SUBJECTIVE:    Reports that she is feeling better.  Aware that she failed ambulatory pulse ox test.     OBJECTIVE      Medications    Current Facility-Administered Medications: acetylcysteine (MUCOMYST) 10 % solution 400 mg, 4 mL, Inhalation, BID  0.9 % sodium chloride bolus, 30 mL, IntraVENous, PRN  sodium chloride flush 0.9 % injection 5-40 mL, 5-40 mL, IntraVENous, 2 times per day  sodium chloride flush 0.9 % injection 5-40 mL, 5-40 mL, IntraVENous, PRN  0.9 % sodium chloride infusion, 25 mL, IntraVENous, PRN  enoxaparin (LOVENOX) injection 30 mg, 30 mg, SubCUTAneous, BID  ondansetron (ZOFRAN-ODT) disintegrating tablet 4 mg, 4 mg, Oral, Q8H PRN **OR** ondansetron (ZOFRAN) injection 4 mg, 4 mg, IntraVENous, Q6H PRN  polyethylene glycol (GLYCOLAX) packet 17 g, 17 g, Oral, Daily PRN  acetaminophen (TYLENOL) tablet 650 mg, 650 mg, Oral, Q6H PRN **OR** acetaminophen (TYLENOL) suppository 650 mg, 650 mg, Rectal, Q6H PRN  Vitamin D (CHOLECALCIFEROL) tablet 2,000 Units, 2,000 Units, Oral, Daily  zinc sulfate (ZINCATE) capsule 50 mg, 50 mg, Oral, Daily  vitamin B-6 (PYRIDOXINE) tablet 50 mg, 50 mg, Oral, Daily  vitamin C tablet 250 mg, 250 mg, Oral, Daily  ipratropium-albuterol (DUONEB) nebulizer solution 1 ampule, 1 ampule, Inhalation, Q4H WA  busPIRone (BUSPAR) tablet 5 mg, 5 mg, Oral, BID  guaiFENesin (MUCINEX) extended release tablet 1,200 mg, 1,200 mg, Oral, BID  mirtazapine (REMERON) tablet 30 mg, 30 mg, Oral, Nightly  buprenorphine-naloxone (SUBOXONE) 8-2 MG SL tablet 1 tablet, 1 tablet, SubLINGual, TID  methylPREDNISolone sodium (SOLU-MEDROL) injection 40 mg, 40 mg, IntraVENous, Daily  Physical    VITALS:  BP (!) 111/55   Pulse 80   Temp 97.8 °F (36.6 °C)   Resp 16   Ht 5' 5\" (1.651 m)   Wt 115 lb 11.9 oz (52.5 kg)   SpO2 93%   BMI 19.26 kg/m²   CONSTITUTIONAL:  awake  EYES:  extra-ocular muscles intact  ENT:  atraumatic  NECK:  supple, symmetrical, trachea midline  LUNGS:  crackles diffuse  CARDIOVASCULAR:  normal apical pulses and normal S1 and S2  ABDOMEN:  normal bowel sounds, non-distended and non-tender  MUSCULOSKELETAL:  full range of motion noted  NEUROLOGIC:  Mental Status Exam:  Level of Alertness:   awake  Orientation:   person, place, time  SKIN:  no bruising or bleeding  Data    CBC:   Lab Results   Component Value Date    WBC 15.1 02/06/2022    RBC 4.80 02/06/2022    HGB 14.5 02/06/2022    HCT 45.9 02/06/2022    MCV 95.6 02/06/2022    MCH 30.2 02/06/2022    MCHC 31.6 02/06/2022    RDW 13.4 02/06/2022     02/06/2022    MPV 10.0 02/06/2022     BMP:    Lab Results   Component Value Date     02/07/2022    K 3.8 02/07/2022    K 3.2 02/03/2022     02/07/2022    CO2 28 02/07/2022    BUN 20 02/07/2022    LABALBU 3.1 02/07/2022    LABALBU 3.9 04/03/2012    CREATININE 0.4 02/07/2022    CALCIUM 9.9 02/07/2022    GFRAA >60 02/07/2022    LABGLOM >60 02/07/2022    GLUCOSE 88 02/07/2022    GLUCOSE 116 04/03/2012       ASSESSMENT AND PLAN      1. Acute hypoxemic respiratory failure due to COVID-19 ([address]  2. Tobacco Abuse  3. Hypokalemia  4. CAD  5. Pulm Nodules  6. Opioid Dependency:    Plan:  I did encourage patient to use incentive spirometry. Continue flutter valve and wean off oxygen as tolerated  Encourage smoking cessation  Completed Remdesivir. Continue solu-medrol.

## 2022-02-08 NOTE — PROGRESS NOTES
Patient o2 saturation at 90% at 4L at rest.   Ambulated on 4L. Desaturated to 84%.  Increased o2 to 8L to maintain >88% while ambulating

## 2022-02-09 PROCEDURE — 2580000003 HC RX 258: Performed by: STUDENT IN AN ORGANIZED HEALTH CARE EDUCATION/TRAINING PROGRAM

## 2022-02-09 PROCEDURE — 6370000000 HC RX 637 (ALT 250 FOR IP): Performed by: STUDENT IN AN ORGANIZED HEALTH CARE EDUCATION/TRAINING PROGRAM

## 2022-02-09 PROCEDURE — 6360000002 HC RX W HCPCS: Performed by: STUDENT IN AN ORGANIZED HEALTH CARE EDUCATION/TRAINING PROGRAM

## 2022-02-09 PROCEDURE — 94640 AIRWAY INHALATION TREATMENT: CPT

## 2022-02-09 PROCEDURE — 2060000000 HC ICU INTERMEDIATE R&B

## 2022-02-09 PROCEDURE — 2700000000 HC OXYGEN THERAPY PER DAY

## 2022-02-09 PROCEDURE — 6370000000 HC RX 637 (ALT 250 FOR IP): Performed by: FAMILY MEDICINE

## 2022-02-09 PROCEDURE — 6360000002 HC RX W HCPCS: Performed by: INTERNAL MEDICINE

## 2022-02-09 PROCEDURE — 99232 SBSQ HOSP IP/OBS MODERATE 35: CPT | Performed by: INTERNAL MEDICINE

## 2022-02-09 RX ORDER — METOCLOPRAMIDE HYDROCHLORIDE 5 MG/ML
10 INJECTION INTRAMUSCULAR; INTRAVENOUS ONCE
Status: COMPLETED | OUTPATIENT
Start: 2022-02-09 | End: 2022-02-09

## 2022-02-09 RX ORDER — DIPHENHYDRAMINE HYDROCHLORIDE 50 MG/ML
25 INJECTION INTRAMUSCULAR; INTRAVENOUS ONCE
Status: COMPLETED | OUTPATIENT
Start: 2022-02-09 | End: 2022-02-09

## 2022-02-09 RX ORDER — KETOROLAC TROMETHAMINE 30 MG/ML
30 INJECTION, SOLUTION INTRAMUSCULAR; INTRAVENOUS ONCE
Status: COMPLETED | OUTPATIENT
Start: 2022-02-09 | End: 2022-02-09

## 2022-02-09 RX ADMIN — METHYLPREDNISOLONE SODIUM SUCCINATE 40 MG: 40 INJECTION, POWDER, FOR SOLUTION INTRAMUSCULAR; INTRAVENOUS at 08:11

## 2022-02-09 RX ADMIN — IPRATROPIUM BROMIDE AND ALBUTEROL SULFATE 1 AMPULE: .5; 2.5 SOLUTION RESPIRATORY (INHALATION) at 07:12

## 2022-02-09 RX ADMIN — Medication 2000 UNITS: at 08:11

## 2022-02-09 RX ADMIN — ENOXAPARIN SODIUM 30 MG: 100 INJECTION SUBCUTANEOUS at 20:59

## 2022-02-09 RX ADMIN — IPRATROPIUM BROMIDE AND ALBUTEROL SULFATE 1 AMPULE: .5; 2.5 SOLUTION RESPIRATORY (INHALATION) at 13:53

## 2022-02-09 RX ADMIN — MIRTAZAPINE 30 MG: 15 TABLET, FILM COATED ORAL at 20:58

## 2022-02-09 RX ADMIN — ZINC SULFATE 220 MG (50 MG) CAPSULE 50 MG: CAPSULE at 08:11

## 2022-02-09 RX ADMIN — BUSPIRONE HYDROCHLORIDE 5 MG: 5 TABLET ORAL at 20:59

## 2022-02-09 RX ADMIN — BUPRENORPHINE HYDROCHLORIDE AND NALOXONE HYDROCHLORIDE DIHYDRATE 1 TABLET: 8; 2 TABLET SUBLINGUAL at 20:58

## 2022-02-09 RX ADMIN — BUPRENORPHINE HYDROCHLORIDE AND NALOXONE HYDROCHLORIDE DIHYDRATE 1 TABLET: 8; 2 TABLET SUBLINGUAL at 08:11

## 2022-02-09 RX ADMIN — BUSPIRONE HYDROCHLORIDE 5 MG: 5 TABLET ORAL at 08:11

## 2022-02-09 RX ADMIN — Medication 250 MG: at 08:11

## 2022-02-09 RX ADMIN — ONDANSETRON 4 MG: 2 INJECTION INTRAMUSCULAR; INTRAVENOUS at 13:11

## 2022-02-09 RX ADMIN — PYRIDOXINE HCL TAB 50 MG 50 MG: 50 TAB at 08:11

## 2022-02-09 RX ADMIN — IPRATROPIUM BROMIDE AND ALBUTEROL SULFATE 1 AMPULE: .5; 2.5 SOLUTION RESPIRATORY (INHALATION) at 18:45

## 2022-02-09 RX ADMIN — Medication 10 MG: at 20:58

## 2022-02-09 RX ADMIN — ACETAMINOPHEN 650 MG: 325 TABLET ORAL at 05:26

## 2022-02-09 RX ADMIN — DIPHENHYDRAMINE HYDROCHLORIDE 25 MG: 50 INJECTION INTRAMUSCULAR; INTRAVENOUS at 22:45

## 2022-02-09 RX ADMIN — ACETAMINOPHEN 650 MG: 325 TABLET ORAL at 13:11

## 2022-02-09 RX ADMIN — Medication 10 ML: at 20:59

## 2022-02-09 RX ADMIN — ACETAMINOPHEN 650 MG: 325 TABLET ORAL at 19:58

## 2022-02-09 RX ADMIN — IPRATROPIUM BROMIDE AND ALBUTEROL SULFATE 1 AMPULE: .5; 2.5 SOLUTION RESPIRATORY (INHALATION) at 10:19

## 2022-02-09 RX ADMIN — GUAIFENESIN 1200 MG: 600 TABLET, EXTENDED RELEASE ORAL at 08:11

## 2022-02-09 RX ADMIN — KETOROLAC TROMETHAMINE 30 MG: 30 INJECTION, SOLUTION INTRAMUSCULAR; INTRAVENOUS at 22:45

## 2022-02-09 RX ADMIN — ACETYLCYSTEINE 400 MG: 100 SOLUTION ORAL; RESPIRATORY (INHALATION) at 07:12

## 2022-02-09 RX ADMIN — BUPRENORPHINE HYDROCHLORIDE AND NALOXONE HYDROCHLORIDE DIHYDRATE 1 TABLET: 8; 2 TABLET SUBLINGUAL at 14:05

## 2022-02-09 RX ADMIN — FLUTICASONE PROPIONATE 2 SPRAY: 50 SPRAY, METERED NASAL at 08:15

## 2022-02-09 RX ADMIN — ENOXAPARIN SODIUM 30 MG: 100 INJECTION SUBCUTANEOUS at 08:11

## 2022-02-09 RX ADMIN — METOCLOPRAMIDE 10 MG: 5 INJECTION, SOLUTION INTRAMUSCULAR; INTRAVENOUS at 22:45

## 2022-02-09 RX ADMIN — Medication 5 ML: at 08:16

## 2022-02-09 RX ADMIN — ONDANSETRON 4 MG: 2 INJECTION INTRAMUSCULAR; INTRAVENOUS at 19:58

## 2022-02-09 RX ADMIN — ACETYLCYSTEINE 400 MG: 100 SOLUTION ORAL; RESPIRATORY (INHALATION) at 18:45

## 2022-02-09 RX ADMIN — GUAIFENESIN 1200 MG: 600 TABLET, EXTENDED RELEASE ORAL at 20:58

## 2022-02-09 ASSESSMENT — PAIN SCALES - GENERAL
PAINLEVEL_OUTOF10: 8
PAINLEVEL_OUTOF10: 7
PAINLEVEL_OUTOF10: 9
PAINLEVEL_OUTOF10: 8
PAINLEVEL_OUTOF10: 8

## 2022-02-09 NOTE — PROGRESS NOTES
Comprehensive Nutrition Assessment    Type and Reason for Visit:  Initial,RD Nutrition Re-Screen/LOS    Nutrition Recommendations/Plan: Start Ensure Enlive BID    Nutrition Assessment:  Pt admits w/ Acute Respiratory Failue 2/2 CoVID w/ H/o Opioid Use, CAD, Pt tolerates diet w/ ~58% PO intakes, will start ONS BID and monitor    Malnutrition Assessment:  Malnutrition Status: At risk for malnutrition (Comment)    Context:  Acute Illness     Findings of the 6 clinical characteristics of malnutrition:  Energy Intake:  Mild decrease in energy intake (Comment)  Weight Loss:  Unable to assess (no wt hx)     Body Fat Loss:  Unable to assess (CoVID)     Muscle Mass Loss:  Unable to assess (CoVID)    Fluid Accumulation:  No significant fluid accumulation     Strength:  Not Performed        Nutrition Related Findings:  +I/O +4.1L      Wounds:  None       Current Nutrition Therapies:    ADULT DIET;  Regular; Low Fat/Low Chol/High Fiber/NE  ADULT ORAL NUTRITION SUPPLEMENT; Breakfast, Dinner; Standard High Calorie/High Protein Oral Supplement    Anthropometric Measures:  · Height: 5' 5\" (165.1 cm)  · Current Body Weight: 115 lb (52.2 kg) (2/4 bed)    · Usual Body Weight:  (no wt hx)     · Ideal Body Weight: 125 lbs; % Ideal Body Weight 92 %   · BMI: 19.1  · BMI Categories: Underweight (BMI less than 22) age over 72       Nutrition Diagnosis:   · Inadequate oral intake related to impaired respiratory function as evidenced by intake 51-75%      Nutrition Interventions:   Food and/or Nutrient Delivery:  Continue Current Diet,Start Oral Nutrition Supplement  Nutrition Education/Counseling:  No recommendation at this time   Coordination of Nutrition Care:  Continue to monitor while inpatient    Goals:  Consume >75% meals/ONS       Nutrition Monitoring and Evaluation:   Behavioral-Environmental Outcomes:  None Identified   Food/Nutrient Intake Outcomes:  Food and Nutrient Intake,Supplement Intake  Physical Signs/Symptoms Outcomes:  Biochemical Data,GI Status,Fluid Status or Edema,Nutrition Focused Physical Findings,Skin,Weight     Discharge Planning:     Too soon to determine     Electronically signed by Ernie Pacheco RD, LD on 2/9/22 at 6:40 PM EST    Contact: [phone]

## 2022-02-09 NOTE — PLAN OF CARE
Problem: Inadequate oral food/beverage intake (NI-2.1)  Goal: Food and/or Nutrient Delivery  Description: Ensure Enlive BID  Outcome: Met This Shift

## 2022-02-09 NOTE — PROGRESS NOTES
Department of Internal Medicine  General Internal Medicine  Attending Progress Note  Chief Complaint   Patient presents with    Shortness of Breath     Pt being seen at [facility], sob started this AM, hypoxic 84% on RA, rhonchi, wears O2 at home prn    Cough     SUBJECTIVE:    Reports that she is breathing better. No fever or chills. No chest pain. She is aware of plans to continue to wean her off oxygen and importance of using incentive spirometry and pep flutter.      OBJECTIVE      Medications    Current Facility-Administered Medications: melatonin disintegrating tablet 10 mg, 10 mg, Oral, Nightly  fluticasone (FLONASE) 50 MCG/ACT nasal spray 2 spray, 2 spray, Each Nostril, Daily  acetylcysteine (MUCOMYST) 10 % solution 400 mg, 4 mL, Inhalation, BID  0.9 % sodium chloride bolus, 30 mL, IntraVENous, PRN  sodium chloride flush 0.9 % injection 5-40 mL, 5-40 mL, IntraVENous, 2 times per day  sodium chloride flush 0.9 % injection 5-40 mL, 5-40 mL, IntraVENous, PRN  0.9 % sodium chloride infusion, 25 mL, IntraVENous, PRN  enoxaparin (LOVENOX) injection 30 mg, 30 mg, SubCUTAneous, BID  ondansetron (ZOFRAN-ODT) disintegrating tablet 4 mg, 4 mg, Oral, Q8H PRN **OR** ondansetron (ZOFRAN) injection 4 mg, 4 mg, IntraVENous, Q6H PRN  polyethylene glycol (GLYCOLAX) packet 17 g, 17 g, Oral, Daily PRN  acetaminophen (TYLENOL) tablet 650 mg, 650 mg, Oral, Q6H PRN **OR** acetaminophen (TYLENOL) suppository 650 mg, 650 mg, Rectal, Q6H PRN  Vitamin D (CHOLECALCIFEROL) tablet 2,000 Units, 2,000 Units, Oral, Daily  zinc sulfate (ZINCATE) capsule 50 mg, 50 mg, Oral, Daily  vitamin B-6 (PYRIDOXINE) tablet 50 mg, 50 mg, Oral, Daily  vitamin C tablet 250 mg, 250 mg, Oral, Daily  ipratropium-albuterol (DUONEB) nebulizer solution 1 ampule, 1 ampule, Inhalation, Q4H WA  busPIRone (BUSPAR) tablet 5 mg, 5 mg, Oral, BID  guaiFENesin (MUCINEX) extended release tablet 1,200 mg, 1,200 mg, Oral, BID  mirtazapine (REMERON) tablet 30 mg, 30 mg, Oral, Nightly  buprenorphine-naloxone (SUBOXONE) 8-2 MG SL tablet 1 tablet, 1 tablet, SubLINGual, TID  methylPREDNISolone sodium (SOLU-MEDROL) injection 40 mg, 40 mg, IntraVENous, Daily  Physical    VITALS:  BP (!) 116/57   Pulse 84   Temp 97.8 °F (36.6 °C)   Resp 18   Ht 5' 5\" (1.651 m)   Wt 115 lb 11.9 oz (52.5 kg)   SpO2 91%   BMI 19.26 kg/m²   CONSTITUTIONAL:  awake and alert  EYES:  extra-ocular muscles intact and vision intact  ENT:  atraumatic  LUNGS:  good air exchange and no crackles or wheezing  CARDIOVASCULAR:  normal apical pulses, normal S1 and S2 and no edema  ABDOMEN:  normal bowel sounds, soft and non-tender  MUSCULOSKELETAL:  there is no redness, warmth, or swelling of the joints  NEUROLOGIC:  Mental Status Exam:  Level of Alertness:   awake  Orientation:   person, place, time  SKIN:  no bruising or bleeding  Data    CBC:   Lab Results   Component Value Date    WBC 15.1 02/06/2022    RBC 4.80 02/06/2022    HGB 14.5 02/06/2022    HCT 45.9 02/06/2022    MCV 95.6 02/06/2022    MCH 30.2 02/06/2022    MCHC 31.6 02/06/2022    RDW 13.4 02/06/2022     02/06/2022    MPV 10.0 02/06/2022     BMP:    Lab Results   Component Value Date     02/07/2022    K 3.8 02/07/2022    K 3.2 02/03/2022     02/07/2022    CO2 28 02/07/2022    BUN 20 02/07/2022    LABALBU 3.1 02/07/2022    LABALBU 3.9 04/03/2012    CREATININE 0.4 02/07/2022    CALCIUM 9.9 02/07/2022    GFRAA >60 02/07/2022    LABGLOM >60 02/07/2022    GLUCOSE 88 02/07/2022    GLUCOSE 116 04/03/2012       ASSESSMENT AND PLAN      1. Acute hypoxemic respiratory failure due to COVID-19 ([address]  2. Hx of Opioid Abuse  3. Hypokalemia: resolved. Discontinue K-Dur  4. CAD: continue secondary prophylaxis  5. Tobacco Abuse: nicotine patch as needed  6. Pulm Nodules: work up as outpatient. Wean off oxygen as tolerated. Encourage use of incentive spirometry and mucinex.  Discharge planing pending improved oxygen requirement on supplemental oxygen

## 2022-02-10 LAB
ALBUMIN SERPL-MCNC: 3.1 G/DL (ref 3.5–5.2)
ALP BLD-CCNC: 75 U/L (ref 35–104)
ALT SERPL-CCNC: 31 U/L (ref 0–32)
ANION GAP SERPL CALCULATED.3IONS-SCNC: 10 MMOL/L (ref 7–16)
AST SERPL-CCNC: 25 U/L (ref 0–31)
BASOPHILS ABSOLUTE: 0.03 E9/L (ref 0–0.2)
BASOPHILS RELATIVE PERCENT: 0.2 % (ref 0–2)
BILIRUB SERPL-MCNC: <0.2 MG/DL (ref 0–1.2)
BUN BLDV-MCNC: 26 MG/DL (ref 6–23)
C-REACTIVE PROTEIN: 7.1 MG/DL (ref 0–0.4)
CALCIUM SERPL-MCNC: 9.7 MG/DL (ref 8.6–10.2)
CHLORIDE BLD-SCNC: 99 MMOL/L (ref 98–107)
CO2: 27 MMOL/L (ref 22–29)
CREAT SERPL-MCNC: 0.5 MG/DL (ref 0.5–1)
EOSINOPHILS ABSOLUTE: 0.04 E9/L (ref 0.05–0.5)
EOSINOPHILS RELATIVE PERCENT: 0.3 % (ref 0–6)
FERRITIN: 220 NG/ML
FIBRINOGEN: >700 MG/DL (ref 225–540)
GFR AFRICAN AMERICAN: >60
GFR NON-AFRICAN AMERICAN: >60 ML/MIN/1.73
GLUCOSE BLD-MCNC: 91 MG/DL (ref 74–99)
HCT VFR BLD CALC: 34.1 % (ref 34–48)
HEMOGLOBIN: 11.2 G/DL (ref 11.5–15.5)
IMMATURE GRANULOCYTES #: 0.15 E9/L
IMMATURE GRANULOCYTES %: 1 % (ref 0–5)
LACTATE DEHYDROGENASE: 173 U/L (ref 135–214)
LYMPHOCYTES ABSOLUTE: 2.22 E9/L (ref 1.5–4)
LYMPHOCYTES RELATIVE PERCENT: 15.1 % (ref 20–42)
MCH RBC QN AUTO: 32.1 PG (ref 26–35)
MCHC RBC AUTO-ENTMCNC: 32.8 % (ref 32–34.5)
MCV RBC AUTO: 97.7 FL (ref 80–99.9)
MONOCYTES ABSOLUTE: 0.91 E9/L (ref 0.1–0.95)
MONOCYTES RELATIVE PERCENT: 6.2 % (ref 2–12)
NEUTROPHILS ABSOLUTE: 11.35 E9/L (ref 1.8–7.3)
NEUTROPHILS RELATIVE PERCENT: 77.2 % (ref 43–80)
PDW BLD-RTO: 13.4 FL (ref 11.5–15)
PLATELET # BLD: 454 E9/L (ref 130–450)
PMV BLD AUTO: 9.7 FL (ref 7–12)
POTASSIUM SERPL-SCNC: 4.1 MMOL/L (ref 3.5–5)
RBC # BLD: 3.49 E12/L (ref 3.5–5.5)
SODIUM BLD-SCNC: 136 MMOL/L (ref 132–146)
TOTAL PROTEIN: 6.4 G/DL (ref 6.4–8.3)
WBC # BLD: 14.7 E9/L (ref 4.5–11.5)

## 2022-02-10 PROCEDURE — 80053 COMPREHEN METABOLIC PANEL: CPT

## 2022-02-10 PROCEDURE — 6360000002 HC RX W HCPCS: Performed by: STUDENT IN AN ORGANIZED HEALTH CARE EDUCATION/TRAINING PROGRAM

## 2022-02-10 PROCEDURE — 83615 LACTATE (LD) (LDH) ENZYME: CPT

## 2022-02-10 PROCEDURE — 85384 FIBRINOGEN ACTIVITY: CPT

## 2022-02-10 PROCEDURE — 6370000000 HC RX 637 (ALT 250 FOR IP): Performed by: FAMILY MEDICINE

## 2022-02-10 PROCEDURE — 99232 SBSQ HOSP IP/OBS MODERATE 35: CPT | Performed by: STUDENT IN AN ORGANIZED HEALTH CARE EDUCATION/TRAINING PROGRAM

## 2022-02-10 PROCEDURE — 86140 C-REACTIVE PROTEIN: CPT

## 2022-02-10 PROCEDURE — 6370000000 HC RX 637 (ALT 250 FOR IP): Performed by: STUDENT IN AN ORGANIZED HEALTH CARE EDUCATION/TRAINING PROGRAM

## 2022-02-10 PROCEDURE — 2700000000 HC OXYGEN THERAPY PER DAY

## 2022-02-10 PROCEDURE — 2060000000 HC ICU INTERMEDIATE R&B

## 2022-02-10 PROCEDURE — 85025 COMPLETE CBC W/AUTO DIFF WBC: CPT

## 2022-02-10 PROCEDURE — 36415 COLL VENOUS BLD VENIPUNCTURE: CPT

## 2022-02-10 PROCEDURE — 94640 AIRWAY INHALATION TREATMENT: CPT

## 2022-02-10 PROCEDURE — 82728 ASSAY OF FERRITIN: CPT

## 2022-02-10 PROCEDURE — 2580000003 HC RX 258: Performed by: STUDENT IN AN ORGANIZED HEALTH CARE EDUCATION/TRAINING PROGRAM

## 2022-02-10 RX ORDER — METOCLOPRAMIDE HYDROCHLORIDE 5 MG/ML
5 INJECTION INTRAMUSCULAR; INTRAVENOUS ONCE
Status: COMPLETED | OUTPATIENT
Start: 2022-02-10 | End: 2022-02-10

## 2022-02-10 RX ORDER — KETOROLAC TROMETHAMINE 15 MG/ML
15 INJECTION, SOLUTION INTRAMUSCULAR; INTRAVENOUS ONCE
Status: COMPLETED | OUTPATIENT
Start: 2022-02-10 | End: 2022-02-10

## 2022-02-10 RX ORDER — DIPHENHYDRAMINE HYDROCHLORIDE 50 MG/ML
12.5 INJECTION INTRAMUSCULAR; INTRAVENOUS ONCE
Status: COMPLETED | OUTPATIENT
Start: 2022-02-10 | End: 2022-02-10

## 2022-02-10 RX ADMIN — ONDANSETRON 4 MG: 2 INJECTION INTRAMUSCULAR; INTRAVENOUS at 08:16

## 2022-02-10 RX ADMIN — PYRIDOXINE HCL TAB 50 MG 50 MG: 50 TAB at 08:19

## 2022-02-10 RX ADMIN — BUPRENORPHINE HYDROCHLORIDE AND NALOXONE HYDROCHLORIDE DIHYDRATE 1 TABLET: 8; 2 TABLET SUBLINGUAL at 20:27

## 2022-02-10 RX ADMIN — BUPRENORPHINE HYDROCHLORIDE AND NALOXONE HYDROCHLORIDE DIHYDRATE 1 TABLET: 8; 2 TABLET SUBLINGUAL at 08:19

## 2022-02-10 RX ADMIN — FLUTICASONE PROPIONATE 2 SPRAY: 50 SPRAY, METERED NASAL at 08:20

## 2022-02-10 RX ADMIN — BUPRENORPHINE HYDROCHLORIDE AND NALOXONE HYDROCHLORIDE DIHYDRATE 1 TABLET: 8; 2 TABLET SUBLINGUAL at 13:50

## 2022-02-10 RX ADMIN — ENOXAPARIN SODIUM 30 MG: 100 INJECTION SUBCUTANEOUS at 20:28

## 2022-02-10 RX ADMIN — ACETAMINOPHEN 650 MG: 325 TABLET ORAL at 05:18

## 2022-02-10 RX ADMIN — BUSPIRONE HYDROCHLORIDE 5 MG: 5 TABLET ORAL at 08:19

## 2022-02-10 RX ADMIN — DIPHENHYDRAMINE HYDROCHLORIDE 12.5 MG: 50 INJECTION INTRAMUSCULAR; INTRAVENOUS at 13:50

## 2022-02-10 RX ADMIN — ENOXAPARIN SODIUM 30 MG: 100 INJECTION SUBCUTANEOUS at 08:19

## 2022-02-10 RX ADMIN — METHYLPREDNISOLONE SODIUM SUCCINATE 40 MG: 40 INJECTION, POWDER, FOR SOLUTION INTRAMUSCULAR; INTRAVENOUS at 08:19

## 2022-02-10 RX ADMIN — BUSPIRONE HYDROCHLORIDE 5 MG: 5 TABLET ORAL at 20:28

## 2022-02-10 RX ADMIN — ACETYLCYSTEINE 400 MG: 100 SOLUTION ORAL; RESPIRATORY (INHALATION) at 06:27

## 2022-02-10 RX ADMIN — ACETAMINOPHEN 650 MG: 325 TABLET ORAL at 20:28

## 2022-02-10 RX ADMIN — IPRATROPIUM BROMIDE AND ALBUTEROL SULFATE 1 AMPULE: .5; 2.5 SOLUTION RESPIRATORY (INHALATION) at 14:19

## 2022-02-10 RX ADMIN — GUAIFENESIN 1200 MG: 600 TABLET, EXTENDED RELEASE ORAL at 08:19

## 2022-02-10 RX ADMIN — Medication 10 ML: at 20:28

## 2022-02-10 RX ADMIN — Medication 10 ML: at 08:20

## 2022-02-10 RX ADMIN — METOCLOPRAMIDE 5 MG: 5 INJECTION, SOLUTION INTRAMUSCULAR; INTRAVENOUS at 13:50

## 2022-02-10 RX ADMIN — ACETYLCYSTEINE 400 MG: 100 SOLUTION ORAL; RESPIRATORY (INHALATION) at 18:06

## 2022-02-10 RX ADMIN — GUAIFENESIN 1200 MG: 600 TABLET, EXTENDED RELEASE ORAL at 20:27

## 2022-02-10 RX ADMIN — IPRATROPIUM BROMIDE AND ALBUTEROL SULFATE 1 AMPULE: .5; 2.5 SOLUTION RESPIRATORY (INHALATION) at 18:06

## 2022-02-10 RX ADMIN — Medication 2000 UNITS: at 08:19

## 2022-02-10 RX ADMIN — IPRATROPIUM BROMIDE AND ALBUTEROL SULFATE 1 AMPULE: .5; 2.5 SOLUTION RESPIRATORY (INHALATION) at 06:27

## 2022-02-10 RX ADMIN — Medication 250 MG: at 08:19

## 2022-02-10 RX ADMIN — MIRTAZAPINE 30 MG: 15 TABLET, FILM COATED ORAL at 20:28

## 2022-02-10 RX ADMIN — ACETAMINOPHEN 650 MG: 325 TABLET ORAL at 12:43

## 2022-02-10 RX ADMIN — KETOROLAC TROMETHAMINE 15 MG: 15 INJECTION, SOLUTION INTRAMUSCULAR; INTRAVENOUS at 13:50

## 2022-02-10 RX ADMIN — ZINC SULFATE 220 MG (50 MG) CAPSULE 50 MG: CAPSULE at 08:19

## 2022-02-10 RX ADMIN — Medication 10 MG: at 20:28

## 2022-02-10 ASSESSMENT — PAIN - FUNCTIONAL ASSESSMENT
PAIN_FUNCTIONAL_ASSESSMENT: ACTIVITIES ARE NOT PREVENTED
PAIN_FUNCTIONAL_ASSESSMENT: ACTIVITIES ARE NOT PREVENTED

## 2022-02-10 ASSESSMENT — PAIN DESCRIPTION - DESCRIPTORS
DESCRIPTORS: ACHING
DESCRIPTORS: ACHING

## 2022-02-10 ASSESSMENT — PAIN SCALES - GENERAL
PAINLEVEL_OUTOF10: 5
PAINLEVEL_OUTOF10: 8
PAINLEVEL_OUTOF10: 4

## 2022-02-10 ASSESSMENT — PAIN DESCRIPTION - PAIN TYPE
TYPE: ACUTE PAIN
TYPE: ACUTE PAIN

## 2022-02-10 ASSESSMENT — PAIN DESCRIPTION - PROGRESSION
CLINICAL_PROGRESSION: NOT CHANGED
CLINICAL_PROGRESSION: NOT CHANGED

## 2022-02-10 ASSESSMENT — PAIN DESCRIPTION - FREQUENCY
FREQUENCY: INTERMITTENT
FREQUENCY: INTERMITTENT

## 2022-02-10 ASSESSMENT — PAIN DESCRIPTION - LOCATION
LOCATION: HEAD
LOCATION: HEAD

## 2022-02-10 ASSESSMENT — PAIN DESCRIPTION - ONSET: ONSET: ON-GOING

## 2022-02-10 NOTE — CARE COORDINATION
SOCIAL WORK / DISCHARGE PLANNING:  COVID positive 2/2. Walk test completed this date, pt required 15L with ambulation. Dc goal remains to return home, new testing and script needed for [facility]. Denies need for HHC , dtr will provide home going transport.                  Electronically signed by NERY El on 2/10/2022 at 2:33 PM

## 2022-02-10 NOTE — PROGRESS NOTES
Spoke with Dr Lady Valdez to notify her pt has tramadol listed as an allergy but took it last night without difficulty. spoke with pharmacist about above and she states nausea/vomiting is intolerance not allergy and it should be ok to administer.

## 2022-02-10 NOTE — PROGRESS NOTES
[address] [place], [place] [address]ist   Progress Note    Admitting Date and Time: 2/2/2022 11:15 AM  Admit Dx: [facility]emic [facility] [U07.1, J96.01]    Subjective:    Pt states she does not feel too bad other than having a headache. She states the headache is in the L side of her face, 8/10 and has sensitivity to light and some nausea. She states she used to have migraines but has not had one in a long time. Per RN: nothing to report    ROS: denies fever, chills, cp, sob, n/v, HA unless stated above.      nicotine  1 patch TransDERmal Daily    melatonin  10 mg Oral Nightly    fluticasone  2 spray Each Nostril Daily    acetylcysteine  4 mL Inhalation BID    sodium chloride flush  5-40 mL IntraVENous 2 times per day    enoxaparin  30 mg SubCUTAneous BID    Vitamin D  2,000 Units Oral Daily    zinc sulfate  50 mg Oral Daily    vitamin B-6  50 mg Oral Daily    vitamin C  250 mg Oral Daily    ipratropium-albuterol  1 ampule Inhalation Q4H WA    busPIRone  5 mg Oral BID    guaiFENesin  1,200 mg Oral BID    mirtazapine  30 mg Oral Nightly    buprenorphine-naloxone  1 tablet SubLINGual TID    methylPREDNISolone  40 mg IntraVENous Daily     sodium chloride, 30 mL, PRN  sodium chloride flush, 5-40 mL, PRN  sodium chloride, 25 mL, PRN  ondansetron, 4 mg, Q8H PRN   Or  ondansetron, 4 mg, Q6H PRN  polyethylene glycol, 17 g, Daily PRN  acetaminophen, 650 mg, Q6H PRN   Or  acetaminophen, 650 mg, Q6H PRN         Objective:    BP (!) 108/55   Pulse 68   Temp 98.1 °F (36.7 °C) (Infrared)   Resp 20   Ht 5' 5\" (1.651 m)   Wt 115 lb 11.9 oz (52.5 kg)   SpO2 (!) 89%   BMI 19.26 kg/m²   General Appearance: alert and oriented to person, place and time and in no acute distress, thin  Skin: warm and dry  Head: normocephalic and atraumatic  Eyes: pupils equal, round, and reactive to light, extraocular eye movements intact, conjunctivae normal  Neck: neck supple and non tender without mass   Pulmonary/Chest:mild coarse breath sounds throughout, slightly reduced air movement, no respiratory distress on 4L NC  Cardiovascular: normal rate, normal S1 and S2 and no carotid bruits  Abdomen: soft, non-tender, non-distended, normal bowel sounds, no masses or organomegaly  Extremities: no cyanosis, no clubbing and no edema  Neurologic: no cranial nerve deficit and speech normal      Recent Labs     02/10/22  0457      K 4.1   CL 99   CO2 27   BUN 26*   CREATININE 0.5   GLUCOSE 91   CALCIUM 9.7       Recent Labs     02/10/22  0457   ALKPHOS 75   PROT 6.4   LABALBU 3.1*   BILITOT <0.2   AST 25   ALT 31       Recent Labs     02/10/22  0457   WBC 14.7*   RBC 3.49*   HGB 11.2*   HCT 34.1   MCV 97.7   MCH 32.1   MCHC 32.8   RDW 13.4   *   MPV 9.7         Radiology:   CTA CHEST W CONTRAST   Final Result   1. No CT evidence of pulmonary embolism. 2.  Mild upper lobe centrilobular emphysema. 3.  Mild lower lobe bronchiectasis with soft tissue density within multiple   lower lobe bronchi posteriorly most compatible with mucous plugging. Focal   area of consolidation in the right lower lobe suggestive of pneumonia. Probable reactive hilar adenopathy bilaterally. 4.  7 mm noncalcified soft tissue pulmonary nodule in the peripheral left   lower lobe. RECOMMENDATIONS:   Follow-up noncontrast CT chest in 3-6 months recommended. Fleischner Society guidelines for follow-up and management of incidentally   detected pulmonary nodules:      Multiple Solid Nodules:      ~Nodule size equals 6-8 mm. In a low-risk patient, CT at 3-6 months, then   consider CT at 18-24 months. In a high-risk patient, CT at 3-6 months, then   CT at 18-24 months. Radiology 2017 http://pubs. rsna.org/doi/full/10.1148/radiol. 3680635982         XR CHEST PORTABLE   Final Result   No acute process.              Assessment:  Active Problems:    Acute hypoxemic respiratory failure due to COVID-19 [facility])  Resolved Problems:    * No resolved hospital problems. *      Plan:  1. Acute respiratory failure 2/2 COVID  -Patient currently on 4L NC, apparently has O2 at home from old admission and occasionally wears 3L  -CXR unremarkable, CTA showed no PE, mild lower lobe bronchiectasis consistent with mucous plugging and a 7mm pulmonary nodule in LLL  -Continue with solumedrol 40mg QD, today is day 9  -Received 5 doses of remdesivir  -Continue duonebs, incentive spirometer and PEP/flutter. Encourage proning  -Procal, strep/legionella negative  -Monitor O2 saturations, titrate O2 down as able     2. Mucous plugging  -Continue mucomyst and PEP/flutter  -Monitor, if any worsening in condition may need pulm consult     3. Pulmonary nodule  -7mm, noncalcified  -Patient is a smoker  -Recommendations per radiologist to follow up non-contrast CT chest in 3-6 months - discussed with patient.     4. Suboxone use  -Patient states she was previously addicted to pain pills but now has been on suboxone  -Obtained a prescription record with dosing (8-2mg TID) from [facility], so will resume home med     5. Anxiety/depression  -History established, home meds buspar 5mg BID and remeron 30mg QHS - will continue     6. CAD  -Patient states she has a stent placed about 10 years ago  -Only records available in chart mention cath done in 2002 by Dr. Paula Triana but no more details able to be obtained  -Not currently on any statin or aspirin, will try to get records and consider starting these medications. 7.   Headache  -Patient reports headache last night and this morning  -Last night she got toradol, benadryl and reglan which helped  -Will repeat above at reduced dose  -Possible migraine as is with nausea and photophobia. May also be related to nicotine withdrawal, patient requested nicotine patch    NOTE: This report was transcribed using voice recognition software.  Every effort was made to ensure accuracy; however, inadvertent computerized transcription errors may be present.      Electronically signed by Isaac Christopher MD on 2/10/2022 at 2:00 PM

## 2022-02-10 NOTE — PROGRESS NOTES
Pulse ox was 92% on 4 liters at rest.  Ambulated patient on 4 liters  Oxygen saturation was 82% on 4 liters while ambulating. Oxygen increased to 15 liters hiflow. Recovery pulse ox was 87-88% on 15 liters of oxygen while ambulating.

## 2022-02-11 PROCEDURE — 2060000000 HC ICU INTERMEDIATE R&B

## 2022-02-11 PROCEDURE — 6370000000 HC RX 637 (ALT 250 FOR IP): Performed by: FAMILY MEDICINE

## 2022-02-11 PROCEDURE — 6360000002 HC RX W HCPCS: Performed by: STUDENT IN AN ORGANIZED HEALTH CARE EDUCATION/TRAINING PROGRAM

## 2022-02-11 PROCEDURE — 99232 SBSQ HOSP IP/OBS MODERATE 35: CPT | Performed by: STUDENT IN AN ORGANIZED HEALTH CARE EDUCATION/TRAINING PROGRAM

## 2022-02-11 PROCEDURE — 94640 AIRWAY INHALATION TREATMENT: CPT

## 2022-02-11 PROCEDURE — 2700000000 HC OXYGEN THERAPY PER DAY

## 2022-02-11 PROCEDURE — 6370000000 HC RX 637 (ALT 250 FOR IP): Performed by: STUDENT IN AN ORGANIZED HEALTH CARE EDUCATION/TRAINING PROGRAM

## 2022-02-11 PROCEDURE — 94669 MECHANICAL CHEST WALL OSCILL: CPT

## 2022-02-11 PROCEDURE — 2580000003 HC RX 258: Performed by: STUDENT IN AN ORGANIZED HEALTH CARE EDUCATION/TRAINING PROGRAM

## 2022-02-11 RX ADMIN — GUAIFENESIN 1200 MG: 600 TABLET, EXTENDED RELEASE ORAL at 08:28

## 2022-02-11 RX ADMIN — GUAIFENESIN 1200 MG: 600 TABLET, EXTENDED RELEASE ORAL at 20:44

## 2022-02-11 RX ADMIN — MIRTAZAPINE 30 MG: 15 TABLET, FILM COATED ORAL at 20:44

## 2022-02-11 RX ADMIN — ONDANSETRON 4 MG: 2 INJECTION INTRAMUSCULAR; INTRAVENOUS at 20:40

## 2022-02-11 RX ADMIN — Medication 10 MG: at 20:43

## 2022-02-11 RX ADMIN — BUSPIRONE HYDROCHLORIDE 5 MG: 5 TABLET ORAL at 20:44

## 2022-02-11 RX ADMIN — IPRATROPIUM BROMIDE AND ALBUTEROL SULFATE 1 AMPULE: .5; 2.5 SOLUTION RESPIRATORY (INHALATION) at 10:09

## 2022-02-11 RX ADMIN — ZINC SULFATE 220 MG (50 MG) CAPSULE 50 MG: CAPSULE at 08:27

## 2022-02-11 RX ADMIN — ENOXAPARIN SODIUM 30 MG: 100 INJECTION SUBCUTANEOUS at 08:28

## 2022-02-11 RX ADMIN — ACETYLCYSTEINE 400 MG: 100 SOLUTION ORAL; RESPIRATORY (INHALATION) at 06:54

## 2022-02-11 RX ADMIN — ACETAMINOPHEN 650 MG: 325 TABLET ORAL at 20:44

## 2022-02-11 RX ADMIN — ACETAMINOPHEN 650 MG: 325 TABLET ORAL at 14:07

## 2022-02-11 RX ADMIN — BUPRENORPHINE HYDROCHLORIDE AND NALOXONE HYDROCHLORIDE DIHYDRATE 1 TABLET: 8; 2 TABLET SUBLINGUAL at 08:28

## 2022-02-11 RX ADMIN — FLUTICASONE PROPIONATE 2 SPRAY: 50 SPRAY, METERED NASAL at 08:30

## 2022-02-11 RX ADMIN — ONDANSETRON 4 MG: 4 TABLET, ORALLY DISINTEGRATING ORAL at 14:07

## 2022-02-11 RX ADMIN — Medication 2000 UNITS: at 08:29

## 2022-02-11 RX ADMIN — IPRATROPIUM BROMIDE AND ALBUTEROL SULFATE 1 AMPULE: .5; 2.5 SOLUTION RESPIRATORY (INHALATION) at 14:43

## 2022-02-11 RX ADMIN — IPRATROPIUM BROMIDE AND ALBUTEROL SULFATE 1 AMPULE: .5; 2.5 SOLUTION RESPIRATORY (INHALATION) at 06:54

## 2022-02-11 RX ADMIN — BUPRENORPHINE HYDROCHLORIDE AND NALOXONE HYDROCHLORIDE DIHYDRATE 1 TABLET: 8; 2 TABLET SUBLINGUAL at 20:43

## 2022-02-11 RX ADMIN — Medication 250 MG: at 08:30

## 2022-02-11 RX ADMIN — Medication 10 ML: at 08:30

## 2022-02-11 RX ADMIN — BUPRENORPHINE HYDROCHLORIDE AND NALOXONE HYDROCHLORIDE DIHYDRATE 1 TABLET: 8; 2 TABLET SUBLINGUAL at 14:07

## 2022-02-11 RX ADMIN — Medication 10 ML: at 20:40

## 2022-02-11 RX ADMIN — ACETAMINOPHEN 650 MG: 325 TABLET ORAL at 06:20

## 2022-02-11 RX ADMIN — ACETYLCYSTEINE 400 MG: 100 SOLUTION ORAL; RESPIRATORY (INHALATION) at 18:34

## 2022-02-11 RX ADMIN — ONDANSETRON 4 MG: 2 INJECTION INTRAMUSCULAR; INTRAVENOUS at 08:27

## 2022-02-11 RX ADMIN — BUSPIRONE HYDROCHLORIDE 5 MG: 5 TABLET ORAL at 08:28

## 2022-02-11 RX ADMIN — IPRATROPIUM BROMIDE AND ALBUTEROL SULFATE 1 AMPULE: .5; 2.5 SOLUTION RESPIRATORY (INHALATION) at 18:34

## 2022-02-11 RX ADMIN — PYRIDOXINE HCL TAB 50 MG 50 MG: 50 TAB at 08:27

## 2022-02-11 RX ADMIN — ENOXAPARIN SODIUM 30 MG: 100 INJECTION SUBCUTANEOUS at 20:44

## 2022-02-11 RX ADMIN — METHYLPREDNISOLONE SODIUM SUCCINATE 40 MG: 40 INJECTION, POWDER, FOR SOLUTION INTRAMUSCULAR; INTRAVENOUS at 08:29

## 2022-02-11 ASSESSMENT — PAIN DESCRIPTION - PAIN TYPE
TYPE: ACUTE PAIN
TYPE: ACUTE PAIN

## 2022-02-11 ASSESSMENT — PAIN DESCRIPTION - DESCRIPTORS
DESCRIPTORS: ACHING
DESCRIPTORS: ACHING

## 2022-02-11 ASSESSMENT — PAIN DESCRIPTION - LOCATION
LOCATION: HEAD
LOCATION: HEAD

## 2022-02-11 ASSESSMENT — PAIN SCALES - GENERAL
PAINLEVEL_OUTOF10: 5
PAINLEVEL_OUTOF10: 8
PAINLEVEL_OUTOF10: 0
PAINLEVEL_OUTOF10: 6
PAINLEVEL_OUTOF10: 8
PAINLEVEL_OUTOF10: 8

## 2022-02-11 ASSESSMENT — PAIN DESCRIPTION - FREQUENCY: FREQUENCY: INTERMITTENT

## 2022-02-11 ASSESSMENT — PAIN - FUNCTIONAL ASSESSMENT: PAIN_FUNCTIONAL_ASSESSMENT: ACTIVITIES ARE NOT PREVENTED

## 2022-02-11 ASSESSMENT — PAIN DESCRIPTION - PROGRESSION: CLINICAL_PROGRESSION: NOT CHANGED

## 2022-02-11 ASSESSMENT — PAIN DESCRIPTION - ONSET: ONSET: GRADUAL

## 2022-02-11 NOTE — CARE COORDINATION
SOCIAL WORK / DISCHARGE PLANNING:  COVID positive 2/2. Sw spoke with pt via phone. She is asking if she could just regulate O2 at home. Sw explained that while ambulating she requires too much for home use. ( 15L)  But 4L at rest. Pt still has an O2 concentrator at home but no portable. It belongs to [facility] but considered lost due to lack of follow up, therefore new testing and script would be needed for arrangements prior to dc. [facility]  DME aware. Pt to dc home with granddtr, continues to deny need for HHC, and states family will provide home going transport.                  Electronically signed by NERY Jackson on 2/11/2022 at 3:22 PM

## 2022-02-11 NOTE — PROGRESS NOTES
Pulse ox was 94% on 4liters at rest.  Ambulated patient on 4 liters per nasal cannula. Oxygen saturation was 90% on 4 liters while ambulating.

## 2022-02-11 NOTE — PROGRESS NOTES
[address] [place], [place] [address]ist   Progress Note    Admitting Date and Time: 2/2/2022 11:15 AM  Admit Dx: Acute hypoxemic respiratory failure due to [facility]19 [facility] [U07.1, J96.01]    Subjective:    Pt states she feels ok but still has a headache. She states her breathing feels ok. She is asking about when she can go home. Per RN: will check walking pulse ox    ROS: denies fever, chills, cp, sob, n/v, HA unless stated above.      nicotine  1 patch TransDERmal Daily    melatonin  10 mg Oral Nightly    fluticasone  2 spray Each Nostril Daily    acetylcysteine  4 mL Inhalation BID    sodium chloride flush  5-40 mL IntraVENous 2 times per day    enoxaparin  30 mg SubCUTAneous BID    Vitamin D  2,000 Units Oral Daily    zinc sulfate  50 mg Oral Daily    vitamin B-6  50 mg Oral Daily    vitamin C  250 mg Oral Daily    ipratropium-albuterol  1 ampule Inhalation Q4H WA    busPIRone  5 mg Oral BID    guaiFENesin  1,200 mg Oral BID    mirtazapine  30 mg Oral Nightly    buprenorphine-naloxone  1 tablet SubLINGual TID    methylPREDNISolone  40 mg IntraVENous Daily     sodium chloride, 30 mL, PRN  sodium chloride flush, 5-40 mL, PRN  sodium chloride, 25 mL, PRN  ondansetron, 4 mg, Q8H PRN   Or  ondansetron, 4 mg, Q6H PRN  polyethylene glycol, 17 g, Daily PRN  acetaminophen, 650 mg, Q6H PRN   Or  acetaminophen, 650 mg, Q6H PRN         Objective:    BP (!) 99/53   Pulse 82   Temp 96.3 °F (35.7 °C) (Infrared)   Resp 20   Ht 5' 5\" (1.651 m)   Wt 115 lb 11.9 oz (52.5 kg)   SpO2 93%   BMI 19.26 kg/m²   General Appearance: alert and oriented to person, place and time and in no acute distress, thin  Skin: warm and dry  Head: normocephalic and atraumatic  Eyes: pupils equal, round, and reactive to light, extraocular eye movements intact, conjunctivae normal  Neck: neck supple and non tender without mass   Pulmonary/Chest:mild coarse breath sounds throughout (improving), slightly reduced air movement, no respiratory distress on 4L NC  Cardiovascular: normal rate, normal S1 and S2 and no carotid bruits  Abdomen: soft, non-tender, non-distended, normal bowel sounds, no masses or organomegaly  Extremities: no cyanosis, no clubbing and no edema  Neurologic: no cranial nerve deficit and speech normal      Recent Labs     02/10/22  0457      K 4.1   CL 99   CO2 27   BUN 26*   CREATININE 0.5   GLUCOSE 91   CALCIUM 9.7       Recent Labs     02/10/22  0457   ALKPHOS 75   PROT 6.4   LABALBU 3.1*   BILITOT <0.2   AST 25   ALT 31       Recent Labs     02/10/22  0457   WBC 14.7*   RBC 3.49*   HGB 11.2*   HCT 34.1   MCV 97.7   MCH 32.1   MCHC 32.8   RDW 13.4   *   MPV 9.7         Radiology:   CTA CHEST W CONTRAST   Final Result   1. No CT evidence of pulmonary embolism. 2.  Mild upper lobe centrilobular emphysema. 3.  Mild lower lobe bronchiectasis with soft tissue density within multiple   lower lobe bronchi posteriorly most compatible with mucous plugging. Focal   area of consolidation in the right lower lobe suggestive of pneumonia. Probable reactive hilar adenopathy bilaterally. 4.  7 mm noncalcified soft tissue pulmonary nodule in the peripheral left   lower lobe. RECOMMENDATIONS:   Follow-up noncontrast CT chest in 3-6 months recommended. Fleischner Society guidelines for follow-up and management of incidentally   detected pulmonary nodules:      Multiple Solid Nodules:      ~Nodule size equals 6-8 mm. In a low-risk patient, CT at 3-6 months, then   consider CT at 18-24 months. In a high-risk patient, CT at 3-6 months, then   CT at 18-24 months. Radiology 2017 http://pubs. rsna.org/doi/full/10.1148/radiol. 1593714466         XR CHEST PORTABLE   Final Result   No acute process. Assessment:  Active Problems:    Acute hypoxemic respiratory failure due to COVID-19 [facility])  Resolved Problems:    * No resolved hospital problems. *      Plan:  1.  Acute respiratory failure 2/2 COVID  -Patient currently on 4L NC, apparently has O2 at home from old admission and occasionally wears 3L  -CXR unremarkable, CTA showed no PE, mild lower lobe bronchiectasis consistent with mucous plugging and a 7mm pulmonary nodule in LLL  -Continue with solumedrol 40mg QD, today is day 10  -Received 5 doses of remdesivir  -Continue duonebs, incentive spirometer and PEP/flutter. Encourage proning  -Procal, strep/legionella negative  -Monitor O2 saturations, titrate O2 down as able     2. Mucous plugging  -Continue mucomyst and PEP/flutter  -Monitor, if any worsening in condition may need pulm consult     3. Pulmonary nodule  -7mm, noncalcified  -Patient is a smoker  -Recommendations per radiologist to follow up non-contrast CT chest in 3-6 months - discussed with patient.     4. Suboxone use  -Patient states she was previously addicted to pain pills but now has been on suboxone  -Obtained a prescription record with dosing (8-2mg TID) from [facility], so will resume home med     5. Anxiety/depression  -History established, home meds buspar 5mg BID and remeron 30mg QHS - will continue     6. CAD  -Patient states she has a stent placed about 10 years ago  -Obtained records that show patient had a stent placed in 2002 by Dr. Maik Guerrero  -Not currently on any statin or aspirin, will start these. 7.   Headache  -Patient reports headache last night and this morning  -Was given toradol, benadryl and reglan which helped  -Possible migraine as is with nausea and photophobia. May also be related to nicotine withdrawal, patient requested nicotine patch    NOTE: This report was transcribed using voice recognition software. Every effort was made to ensure accuracy; however, inadvertent computerized transcription errors may be present.      Electronically signed by Ghislaine Lynn MD on 2/11/2022 at 6:16 PM

## 2022-02-12 LAB
BASOPHILS ABSOLUTE: 0.02 E9/L (ref 0–0.2)
BASOPHILS RELATIVE PERCENT: 0.2 % (ref 0–2)
EOSINOPHILS ABSOLUTE: 0.07 E9/L (ref 0.05–0.5)
EOSINOPHILS RELATIVE PERCENT: 0.6 % (ref 0–6)
HCT VFR BLD CALC: 31.2 % (ref 34–48)
HEMOGLOBIN: 11.2 G/DL (ref 11.5–15.5)
IMMATURE GRANULOCYTES #: 0.14 E9/L
IMMATURE GRANULOCYTES %: 1.1 % (ref 0–5)
LYMPHOCYTES ABSOLUTE: 2.32 E9/L (ref 1.5–4)
LYMPHOCYTES RELATIVE PERCENT: 18.3 % (ref 20–42)
MCH RBC QN AUTO: 35.7 PG (ref 26–35)
MCHC RBC AUTO-ENTMCNC: 35.9 % (ref 32–34.5)
MCV RBC AUTO: 99.4 FL (ref 80–99.9)
MONOCYTES ABSOLUTE: 0.87 E9/L (ref 0.1–0.95)
MONOCYTES RELATIVE PERCENT: 6.9 % (ref 2–12)
NEUTROPHILS ABSOLUTE: 9.24 E9/L (ref 1.8–7.3)
NEUTROPHILS RELATIVE PERCENT: 72.9 % (ref 43–80)
PDW BLD-RTO: 13.6 FL (ref 11.5–15)
PLATELET # BLD: 482 E9/L (ref 130–450)
PMV BLD AUTO: 9.3 FL (ref 7–12)
RBC # BLD: 3.14 E12/L (ref 3.5–5.5)
WBC # BLD: 12.7 E9/L (ref 4.5–11.5)

## 2022-02-12 PROCEDURE — 2580000003 HC RX 258: Performed by: STUDENT IN AN ORGANIZED HEALTH CARE EDUCATION/TRAINING PROGRAM

## 2022-02-12 PROCEDURE — 6360000002 HC RX W HCPCS: Performed by: STUDENT IN AN ORGANIZED HEALTH CARE EDUCATION/TRAINING PROGRAM

## 2022-02-12 PROCEDURE — 6370000000 HC RX 637 (ALT 250 FOR IP): Performed by: STUDENT IN AN ORGANIZED HEALTH CARE EDUCATION/TRAINING PROGRAM

## 2022-02-12 PROCEDURE — 2060000000 HC ICU INTERMEDIATE R&B

## 2022-02-12 PROCEDURE — 36415 COLL VENOUS BLD VENIPUNCTURE: CPT

## 2022-02-12 PROCEDURE — 99232 SBSQ HOSP IP/OBS MODERATE 35: CPT | Performed by: STUDENT IN AN ORGANIZED HEALTH CARE EDUCATION/TRAINING PROGRAM

## 2022-02-12 PROCEDURE — 94640 AIRWAY INHALATION TREATMENT: CPT

## 2022-02-12 PROCEDURE — 2700000000 HC OXYGEN THERAPY PER DAY

## 2022-02-12 PROCEDURE — 6370000000 HC RX 637 (ALT 250 FOR IP): Performed by: FAMILY MEDICINE

## 2022-02-12 PROCEDURE — 85025 COMPLETE CBC W/AUTO DIFF WBC: CPT

## 2022-02-12 RX ORDER — MAGNESIUM SULFATE 1 G/100ML
1000 INJECTION INTRAVENOUS ONCE
Status: DISCONTINUED | OUTPATIENT
Start: 2022-02-12 | End: 2022-02-12

## 2022-02-12 RX ORDER — MAGNESIUM SULFATE 1 G/100ML
1000 INJECTION INTRAVENOUS ONCE
Status: COMPLETED | OUTPATIENT
Start: 2022-02-12 | End: 2022-02-12

## 2022-02-12 RX ORDER — METOCLOPRAMIDE 10 MG/1
10 TABLET ORAL ONCE
Status: COMPLETED | OUTPATIENT
Start: 2022-02-12 | End: 2022-02-12

## 2022-02-12 RX ORDER — ASPIRIN 81 MG/1
81 TABLET, CHEWABLE ORAL DAILY
Status: DISCONTINUED | OUTPATIENT
Start: 2022-02-12 | End: 2022-02-15 | Stop reason: HOSPADM

## 2022-02-12 RX ORDER — ATORVASTATIN CALCIUM 40 MG/1
40 TABLET, FILM COATED ORAL NIGHTLY
Status: DISCONTINUED | OUTPATIENT
Start: 2022-02-12 | End: 2022-02-15 | Stop reason: HOSPADM

## 2022-02-12 RX ORDER — ACETAMINOPHEN 325 MG/1
650 TABLET ORAL EVERY 8 HOURS PRN
Status: DISCONTINUED | OUTPATIENT
Start: 2022-02-12 | End: 2022-02-15 | Stop reason: HOSPADM

## 2022-02-12 RX ORDER — ACETAMINOPHEN 650 MG/1
650 SUPPOSITORY RECTAL EVERY 8 HOURS PRN
Status: DISCONTINUED | OUTPATIENT
Start: 2022-02-12 | End: 2022-02-15 | Stop reason: HOSPADM

## 2022-02-12 RX ORDER — DIPHENHYDRAMINE HCL 25 MG
25 TABLET ORAL ONCE
Status: COMPLETED | OUTPATIENT
Start: 2022-02-12 | End: 2022-02-12

## 2022-02-12 RX ORDER — BUDESONIDE 0.5 MG/2ML
0.5 INHALANT ORAL 2 TIMES DAILY
Status: DISCONTINUED | OUTPATIENT
Start: 2022-02-12 | End: 2022-02-15 | Stop reason: HOSPADM

## 2022-02-12 RX ORDER — DIPHENHYDRAMINE HCL 25 MG
25 TABLET ORAL EVERY 6 HOURS PRN
Status: DISCONTINUED | OUTPATIENT
Start: 2022-02-12 | End: 2022-02-12

## 2022-02-12 RX ORDER — METOCLOPRAMIDE 10 MG/1
10 TABLET ORAL
Status: DISCONTINUED | OUTPATIENT
Start: 2022-02-12 | End: 2022-02-12

## 2022-02-12 RX ADMIN — ACETYLCYSTEINE 400 MG: 100 SOLUTION ORAL; RESPIRATORY (INHALATION) at 18:33

## 2022-02-12 RX ADMIN — IPRATROPIUM BROMIDE AND ALBUTEROL SULFATE 1 AMPULE: .5; 2.5 SOLUTION RESPIRATORY (INHALATION) at 10:00

## 2022-02-12 RX ADMIN — ATORVASTATIN CALCIUM 40 MG: 40 TABLET, FILM COATED ORAL at 20:49

## 2022-02-12 RX ADMIN — ENOXAPARIN SODIUM 30 MG: 100 INJECTION SUBCUTANEOUS at 20:49

## 2022-02-12 RX ADMIN — DIPHENHYDRAMINE HCL 25 MG: 25 TABLET ORAL at 17:48

## 2022-02-12 RX ADMIN — MAGNESIUM SULFATE HEPTAHYDRATE 1000 MG: 1 INJECTION, SOLUTION INTRAVENOUS at 17:48

## 2022-02-12 RX ADMIN — Medication 10 ML: at 20:50

## 2022-02-12 RX ADMIN — ASPIRIN 81 MG 81 MG: 81 TABLET ORAL at 14:13

## 2022-02-12 RX ADMIN — BUPRENORPHINE HYDROCHLORIDE AND NALOXONE HYDROCHLORIDE DIHYDRATE 1 TABLET: 8; 2 TABLET SUBLINGUAL at 20:49

## 2022-02-12 RX ADMIN — ACETYLCYSTEINE 400 MG: 100 SOLUTION ORAL; RESPIRATORY (INHALATION) at 06:29

## 2022-02-12 RX ADMIN — IPRATROPIUM BROMIDE AND ALBUTEROL SULFATE 1 AMPULE: .5; 2.5 SOLUTION RESPIRATORY (INHALATION) at 06:29

## 2022-02-12 RX ADMIN — Medication 250 MG: at 08:44

## 2022-02-12 RX ADMIN — ACETAMINOPHEN 650 MG: 325 TABLET ORAL at 03:44

## 2022-02-12 RX ADMIN — BUPRENORPHINE HYDROCHLORIDE AND NALOXONE HYDROCHLORIDE DIHYDRATE 1 TABLET: 8; 2 TABLET SUBLINGUAL at 08:44

## 2022-02-12 RX ADMIN — GUAIFENESIN 1200 MG: 600 TABLET, EXTENDED RELEASE ORAL at 08:44

## 2022-02-12 RX ADMIN — FLUTICASONE PROPIONATE 2 SPRAY: 50 SPRAY, METERED NASAL at 10:57

## 2022-02-12 RX ADMIN — METOCLOPRAMIDE 10 MG: 10 TABLET ORAL at 17:48

## 2022-02-12 RX ADMIN — ACETAMINOPHEN 650 MG: 325 TABLET ORAL at 10:52

## 2022-02-12 RX ADMIN — BUPRENORPHINE HYDROCHLORIDE AND NALOXONE HYDROCHLORIDE DIHYDRATE 1 TABLET: 8; 2 TABLET SUBLINGUAL at 14:13

## 2022-02-12 RX ADMIN — BUSPIRONE HYDROCHLORIDE 5 MG: 5 TABLET ORAL at 08:44

## 2022-02-12 RX ADMIN — METHYLPREDNISOLONE SODIUM SUCCINATE 40 MG: 40 INJECTION, POWDER, FOR SOLUTION INTRAMUSCULAR; INTRAVENOUS at 08:47

## 2022-02-12 RX ADMIN — Medication 10 MG: at 20:49

## 2022-02-12 RX ADMIN — IPRATROPIUM BROMIDE AND ALBUTEROL SULFATE 1 AMPULE: .5; 2.5 SOLUTION RESPIRATORY (INHALATION) at 18:33

## 2022-02-12 RX ADMIN — ENOXAPARIN SODIUM 30 MG: 100 INJECTION SUBCUTANEOUS at 08:43

## 2022-02-12 RX ADMIN — ONDANSETRON 4 MG: 4 TABLET, ORALLY DISINTEGRATING ORAL at 03:44

## 2022-02-12 RX ADMIN — BUDESONIDE 500 MCG: 0.5 INHALANT RESPIRATORY (INHALATION) at 18:33

## 2022-02-12 RX ADMIN — Medication 10 ML: at 08:47

## 2022-02-12 RX ADMIN — BUSPIRONE HYDROCHLORIDE 5 MG: 5 TABLET ORAL at 20:49

## 2022-02-12 RX ADMIN — Medication 2000 UNITS: at 08:44

## 2022-02-12 RX ADMIN — GUAIFENESIN 1200 MG: 600 TABLET, EXTENDED RELEASE ORAL at 20:49

## 2022-02-12 RX ADMIN — PYRIDOXINE HCL TAB 50 MG 50 MG: 50 TAB at 08:44

## 2022-02-12 RX ADMIN — MIRTAZAPINE 30 MG: 15 TABLET, FILM COATED ORAL at 21:00

## 2022-02-12 RX ADMIN — ZINC SULFATE 220 MG (50 MG) CAPSULE 50 MG: CAPSULE at 08:44

## 2022-02-12 ASSESSMENT — PAIN SCALES - GENERAL
PAINLEVEL_OUTOF10: 6
PAINLEVEL_OUTOF10: 5
PAINLEVEL_OUTOF10: 7
PAINLEVEL_OUTOF10: 6

## 2022-02-12 ASSESSMENT — PAIN DESCRIPTION - LOCATION: LOCATION: HAND

## 2022-02-12 NOTE — PROGRESS NOTES
Pulse ox was__86__% on room air at rest.  Ambulated patient on room air. Oxygen applied. Recovery pulse ox was ___82__% on __8___liters of oxygen while ambulating.

## 2022-02-12 NOTE — PROGRESS NOTES
[address] [place], [place] [address]ist   Progress Note    Admitting Date and Time: 2/2/2022 11:15 AM  Admit Dx: Acute hypoxemic respiratory failure [facility] [U07.1, J96.01]    Subjective:    Pt states she feels pretty good other than her headache. She states her breathing feels fine and her cough is just her usual \"smoker's cough\". Per RN: will check walking pulse ox again    ROS: denies fever, chills, cp, sob, n/v, HA unless stated above.      budesonide  0.5 mg Nebulization BID    atorvastatin  40 mg Oral Nightly    aspirin  81 mg Oral Daily    nicotine  1 patch TransDERmal Daily    melatonin  10 mg Oral Nightly    fluticasone  2 spray Each Nostril Daily    acetylcysteine  4 mL Inhalation BID    sodium chloride flush  5-40 mL IntraVENous 2 times per day    enoxaparin  30 mg SubCUTAneous BID    Vitamin D  2,000 Units Oral Daily    zinc sulfate  50 mg Oral Daily    vitamin B-6  50 mg Oral Daily    vitamin C  250 mg Oral Daily    ipratropium-albuterol  1 ampule Inhalation Q4H WA    busPIRone  5 mg Oral BID    guaiFENesin  1,200 mg Oral BID    mirtazapine  30 mg Oral Nightly    buprenorphine-naloxone  1 tablet SubLINGual TID    methylPREDNISolone  40 mg IntraVENous Daily     sodium chloride, 30 mL, PRN  sodium chloride flush, 5-40 mL, PRN  sodium chloride, 25 mL, PRN  ondansetron, 4 mg, Q8H PRN   Or  ondansetron, 4 mg, Q6H PRN  polyethylene glycol, 17 g, Daily PRN  acetaminophen, 650 mg, Q6H PRN   Or  acetaminophen, 650 mg, Q6H PRN         Objective:    BP (!) 100/49   Pulse 68   Temp 97.5 °F (36.4 °C) (Infrared)   Resp 16   Ht 5' 5\" (1.651 m)   Wt 115 lb 11.9 oz (52.5 kg)   SpO2 94%   BMI 19.26 kg/m²   General Appearance: alert and oriented to person, place and time and in no acute distress, thin  Skin: warm and dry  Head: normocephalic and atraumatic  Eyes: pupils equal, round, and reactive to light, extraocular eye movements intact, conjunctivae normal  Neck: neck supple and non tender without mass   Pulmonary/Chest:mild coarse breath sounds throughout (improving), slightly reduced air movement, no respiratory distress on 4L NC  Cardiovascular: normal rate, normal S1 and S2 and no carotid bruits  Abdomen: soft, non-tender, non-distended, normal bowel sounds, no masses or organomegaly  Extremities: no cyanosis, no clubbing and no edema  Neurologic: no cranial nerve deficit and speech normal      Recent Labs     02/10/22  0457      K 4.1   CL 99   CO2 27   BUN 26*   CREATININE 0.5   GLUCOSE 91   CALCIUM 9.7       Recent Labs     02/10/22  0457   ALKPHOS 75   PROT 6.4   LABALBU 3.1*   BILITOT <0.2   AST 25   ALT 31       Recent Labs     02/10/22  0457 02/12/22  0557   WBC 14.7* 12.7*   RBC 3.49* 3.14*   HGB 11.2* 11.2*   HCT 34.1 31.2*   MCV 97.7 99.4   MCH 32.1 35.7*   MCHC 32.8 35.9*   RDW 13.4 13.6   * 482*   MPV 9.7 9.3         Radiology:   CTA CHEST W CONTRAST   Final Result   1. No CT evidence of pulmonary embolism. 2.  Mild upper lobe centrilobular emphysema. 3.  Mild lower lobe bronchiectasis with soft tissue density within multiple   lower lobe bronchi posteriorly most compatible with mucous plugging. Focal   area of consolidation in the right lower lobe suggestive of pneumonia. Probable reactive hilar adenopathy bilaterally. 4.  7 mm noncalcified soft tissue pulmonary nodule in the peripheral left   lower lobe. RECOMMENDATIONS:   Follow-up noncontrast CT chest in 3-6 months recommended. Fleischner Society guidelines for follow-up and management of incidentally   detected pulmonary nodules:      Multiple Solid Nodules:      ~Nodule size equals 6-8 mm. In a low-risk patient, CT at 3-6 months, then   consider CT at 18-24 months. In a high-risk patient, CT at 3-6 months, then   CT at 18-24 months. Radiology 2017 http://pubs. rsna.org/doi/full/10.1148/radiol. 8412693232         XR CHEST PORTABLE   Final Result   No acute process. Assessment:  Active Problems:    Acute hypoxemic respiratory failure due to COVID-19 [facility])  Resolved Problems:    * No resolved hospital problems. *      Plan:  1. Acute respiratory failure 2/2 COVID  -Patient currently on 4L NC, apparently has O2 at home from old admission and occasionally wears 3L  -CXR unremarkable, CTA showed no PE, mild lower lobe bronchiectasis consistent with mucous plugging and a 7mm pulmonary nodule in LLL  -Continue with solumedrol 40mg QD, today is day 11  -Received 5 doses of remdesivir  -Continue duonebs, incentive spirometer and PEP/flutter. Encourage proning  -Patient still failing walk test, not improving. Will add on pulmicort nebulizer to see if helps. -Will also add PT/OT to try to improve stamina with walking  -Procal, strep/legionella negative  -Monitor O2 saturations, titrate O2 down as able     2. Mucous plugging  -Continue mucomyst and PEP/flutter  -Monitor, if any worsening in condition may need pulm consult     3. Pulmonary nodule  -7mm, noncalcified  -Patient is a smoker  -Recommendations per radiologist to follow up non-contrast CT chest in 3-6 months - discussed with patient.     4. Suboxone use  -Patient states she was previously addicted to pain pills but now has been on suboxone  -Obtained a prescription record with dosing (8-2mg TID) from [facility], so will resume home med     5. Anxiety/depression  -History established, home meds buspar 5mg BID and remeron 30mg QHS - will continue     6. CAD  -Patient states she has a stent placed about 10 years ago  -Obtained records that show patient had a stent placed in 2002 by Dr. Ac Dahl  -Not currently on any statin or aspirin, will start these. 7.   Headache  -Patient reports headache last night and this morning  -Was given toradol, benadryl and reglan which helped  -Possible migraine as is with nausea and photophobia.  May also be related to nicotine withdrawal, patient requested nicotine patch    NOTE: This report was transcribed using voice recognition software. Every effort was made to ensure accuracy; however, inadvertent computerized transcription errors may be present.      Electronically signed by Charisma Renae MD on 2/12/2022 at 12:44 PM

## 2022-02-13 ENCOUNTER — APPOINTMENT (OUTPATIENT)
Dept: GENERAL RADIOLOGY | Age: 72
DRG: 137 | End: 2022-02-13
Payer: COMMERCIAL

## 2022-02-13 ENCOUNTER — APPOINTMENT (OUTPATIENT)
Dept: ULTRASOUND IMAGING | Age: 72
DRG: 137 | End: 2022-02-13
Payer: COMMERCIAL

## 2022-02-13 LAB
ANION GAP SERPL CALCULATED.3IONS-SCNC: 12 MMOL/L (ref 7–16)
B.E.: 6.5 MMOL/L (ref -3–3)
BASOPHILS ABSOLUTE: 0 E9/L (ref 0–0.2)
BASOPHILS RELATIVE PERCENT: 0.3 % (ref 0–2)
BUN BLDV-MCNC: 23 MG/DL (ref 6–23)
CALCIUM SERPL-MCNC: 10.4 MG/DL (ref 8.6–10.2)
CHLORIDE BLD-SCNC: 99 MMOL/L (ref 98–107)
CO2: 29 MMOL/L (ref 22–29)
COHB: 0.3 % (ref 0–1.5)
CREAT SERPL-MCNC: 0.5 MG/DL (ref 0.5–1)
CRITICAL: ABNORMAL
DATE ANALYZED: ABNORMAL
DATE OF COLLECTION: ABNORMAL
EOSINOPHILS ABSOLUTE: 0 E9/L (ref 0.05–0.5)
EOSINOPHILS RELATIVE PERCENT: 0.1 % (ref 0–6)
GFR AFRICAN AMERICAN: >60
GFR NON-AFRICAN AMERICAN: >60 ML/MIN/1.73
GLUCOSE BLD-MCNC: 117 MG/DL (ref 74–99)
HCO3: 30.7 MMOL/L (ref 22–26)
HCT VFR BLD CALC: 37.3 % (ref 34–48)
HEMOGLOBIN: 12 G/DL (ref 11.5–15.5)
HHB: 5.4 % (ref 0–5)
LAB: ABNORMAL
LYMPHOCYTES ABSOLUTE: 0.94 E9/L (ref 1.5–4)
LYMPHOCYTES RELATIVE PERCENT: 4.3 % (ref 20–42)
Lab: ABNORMAL
MCH RBC QN AUTO: 30.5 PG (ref 26–35)
MCHC RBC AUTO-ENTMCNC: 32.2 % (ref 32–34.5)
MCV RBC AUTO: 94.7 FL (ref 80–99.9)
METHB: 0.2 % (ref 0–1.5)
MODE: ABNORMAL
MONOCYTES ABSOLUTE: 0.47 E9/L (ref 0.1–0.95)
MONOCYTES RELATIVE PERCENT: 1.7 % (ref 2–12)
NEUTROPHILS ABSOLUTE: 22.09 E9/L (ref 1.8–7.3)
NEUTROPHILS RELATIVE PERCENT: 93.9 % (ref 43–80)
O2 CONTENT: 16.7 ML/DL
O2 SATURATION: 94.6 % (ref 92–98.5)
O2HB: 94.1 % (ref 94–97)
OPERATOR ID: 8217
OVALOCYTES: ABNORMAL
PATIENT TEMP: 37 C
PCO2: 42.4 MMHG (ref 35–45)
PDW BLD-RTO: 13.3 FL (ref 11.5–15)
PH BLOOD GAS: 7.48 (ref 7.35–7.45)
PLATELET # BLD: 540 E9/L (ref 130–450)
PMV BLD AUTO: 8.9 FL (ref 7–12)
PO2: 70.9 MMHG (ref 75–100)
POIKILOCYTES: ABNORMAL
POLYCHROMASIA: ABNORMAL
POTASSIUM SERPL-SCNC: 4.2 MMOL/L (ref 3.5–5)
PROCALCITONIN: 0.04 NG/ML (ref 0–0.08)
RBC # BLD: 3.94 E12/L (ref 3.5–5.5)
SODIUM BLD-SCNC: 140 MMOL/L (ref 132–146)
SOURCE, BLOOD GAS: ABNORMAL
THB: 12.6 G/DL (ref 11.5–16.5)
TIME ANALYZED: 1033
WBC # BLD: 23.5 E9/L (ref 4.5–11.5)

## 2022-02-13 PROCEDURE — 6360000002 HC RX W HCPCS: Performed by: STUDENT IN AN ORGANIZED HEALTH CARE EDUCATION/TRAINING PROGRAM

## 2022-02-13 PROCEDURE — 6370000000 HC RX 637 (ALT 250 FOR IP): Performed by: FAMILY MEDICINE

## 2022-02-13 PROCEDURE — 6370000000 HC RX 637 (ALT 250 FOR IP): Performed by: STUDENT IN AN ORGANIZED HEALTH CARE EDUCATION/TRAINING PROGRAM

## 2022-02-13 PROCEDURE — 82805 BLOOD GASES W/O2 SATURATION: CPT

## 2022-02-13 PROCEDURE — 87070 CULTURE OTHR SPECIMN AEROBIC: CPT

## 2022-02-13 PROCEDURE — 2700000000 HC OXYGEN THERAPY PER DAY

## 2022-02-13 PROCEDURE — 36600 WITHDRAWAL OF ARTERIAL BLOOD: CPT

## 2022-02-13 PROCEDURE — 76642 ULTRASOUND BREAST LIMITED: CPT

## 2022-02-13 PROCEDURE — 80048 BASIC METABOLIC PNL TOTAL CA: CPT

## 2022-02-13 PROCEDURE — 2580000003 HC RX 258: Performed by: STUDENT IN AN ORGANIZED HEALTH CARE EDUCATION/TRAINING PROGRAM

## 2022-02-13 PROCEDURE — 94640 AIRWAY INHALATION TREATMENT: CPT

## 2022-02-13 PROCEDURE — 2060000000 HC ICU INTERMEDIATE R&B

## 2022-02-13 PROCEDURE — 2500000003 HC RX 250 WO HCPCS: Performed by: STUDENT IN AN ORGANIZED HEALTH CARE EDUCATION/TRAINING PROGRAM

## 2022-02-13 PROCEDURE — 71045 X-RAY EXAM CHEST 1 VIEW: CPT

## 2022-02-13 PROCEDURE — 36415 COLL VENOUS BLD VENIPUNCTURE: CPT

## 2022-02-13 PROCEDURE — 99233 SBSQ HOSP IP/OBS HIGH 50: CPT | Performed by: STUDENT IN AN ORGANIZED HEALTH CARE EDUCATION/TRAINING PROGRAM

## 2022-02-13 PROCEDURE — 87206 SMEAR FLUORESCENT/ACID STAI: CPT

## 2022-02-13 PROCEDURE — 85025 COMPLETE CBC W/AUTO DIFF WBC: CPT

## 2022-02-13 PROCEDURE — 87040 BLOOD CULTURE FOR BACTERIA: CPT

## 2022-02-13 PROCEDURE — 84145 PROCALCITONIN (PCT): CPT

## 2022-02-13 RX ORDER — HYDROXYZINE HYDROCHLORIDE 10 MG/1
10 TABLET, FILM COATED ORAL 2 TIMES DAILY PRN
Status: DISCONTINUED | OUTPATIENT
Start: 2022-02-13 | End: 2022-02-15 | Stop reason: HOSPADM

## 2022-02-13 RX ORDER — BUSPIRONE HYDROCHLORIDE 5 MG/1
5 TABLET ORAL 3 TIMES DAILY
Status: DISCONTINUED | OUTPATIENT
Start: 2022-02-13 | End: 2022-02-15 | Stop reason: HOSPADM

## 2022-02-13 RX ADMIN — ASPIRIN 81 MG 81 MG: 81 TABLET ORAL at 09:12

## 2022-02-13 RX ADMIN — GUAIFENESIN 1200 MG: 600 TABLET, EXTENDED RELEASE ORAL at 21:08

## 2022-02-13 RX ADMIN — ATORVASTATIN CALCIUM 40 MG: 40 TABLET, FILM COATED ORAL at 20:58

## 2022-02-13 RX ADMIN — BUPRENORPHINE HYDROCHLORIDE AND NALOXONE HYDROCHLORIDE DIHYDRATE 1 TABLET: 8; 2 TABLET SUBLINGUAL at 09:12

## 2022-02-13 RX ADMIN — ACETYLCYSTEINE 400 MG: 100 SOLUTION ORAL; RESPIRATORY (INHALATION) at 17:42

## 2022-02-13 RX ADMIN — BUPRENORPHINE HYDROCHLORIDE AND NALOXONE HYDROCHLORIDE DIHYDRATE 1 TABLET: 8; 2 TABLET SUBLINGUAL at 15:16

## 2022-02-13 RX ADMIN — DOXYCYCLINE 100 MG: 100 INJECTION, POWDER, LYOPHILIZED, FOR SOLUTION INTRAVENOUS at 15:16

## 2022-02-13 RX ADMIN — BUDESONIDE 500 MCG: 0.5 INHALANT RESPIRATORY (INHALATION) at 06:17

## 2022-02-13 RX ADMIN — Medication 250 MG: at 09:12

## 2022-02-13 RX ADMIN — BUSPIRONE HYDROCHLORIDE 5 MG: 5 TABLET ORAL at 09:13

## 2022-02-13 RX ADMIN — ZINC SULFATE 220 MG (50 MG) CAPSULE 50 MG: CAPSULE at 09:12

## 2022-02-13 RX ADMIN — ACETAMINOPHEN 650 MG: 325 TABLET ORAL at 01:42

## 2022-02-13 RX ADMIN — ONDANSETRON 4 MG: 4 TABLET, ORALLY DISINTEGRATING ORAL at 20:59

## 2022-02-13 RX ADMIN — IPRATROPIUM BROMIDE AND ALBUTEROL SULFATE 1 AMPULE: .5; 2.5 SOLUTION RESPIRATORY (INHALATION) at 06:17

## 2022-02-13 RX ADMIN — IPRATROPIUM BROMIDE AND ALBUTEROL SULFATE 1 AMPULE: .5; 2.5 SOLUTION RESPIRATORY (INHALATION) at 17:42

## 2022-02-13 RX ADMIN — PYRIDOXINE HCL TAB 50 MG 50 MG: 50 TAB at 09:12

## 2022-02-13 RX ADMIN — BUPRENORPHINE HYDROCHLORIDE AND NALOXONE HYDROCHLORIDE DIHYDRATE 1 TABLET: 8; 2 TABLET SUBLINGUAL at 20:58

## 2022-02-13 RX ADMIN — ENOXAPARIN SODIUM 30 MG: 100 INJECTION SUBCUTANEOUS at 20:58

## 2022-02-13 RX ADMIN — FLUTICASONE PROPIONATE 2 SPRAY: 50 SPRAY, METERED NASAL at 09:13

## 2022-02-13 RX ADMIN — ACETYLCYSTEINE 400 MG: 100 SOLUTION ORAL; RESPIRATORY (INHALATION) at 06:16

## 2022-02-13 RX ADMIN — ONDANSETRON 4 MG: 2 INJECTION INTRAMUSCULAR; INTRAVENOUS at 01:40

## 2022-02-13 RX ADMIN — WATER 1000 MG: 1 INJECTION INTRAMUSCULAR; INTRAVENOUS; SUBCUTANEOUS at 12:45

## 2022-02-13 RX ADMIN — ACETAMINOPHEN 650 MG: 325 TABLET ORAL at 20:59

## 2022-02-13 RX ADMIN — METHYLPREDNISOLONE SODIUM SUCCINATE 40 MG: 40 INJECTION, POWDER, FOR SOLUTION INTRAMUSCULAR; INTRAVENOUS at 09:12

## 2022-02-13 RX ADMIN — ENOXAPARIN SODIUM 30 MG: 100 INJECTION SUBCUTANEOUS at 09:12

## 2022-02-13 RX ADMIN — GUAIFENESIN 1200 MG: 600 TABLET, EXTENDED RELEASE ORAL at 09:12

## 2022-02-13 RX ADMIN — BUSPIRONE HYDROCHLORIDE 5 MG: 5 TABLET ORAL at 15:16

## 2022-02-13 RX ADMIN — ACETAMINOPHEN 650 MG: 325 TABLET ORAL at 10:25

## 2022-02-13 RX ADMIN — MIRTAZAPINE 30 MG: 15 TABLET, FILM COATED ORAL at 20:58

## 2022-02-13 RX ADMIN — Medication 10 ML: at 09:13

## 2022-02-13 RX ADMIN — ONDANSETRON 4 MG: 4 TABLET, ORALLY DISINTEGRATING ORAL at 10:25

## 2022-02-13 RX ADMIN — Medication 10 MG: at 20:59

## 2022-02-13 RX ADMIN — Medication 10 ML: at 21:08

## 2022-02-13 RX ADMIN — IPRATROPIUM BROMIDE AND ALBUTEROL SULFATE 1 AMPULE: .5; 2.5 SOLUTION RESPIRATORY (INHALATION) at 09:38

## 2022-02-13 RX ADMIN — Medication 2000 UNITS: at 09:12

## 2022-02-13 RX ADMIN — BUSPIRONE HYDROCHLORIDE 5 MG: 5 TABLET ORAL at 20:59

## 2022-02-13 ASSESSMENT — PAIN DESCRIPTION - LOCATION: LOCATION: HEAD

## 2022-02-13 ASSESSMENT — PAIN SCALES - GENERAL
PAINLEVEL_OUTOF10: 3
PAINLEVEL_OUTOF10: 3
PAINLEVEL_OUTOF10: 8
PAINLEVEL_OUTOF10: 7
PAINLEVEL_OUTOF10: 5

## 2022-02-13 ASSESSMENT — PAIN DESCRIPTION - PAIN TYPE: TYPE: ACUTE PAIN

## 2022-02-13 NOTE — PROGRESS NOTES
[address] [place], [place] [address]ist   Progress Note    Admitting Date and Time: 2/2/2022 11:15 AM  Admit Dx: Acute hypoxemic respiratory failure due to COVID-19 ([facility] [U07.1, J96.01]    Subjective:    Pt states she feels fine other than having some anxiety. She does not feel her breathing is anymore difficult than usual and denies any more coughing than usual. She also denies any fever/chills. Per RN: patient complaining of anxiety    ROS: denies fever, chills, cp, sob, n/v, HA unless stated above.      busPIRone  5 mg Oral TID    cefTRIAXone (ROCEPHIN) IV  1,000 mg IntraVENous Q24H    doxycycline (VIBRAMYCIN) IV  100 mg IntraVENous Q12H    budesonide  0.5 mg Nebulization BID    atorvastatin  40 mg Oral Nightly    aspirin  81 mg Oral Daily    nicotine  1 patch TransDERmal Daily    melatonin  10 mg Oral Nightly    fluticasone  2 spray Each Nostril Daily    acetylcysteine  4 mL Inhalation BID    sodium chloride flush  5-40 mL IntraVENous 2 times per day    enoxaparin  30 mg SubCUTAneous BID    Vitamin D  2,000 Units Oral Daily    zinc sulfate  50 mg Oral Daily    vitamin B-6  50 mg Oral Daily    vitamin C  250 mg Oral Daily    ipratropium-albuterol  1 ampule Inhalation Q4H WA    guaiFENesin  1,200 mg Oral BID    mirtazapine  30 mg Oral Nightly    buprenorphine-naloxone  1 tablet SubLINGual TID    methylPREDNISolone  40 mg IntraVENous Daily     acetaminophen, 650 mg, Q8H PRN   Or  acetaminophen, 650 mg, Q8H PRN  sodium chloride, 30 mL, PRN  sodium chloride flush, 5-40 mL, PRN  sodium chloride, 25 mL, PRN  ondansetron, 4 mg, Q8H PRN   Or  ondansetron, 4 mg, Q6H PRN  polyethylene glycol, 17 g, Daily PRN         Objective:    BP (!) 107/49   Pulse 82   Temp 98.1 °F (36.7 °C) (Infrared)   Resp 21   Ht 5' 5\" (1.651 m)   Wt 115 lb 11.9 oz (52.5 kg)   SpO2 91%   BMI 19.26 kg/m²   General Appearance: alert and oriented to person, place and time and in no acute distress, thin  Skin: warm and months. In a high-risk patient, CT at 3-6 months, then   CT at 18-24 months. Radiology 2017 http://pubs. rsna.org/doi/full/10.1148/radiol. 7185423183         XR CHEST PORTABLE   Final Result   No acute process. XR CHEST PORTABLE    (Results Pending)       Assessment:  Active Problems:    Acute hypoxemic respiratory failure due to COVID-19 [facility])  Resolved Problems:    * No resolved hospital problems. *      Plan:  1. Acute respiratory failure 2/2 COVID  -CXR unremarkable, CTA showed no PE, mild lower lobe bronchiectasis consistent with mucous plugging and a 7mm pulmonary nodule in LLL  -Continue with solumedrol 40mg QD, today is day 12   -Received 5 doses of remdesivir  -Continue duonebs, incentive spirometer and PEP/flutter. Encourage proning  -Patient still failing walk test, not improving. Will add on pulmicort nebulizer to see if helps. -Will also add PT/OT to try to improve stamina with walking  -Procal, strep/legionella negative  -Oxygen requirement increased to 10L this morning. Will repeat CXR, procal, CBC and ABG. -Monitor O2 saturations, titrate O2 down as able     2. Mucous plugging  -Continue mucomyst and PEP/flutter  -Monitor, if any worsening in condition may need pulm consult     3. Pulmonary nodule  -7mm, noncalcified  -Patient is a smoker  -Recommendations per radiologist to follow up non-contrast CT chest in 3-6 months - discussed with patient.     4. Suboxone use  -Patient states she was previously addicted to pain pills but now has been on suboxone  -Obtained a prescription record with dosing (8-2mg TID) from [facility], so will resume home med     5. Anxiety/depression  -History established, home meds buspar 5mg BID and remeron 30mg QHS - will continue  -Increasing anxiety so will increase buspar to 5mg TID and add PRN hydroxyzine     6.  CAD  -Patient states she has a stent placed about 10 years ago  -Obtained records that show patient had a stent placed in 2002 by  [place]  -Started aspirin and statin while here    7. Headache  -Patient reports headache last night and this morning  -Was given toradol, benadryl and reglan which helped  -Possible migraine as is with nausea and photophobia. May also be related to nicotine withdrawal, patient requested nicotine patch  -Gave magnesium, benadryl and reglan yesterday which helped - will give again if needed    8.   R axillary nodule  -About 1-2cm in size, patient states is not painful. States it has been there for a couple years but has been getting bigger. Patient is not up to date on mammograms (last was 2014 which was negative), advised to follow up with PCP to do so  -Will ultrasound nodule while here in hospital    NOTE: This report was transcribed using voice recognition software. Every effort was made to ensure accuracy; however, inadvertent computerized transcription errors may be present.      Electronically signed by Arianne Mirza MD on 2/13/2022 at 12:31 PM

## 2022-02-14 ENCOUNTER — APPOINTMENT (OUTPATIENT)
Dept: CT IMAGING | Age: 72
DRG: 137 | End: 2022-02-14
Payer: COMMERCIAL

## 2022-02-14 LAB
BASOPHILS ABSOLUTE: 0.03 E9/L (ref 0–0.2)
BASOPHILS RELATIVE PERCENT: 0.2 % (ref 0–2)
EOSINOPHILS ABSOLUTE: 0.03 E9/L (ref 0.05–0.5)
EOSINOPHILS RELATIVE PERCENT: 0.2 % (ref 0–6)
HCT VFR BLD CALC: 33.3 % (ref 34–48)
HEMOGLOBIN: 10.9 G/DL (ref 11.5–15.5)
IMMATURE GRANULOCYTES #: 0.26 E9/L
IMMATURE GRANULOCYTES %: 1.7 % (ref 0–5)
LYMPHOCYTES ABSOLUTE: 2.37 E9/L (ref 1.5–4)
LYMPHOCYTES RELATIVE PERCENT: 15.2 % (ref 20–42)
MCH RBC QN AUTO: 31.1 PG (ref 26–35)
MCHC RBC AUTO-ENTMCNC: 32.7 % (ref 32–34.5)
MCV RBC AUTO: 95.1 FL (ref 80–99.9)
MONOCYTES ABSOLUTE: 0.88 E9/L (ref 0.1–0.95)
MONOCYTES RELATIVE PERCENT: 5.6 % (ref 2–12)
NEUTROPHILS ABSOLUTE: 12.05 E9/L (ref 1.8–7.3)
NEUTROPHILS RELATIVE PERCENT: 77.1 % (ref 43–80)
PDW BLD-RTO: 13.3 FL (ref 11.5–15)
PLATELET # BLD: 506 E9/L (ref 130–450)
PMV BLD AUTO: 8.9 FL (ref 7–12)
PROCALCITONIN: 0.04 NG/ML (ref 0–0.08)
RBC # BLD: 3.5 E12/L (ref 3.5–5.5)
WBC # BLD: 15.6 E9/L (ref 4.5–11.5)

## 2022-02-14 PROCEDURE — 6370000000 HC RX 637 (ALT 250 FOR IP): Performed by: FAMILY MEDICINE

## 2022-02-14 PROCEDURE — 84145 PROCALCITONIN (PCT): CPT

## 2022-02-14 PROCEDURE — 2060000000 HC ICU INTERMEDIATE R&B

## 2022-02-14 PROCEDURE — 2700000000 HC OXYGEN THERAPY PER DAY

## 2022-02-14 PROCEDURE — 6370000000 HC RX 637 (ALT 250 FOR IP): Performed by: STUDENT IN AN ORGANIZED HEALTH CARE EDUCATION/TRAINING PROGRAM

## 2022-02-14 PROCEDURE — 2500000003 HC RX 250 WO HCPCS: Performed by: STUDENT IN AN ORGANIZED HEALTH CARE EDUCATION/TRAINING PROGRAM

## 2022-02-14 PROCEDURE — 97116 GAIT TRAINING THERAPY: CPT | Performed by: PHYSICAL THERAPIST

## 2022-02-14 PROCEDURE — 2580000003 HC RX 258: Performed by: STUDENT IN AN ORGANIZED HEALTH CARE EDUCATION/TRAINING PROGRAM

## 2022-02-14 PROCEDURE — 6360000002 HC RX W HCPCS: Performed by: STUDENT IN AN ORGANIZED HEALTH CARE EDUCATION/TRAINING PROGRAM

## 2022-02-14 PROCEDURE — 97165 OT EVAL LOW COMPLEX 30 MIN: CPT | Performed by: OCCUPATIONAL THERAPIST

## 2022-02-14 PROCEDURE — 70450 CT HEAD/BRAIN W/O DYE: CPT

## 2022-02-14 PROCEDURE — 94669 MECHANICAL CHEST WALL OSCILL: CPT

## 2022-02-14 PROCEDURE — 99233 SBSQ HOSP IP/OBS HIGH 50: CPT | Performed by: STUDENT IN AN ORGANIZED HEALTH CARE EDUCATION/TRAINING PROGRAM

## 2022-02-14 PROCEDURE — 85025 COMPLETE CBC W/AUTO DIFF WBC: CPT

## 2022-02-14 PROCEDURE — 97530 THERAPEUTIC ACTIVITIES: CPT | Performed by: OCCUPATIONAL THERAPIST

## 2022-02-14 PROCEDURE — 94640 AIRWAY INHALATION TREATMENT: CPT

## 2022-02-14 PROCEDURE — 97161 PT EVAL LOW COMPLEX 20 MIN: CPT | Performed by: PHYSICAL THERAPIST

## 2022-02-14 PROCEDURE — 36415 COLL VENOUS BLD VENIPUNCTURE: CPT

## 2022-02-14 RX ORDER — METHYLPREDNISOLONE SODIUM SUCCINATE 40 MG/ML
20 INJECTION, POWDER, LYOPHILIZED, FOR SOLUTION INTRAMUSCULAR; INTRAVENOUS DAILY
Status: DISCONTINUED | OUTPATIENT
Start: 2022-02-15 | End: 2022-02-15 | Stop reason: HOSPADM

## 2022-02-14 RX ORDER — DIPHENHYDRAMINE HCL 25 MG
25 TABLET ORAL ONCE
Status: COMPLETED | OUTPATIENT
Start: 2022-02-14 | End: 2022-02-14

## 2022-02-14 RX ORDER — METOCLOPRAMIDE 10 MG/1
10 TABLET ORAL ONCE
Status: COMPLETED | OUTPATIENT
Start: 2022-02-14 | End: 2022-02-14

## 2022-02-14 RX ORDER — MAGNESIUM SULFATE 1 G/100ML
1000 INJECTION INTRAVENOUS ONCE
Status: COMPLETED | OUTPATIENT
Start: 2022-02-14 | End: 2022-02-14

## 2022-02-14 RX ADMIN — Medication 10 MG: at 20:34

## 2022-02-14 RX ADMIN — ACETAMINOPHEN 650 MG: 325 TABLET ORAL at 05:00

## 2022-02-14 RX ADMIN — DOXYCYCLINE 100 MG: 100 INJECTION, POWDER, LYOPHILIZED, FOR SOLUTION INTRAVENOUS at 00:45

## 2022-02-14 RX ADMIN — BUDESONIDE 500 MCG: 0.5 INHALANT RESPIRATORY (INHALATION) at 06:16

## 2022-02-14 RX ADMIN — WATER 1000 MG: 1 INJECTION INTRAMUSCULAR; INTRAVENOUS; SUBCUTANEOUS at 14:59

## 2022-02-14 RX ADMIN — ONDANSETRON 4 MG: 2 INJECTION INTRAMUSCULAR; INTRAVENOUS at 17:19

## 2022-02-14 RX ADMIN — BUPRENORPHINE HYDROCHLORIDE AND NALOXONE HYDROCHLORIDE DIHYDRATE 1 TABLET: 8; 2 TABLET SUBLINGUAL at 20:35

## 2022-02-14 RX ADMIN — GUAIFENESIN 1200 MG: 600 TABLET, EXTENDED RELEASE ORAL at 10:02

## 2022-02-14 RX ADMIN — GUAIFENESIN 1200 MG: 600 TABLET, EXTENDED RELEASE ORAL at 20:35

## 2022-02-14 RX ADMIN — METHYLPREDNISOLONE SODIUM SUCCINATE 40 MG: 40 INJECTION, POWDER, FOR SOLUTION INTRAMUSCULAR; INTRAVENOUS at 10:00

## 2022-02-14 RX ADMIN — IPRATROPIUM BROMIDE AND ALBUTEROL SULFATE 1 AMPULE: .5; 2.5 SOLUTION RESPIRATORY (INHALATION) at 17:22

## 2022-02-14 RX ADMIN — MAGNESIUM SULFATE HEPTAHYDRATE 1000 MG: 1 INJECTION, SOLUTION INTRAVENOUS at 12:05

## 2022-02-14 RX ADMIN — BUPRENORPHINE HYDROCHLORIDE AND NALOXONE HYDROCHLORIDE DIHYDRATE 1 TABLET: 8; 2 TABLET SUBLINGUAL at 15:00

## 2022-02-14 RX ADMIN — Medication 10 ML: at 20:39

## 2022-02-14 RX ADMIN — IPRATROPIUM BROMIDE AND ALBUTEROL SULFATE 1 AMPULE: .5; 2.5 SOLUTION RESPIRATORY (INHALATION) at 09:32

## 2022-02-14 RX ADMIN — MIRTAZAPINE 30 MG: 15 TABLET, FILM COATED ORAL at 20:34

## 2022-02-14 RX ADMIN — ENOXAPARIN SODIUM 30 MG: 100 INJECTION SUBCUTANEOUS at 10:00

## 2022-02-14 RX ADMIN — ACETAMINOPHEN 650 MG: 325 TABLET ORAL at 20:33

## 2022-02-14 RX ADMIN — BUDESONIDE 500 MCG: 0.5 INHALANT RESPIRATORY (INHALATION) at 17:22

## 2022-02-14 RX ADMIN — IPRATROPIUM BROMIDE AND ALBUTEROL SULFATE 1 AMPULE: .5; 2.5 SOLUTION RESPIRATORY (INHALATION) at 14:54

## 2022-02-14 RX ADMIN — DOXYCYCLINE 100 MG: 100 INJECTION, POWDER, LYOPHILIZED, FOR SOLUTION INTRAVENOUS at 15:00

## 2022-02-14 RX ADMIN — ONDANSETRON 4 MG: 4 TABLET, ORALLY DISINTEGRATING ORAL at 05:00

## 2022-02-14 RX ADMIN — FLUTICASONE PROPIONATE 2 SPRAY: 50 SPRAY, METERED NASAL at 10:02

## 2022-02-14 RX ADMIN — BUSPIRONE HYDROCHLORIDE 5 MG: 5 TABLET ORAL at 10:01

## 2022-02-14 RX ADMIN — Medication 10 ML: at 09:59

## 2022-02-14 RX ADMIN — ATORVASTATIN CALCIUM 40 MG: 40 TABLET, FILM COATED ORAL at 20:35

## 2022-02-14 RX ADMIN — BUPRENORPHINE HYDROCHLORIDE AND NALOXONE HYDROCHLORIDE DIHYDRATE 1 TABLET: 8; 2 TABLET SUBLINGUAL at 10:01

## 2022-02-14 RX ADMIN — ASPIRIN 81 MG 81 MG: 81 TABLET ORAL at 10:01

## 2022-02-14 RX ADMIN — BUSPIRONE HYDROCHLORIDE 5 MG: 5 TABLET ORAL at 20:35

## 2022-02-14 RX ADMIN — BUSPIRONE HYDROCHLORIDE 5 MG: 5 TABLET ORAL at 15:00

## 2022-02-14 RX ADMIN — ENOXAPARIN SODIUM 30 MG: 100 INJECTION SUBCUTANEOUS at 20:39

## 2022-02-14 RX ADMIN — IPRATROPIUM BROMIDE AND ALBUTEROL SULFATE 1 AMPULE: .5; 2.5 SOLUTION RESPIRATORY (INHALATION) at 06:16

## 2022-02-14 RX ADMIN — DIPHENHYDRAMINE HCL 25 MG: 25 TABLET ORAL at 12:05

## 2022-02-14 RX ADMIN — ZINC SULFATE 220 MG (50 MG) CAPSULE 50 MG: CAPSULE at 10:01

## 2022-02-14 RX ADMIN — ACETYLCYSTEINE 400 MG: 100 SOLUTION ORAL; RESPIRATORY (INHALATION) at 17:22

## 2022-02-14 RX ADMIN — ACETYLCYSTEINE 400 MG: 100 SOLUTION ORAL; RESPIRATORY (INHALATION) at 06:16

## 2022-02-14 RX ADMIN — Medication 2000 UNITS: at 10:00

## 2022-02-14 RX ADMIN — METOCLOPRAMIDE 10 MG: 10 TABLET ORAL at 12:05

## 2022-02-14 RX ADMIN — PYRIDOXINE HCL TAB 50 MG 50 MG: 50 TAB at 10:01

## 2022-02-14 RX ADMIN — Medication 250 MG: at 10:02

## 2022-02-14 ASSESSMENT — PAIN DESCRIPTION - DESCRIPTORS
DESCRIPTORS: HEADACHE
DESCRIPTORS: HEADACHE

## 2022-02-14 ASSESSMENT — PAIN SCALES - GENERAL
PAINLEVEL_OUTOF10: 3
PAINLEVEL_OUTOF10: 3
PAINLEVEL_OUTOF10: 0
PAINLEVEL_OUTOF10: 1
PAINLEVEL_OUTOF10: 6
PAINLEVEL_OUTOF10: 8

## 2022-02-14 ASSESSMENT — PAIN DESCRIPTION - FREQUENCY
FREQUENCY: INTERMITTENT
FREQUENCY: INTERMITTENT

## 2022-02-14 ASSESSMENT — PAIN DESCRIPTION - PAIN TYPE
TYPE: ACUTE PAIN
TYPE: ACUTE PAIN

## 2022-02-14 ASSESSMENT — PAIN DESCRIPTION - LOCATION
LOCATION: HEAD
LOCATION: HEAD

## 2022-02-14 NOTE — PROGRESS NOTES
[address] [facility]  [facility]         Date:2022                                                   Patient Name: Madie Lopez     MRN: [id number]     : 1950     Room: [address] [place] [address]       Evaluating OT: Shannon Devine OTR/L; [id number]       Referring Provider and Orders/Date:   OT eval and treat Start: 22 1300, End: 22 1300, ONE TIME, Standing Count: 1 Occurrences, Carlos Pope MD     Diagnosis:   1. COVID-19    2.  Acute respiratory failure with hypoxia [facility])         Pertinent Medical History:        Past Medical History:   Diagnosis Date    Anxiety     Back pain, chronic     CAD (coronary artery disease)     Depression     History of cardiovascular stress test 2013    LEXISCAN STRESS    Hx of blood clots     Hyperlipemia     Hypertension     Pancreatitis           Past Surgical History:   Procedure Laterality Date    BACK SURGERY      CORONARY ANGIOPLASTY WITH STENT PLACEMENT      ECHO COMPL W DOP COLOR FLOW  1/10/2013         HAND SURGERY Right 10/12/2019    HAND INCISION AND DRAINAGE performed by hSantel Cast DO at [address] [facility] JOINT SURGERY         Precautions:  Fall Risk, covid+ with droplet iso, 4L    Recommended placement: home with HH    Assessment of current deficits     [x] Functional mobility  [x]ADLs  [x] Strength               []Cognition     [x] Functional transfers   [x] IADLs         [x] Safety Awareness   [x]Endurance     [] Fine Coordination              [x] Balance      [] Vision/perception   []Sensation      [x]Gross Motor Coordination  [] ROM  [] Delirium                   [] Motor Control     OT PLAN OF CARE   OT POC based on physician orders, patient diagnosis and results of clinical assessment    Frequency/Duration 1-3 days/wk for 2 weeks PRN   Specific OT Treatment Interventions to include:   * Instruction/training on adapted ADL techniques and AE recommendations to increase functional independence within precautions       * Training on energy conservation strategies, correct breathing pattern and techniques to improve independence/tolerance for self-care routine  * Functional transfer/mobility training/DME recommendations for increased independence, safety, and fall prevention  * Patient/Family education to increase follow through with safety techniques and functional independence  * Recommendation of environmental modifications for increased safety with functional transfers/mobility and ADLs  * Cognitive retraining/development of therapeutic activities to improve problem solving, judgement, memory, and attention for increased safety/participation in ADL/IADL tasks  * Therapeutic exercise to improve motor endurance, ROM, and functional strength for ADLs/functional transfers  * Therapeutic activities to facilitate/challenge dynamic balance, stand tolerance for increased safety and independence with ADLs  * Therapeutic activities to facilitate gross/fine motor skills for increased independence with ADLs  * Neuro-muscular re-education: facilitation of righting/equilibrium reactions, midline orientation, scapular stability/mobility, normalization of muscle tone, and facilitation of volitional active controled movement  * Positioning to improve skin integrity, interaction with environment and functional independence     Recommended Adaptive Equipment/DME:  TBD      Home Living: Pt lives with granddaughter (and spouse/children) with 24/7 supervision if needed, in a 3 story home with 4 steps to enter with rails. Bed/bath on first floor, but does go to basement for laundry with 16 steps and 1 rail.      Bathroom setup: walk in shower with shower chair and grab bars; standard toilet    DME owned: O2 PRN     Prior Level of Function: indep with ADLs , indep with IADLs; ambulated indep   Driving: yes   Occupation: retired   Enjoys: grandchildren/great grandchildren    Pain Level: headache 6/10; made nursing aware  Cognition: A&O: 4/4; Follows 3 step directions   Memory:  Intact   Sequencing:  Intact   Problem solving:  Intact   Judgement/safety:  Intact    AM-PAC Daily Activity Inpatient   How much help for putting on and taking off regular lower body clothing?: A Little  How much help for Bathing?: A Little  How much help for Toileting?: A Little  How much help for putting on and taking off regular upper body clothing?: A Little  How much help for taking care of personal grooming?: A Little  How much help for eating meals?: None  AM-PAC Inpatient Daily Activity Raw Score: 19  AM-PAC Inpatient ADL T-Scale Score : 40.22  ADL Inpatient CMS 0-100% Score: 42.8  ADL Inpatient CMS G-Code Modifier : CK    Functional Assessment:     Initial Eval Status  Date: 2/14/2022   Treatment Status  Date: STGs = LTGs  Time frame: 10-14 days   Feeding Independent   NA-PLOF   Grooming Minimal Assist for hair comb in the back. Set up sitting EOB for oral care, face wash and hand wash. Dropping O2  Independent    UB Dressing Minimal Assist with gown management from sitting EOB  Independent    LB Dressing Stand by Assist with socks and pants with cues for energy conservation. O2 dropping  Independent    Bathing Stand by Assist with sponge bathing from sitting on 3:1. Extended time with low O2. Independent    Toileting Stand by Assist with use of 3:1 with low O2  Independent    Bed Mobility  Supine to sit: Supervision   Sit to supine: Supervision     Supine to sit: Independent   Sit to supine: Independent    Functional Transfers Stand by Assist without AD from bed and 3:1.    Independent    Functional Mobility Stand by Assist without AD with limitations due to dropping O2  Independent    Balance Sitting:     Static:  Fair+    Dynamic:fair+  Standing: fair  Sitting:     Static:  good    Dynamic:good  Standing: fair+   Activity Tolerance Vitals with activity:4L 88% HR 71 from supine; dropped as low as 75% with ADLs and increased to 6L per nursing. Pt remains 79% with toileting and LB dressing. 84% with short distance functional mobility. Standing tolerance 45sec overall due to dropping O2. Increase standing tolerance for >3min with stable vital signs for carry over into toileting, functional tranfers and indep in ADLs   Visual/  Perceptual Glasses: not Present-broken; WFL    Reports change in vision since admission: No     NA   Gutierrez UE Strengthening  4-/5 generally  4+/5MMT generally for carry over into self care, functional transfers and functional mobility with AD. Hand Dominance  [x] Right  [] Left    AROM (PROM) Strength Additional Info:    RUE  WFL 4-/5 good  and  FMC/dexterity noted during ADL tasks  Opposition [x] Intact [] Impaired  Finger to nose [x] Intact [] Impaired     LUE WFL 4-/5 good  and FMC/dexterity noted during ADL tasks  Opposition [x] Intact [] Impaired  Finger to nose [x] Intact [] Impaired     Hearing: [place]  Sensation:  No c/o numbness or tingling   Tone: WFL   Edema: none    Comments: Upon arrival patient supine. Pt required min A for most UB ADLs and SBA LB ADLs tasks. Limited with SB A for standing during LB ADLs and functional transfers. The biggest barriers reflect that of functional transfers, functional mobility, UB/LB ADLs, activity tolerance, balance, safety and strengthening. At end of session, patient supine with call light and phone within reach, all lines and tubes intact. Overall patient demonstrated decreased independence and safety during completion of ADL/functional transfer/mobility tasks compared to PLOF. Nursing updated on pt position and status following OT eval. Pt would benefit from continued skilled OT to increase safety and independence with completion of ADL/IADL tasks for functional independence and quality of life.     Treatment: OT treatment provided this date includes:   Instruction, education and training on safe facilitation and adapted techniques for completion of ADLs. These include neuromuscular reeducation to facilitate balance/righting reactions,, safe functional transfer techniques and overall function and on adapted techniques/work simplification for completion of ADLs. Education provided on hand/feet placement with bed, 3:1 and body mechanics for fall prevention. Cues for energy conservation and safety for in the home at [place], including modifications and DME. Extended time was also required due to covid iso and extended time for proper PPE application and gathering of needed supplies for safety to stop the spread of disease. Thorough education provided to pt regarding breathing strategies, energy conservation and safety with new covid diagnosis.  Extended time to complete all tasks, including skilled monitoring of patient's response during treatment session and vital signs. Prior to and at the end of session, environmental modifications / line management completed for patients safety and efficiency of treatment session. See above for further details. Rehab Potential: Good  for established goals     Patient / Family Goal: O2 management      Patient and/or family were instructed on functional diagnosis, prognosis/goals and OT plan of care. Demonstrated good understanding. Eval Complexity: Low  · History: Brief review of medical records and additional review of physical, cognitive, or psychosocial history related to current functional performance  · Exam: 3+ performance deficits  · Assistance/Modification: Mod assistance or modifications required to perform tasks. May have comorbidities that affect occupational performance.     Time In: 7034  Time Out: 0929  Total Treatment Time: 11    Min Units   OT Eval Low 97165  x  1   OT Eval Medium 32646      OT Eval High 81928      OT Re-Eval [id number]       Therapeutic Ex 07779       Therapeutic Activities 03613  11 1    ADL/Self Care 76676 Orthotic Management [id number]       Manual [id number]     Neuro Re-Ed 79014       Non-Billable Time          Evaluation Time additionally includes thorough review of current medical information, gathering information on past medical history/social history and prior level of function, interpretation of standardized testing/informal observation of tasks, assessment of data and development of plan of care and goals.             Arleen Key OTR/L; [id number]

## 2022-02-14 NOTE — PROGRESS NOTES
Physical Therapy  Physical Therapy Initial Evaluation/Plan of Care    Room #:  0624/0624-01  Patient Name: Giovanni Amaral  YOB: 1950  MRN: [id number]    Date of Service: 2/14/2022     Tentative placement recommendation: Subacute vs Home Health Physical Therapy if patient meets goals  Equipment recommendation: To be determined      Evaluating Physical Therapist: Deepthi Smith Physical Therapist      Specific Provider Orders/Date/Referring Provider :   02/12/22 1300   PT eval and treat Start: 02/12/22 1300, End: 02/12/22 1300, ONE TIME, Standing Count: 1 Occurrences, Nicole Woo MD    Admitting Diagnosis:   Acute hypoxemic respiratory failure [facility] to COVID[facility]19 [facility] [U07.1, J96.01]      Surgery: none  Visit Diagnoses       Codes    COVID-19    -  Primary U07.1    Acute respiratory failure with hypoxia (HCC)     J96.01          Patient Active Problem List   Diagnosis    Syncope and collapse    Abdominal pain    Anemia    Chronic back pain    Anxiety    CAD (coronary artery disease), native coronary artery    Ampulla of Vater mass    Sepsis secondary to tenosynovitis    Red blood cell antibody positive    Acute hypoxemic respiratory failure due to COVID-19 [facility])        ASSESSMENT of Current Deficits Patient exhibits decreased strength, balance and endurance impairing functional mobility, transfers, gait  and gait distance. Patient with difficulty oxygenating impairing endurance and functional independence. Following activity, patient required 2-3 minutes for PO2 to become medically stable above 90% on 6 L. Patient was very motivated to perform therapy, and understood the importance of pursed lip breathing during and after activity. Increased oxygen dependence at rest and during activity, as well as home setup including steps are barriers to d/c that require skilled intervention during hospital stay to attain pre hospital level of function.  Decreased strength, balance and endurance  increases patient's risk for fall. PHYSICAL THERAPY  PLAN OF CARE       Physical therapy plan of care is established based on physician order,  patient diagnosis and clinical assessment    Current Treatment Recommendations:    -Standing Balance: Perform strengthening exercises in standing to promote motor control with or without upper extremity support  and Perform sit to stand activities maintaining FWB (full weight bearing) weightbearing Bilateral   -Transfers: Provide instruction on proper hand and foot position for adequate transfer of weight onto lower extremities and use of gait device if needed and Cues for hand placement, technique and safety. Provide stabilization to prevent fall   -Gait: Gait training and Standing activities to improve: base of support, weight shift, weight bearing    -Endurance: Utilize Supervised activities to increase level of endurance to allow for safe functional mobility including transfers and gait  and Use graduated activities to promote good breathing techniques and provide support and education to maximize respiratory function  -Stairs: Stair training with instruction on proper technique and hand placement on rail    PT long term treatment goals are located in below grid    Patient and or family understand(s) diagnosis, prognosis, and plan of care. Frequency of treatments: Patient will be seen  twice daily.          Prior Level of Function: Patient ambulated independently   Rehab Potential: good  for baseline    Past medical history:   Past Medical History:   Diagnosis Date    Anxiety     Back pain, chronic     CAD (coronary artery disease)     Depression     History of cardiovascular stress test 1-    LEXISCAN STRESS    Hx of blood clots     Hyperlipemia     Hypertension     Pancreatitis      Past Surgical History:   Procedure Laterality Date    BACK SURGERY      CORONARY ANGIOPLASTY WITH STENT PLACEMENT      ECHO COMPL W DOP COLOR FLOW 1/10/2013         HAND SURGERY Right 10/12/2019    HAND INCISION AND DRAINAGE performed by Laurel Zepeda DO at [address]          SUBJECTIVE:    Precautions: Up with assistance, Check Pulse Oximetry while ambulating  and Continuous Pulse Oximetry , O2 and Droplet plus/COVID-19 ,   Social history: Patient lives with granddaughter in a tri level home with full flight to each level witth rail with 4 steps  to enter with [employer] in shower grab bars, built in shower chair    Equipment owned: O2,  Wears it PRN 2-3 liters    AM-PAC Basic Mobility       AM-[facility] Mobility Inpatient   How much difficulty turning over in bed?: None  How much difficulty sitting down on / standing up from a chair with arms?: A Little  How much difficulty moving from lying on back to sitting on side of bed?: None  How much help from another person moving to and from a bed to a chair?: A Little  How much help from another person needed to walk in hospital room?: A Little  How much help from another person for climbing 3-5 steps with a railing?: A Lot  AM-PAC Inpatient Mobility Raw Score : 19  AM-PAC Inpatient T-Scale Score : 45.44  Mobility Inpatient CMS 0-100% Score: 41.77  Mobility Inpatient CMS G-Code Modifier : CK    Nursing cleared patient for PT evaluation. The admitting diagnosis and active problem list as listed above have been reviewed prior to the initiation of this evaluation. OBJECTIVE;   Initial Evaluation  Date: 2/14/2022 Treatment Date:     Short Term/ Long Term   Goals   Was pt agreeable to Eval/treatment? Yes  To be met in 4 days   Pain level 5/10 headache     Bed Mobility    Rolling: Independent    Supine to sit: Independent    Sit to supine: Independent    Scooting: Independent    Rolling: Independent    Supine to sit:  Independent    Sit to supine: Independent    Scooting: Independent     Transfers Sit to stand: Supervision    Sit to stand: Independent    Ambulation 2x25 feet using  no device with Minimal assist of 1   cues for sequencing, safety, pacing and pursed lip breathing   75 feet using  no device with Independent    Stair negotiation: ascended and descended   Not assessed     10 steps with rail supervision   ROM Within functional limits    Increase range of motion 10% of affected joints    Strength BUE:  4/5  RLE:  4/5  LLE:  4/5  Increase strength in affected mm groups by 1/3 grade   Balance Sitting EOB:  good   Dynamic Standing:  fair   Sitting EOB:  good   Dynamic Standing: good      Patient is Alert & Oriented x person, place, time and situation and follows directions    Sensation:  Patient  denies numbness/tingling   Edema:  no   Endurance: poor +    Vitals: 6 liters high flow nasal cannula   Blood Pressure at rest  Blood Pressure during session    Heart Rate at rest 80 Heart Rate during session 90-95   SPO2 at rest 92%  SPO2 during session 85-90%     Patient education  Patient educated on role of Physical Therapy, risks of immobility, safety and plan of care, energy conservation, importance of positional changes for oxygen exchange,  importance of mobility while in hospital , purse lip breathing, ankle pumps, quad set and glut set for edema control, blood clot prevention and safety      Patient response to education:   Pt verbalized understanding Pt demonstrated skill Pt requires further education in this area   Yes Partial Yes      Treatment:  Patient practiced and was instructed/facilitated in the following treatment: Patient  Sat edge of bed 15 minutes with Supervision  to increase dynamic sitting balance and activity tolerance. Patient ambulated within room with cueing for pursed lip breathing, reaching back before sitting in chair, and turning. Therapeutic Exercises:  long arc quad and seated marching  x 10 reps. At end of session, patient in chair with  call light and phone within reach,  all lines and tubes intact, nursing notified.       Patient would benefit from continued skilled Physical Therapy to improve functional independence and quality of life. Patient's/ family goals   home    Time in  36  Time out  1059    Total Treatment Time  12 minutes    Evaluation time includes thorough review of current medical information, gathering information on past medical history/social history and prior level of function, completion of standardized testing/informal observation of tasks, assessment of data, and development of Plan of care and goals.      CPT codes:  Low Complexity PT evaluation (55058)  Gait Training (46798) 12 minutes 1 unit(s)    Polly Wyman, Student Physical Therapist

## 2022-02-14 NOTE — PROGRESS NOTES
[address] [place], [place] [address]   Progress Note    Admitting Date and Time: 2/2/2022 11:15 AM  Admit Dx: Acute hypoxemic respiratory failure due to [facility]19 ([facility] [U07.1, J96.01]    Subjective:    Pt states she feels fine other than having another headache. She thinks her breathing is fine and states her cough is no worse than usual.    Per RN: Oxygen needed to be titrated up to 6L this morning    ROS: denies fever, chills, cp, sob, n/v, HA unless stated above.      magnesium sulfate  1,000 mg IntraVENous Once    And    metoclopramide  10 mg Oral Once    And    diphenhydrAMINE  25 mg Oral Once    busPIRone  5 mg Oral TID    cefTRIAXone (ROCEPHIN) IV  1,000 mg IntraVENous Q24H    doxycycline (VIBRAMYCIN) IV  100 mg IntraVENous Q12H    budesonide  0.5 mg Nebulization BID    atorvastatin  40 mg Oral Nightly    aspirin  81 mg Oral Daily    nicotine  1 patch TransDERmal Daily    melatonin  10 mg Oral Nightly    fluticasone  2 spray Each Nostril Daily    acetylcysteine  4 mL Inhalation BID    sodium chloride flush  5-40 mL IntraVENous 2 times per day    enoxaparin  30 mg SubCUTAneous BID    Vitamin D  2,000 Units Oral Daily    zinc sulfate  50 mg Oral Daily    vitamin B-6  50 mg Oral Daily    vitamin C  250 mg Oral Daily    ipratropium-albuterol  1 ampule Inhalation Q4H WA    guaiFENesin  1,200 mg Oral BID    mirtazapine  30 mg Oral Nightly    buprenorphine-naloxone  1 tablet SubLINGual TID    methylPREDNISolone  40 mg IntraVENous Daily     hydrOXYzine, 10 mg, BID PRN  acetaminophen, 650 mg, Q8H PRN   Or  acetaminophen, 650 mg, Q8H PRN  sodium chloride, 30 mL, PRN  sodium chloride flush, 5-40 mL, PRN  sodium chloride, 25 mL, PRN  ondansetron, 4 mg, Q8H PRN   Or  ondansetron, 4 mg, Q6H PRN  polyethylene glycol, 17 g, Daily PRN         Objective:    BP (!) 121/59   Pulse 69   Temp 98.1 °F (36.7 °C) (Infrared)   Resp 20   Ht 5' 5\" (1.651 m)   Wt 115 lb 11.9 oz (52.5 kg)   SpO2 93%   BMI 19.26 kg/m²   General Appearance: alert and oriented to person, place and time and in no acute distress, thin  Skin: warm and dry, has 1-2cm circular nodule on R axillary area, non-painful, mobile  Head: normocephalic and atraumatic  Eyes: pupils equal, round, and reactive to light, extraocular eye movements intact, conjunctivae normal  Neck: neck supple and non tender without mass   Pulmonary/Chest:lungs clear b/l, slightly reduced air movement, no respiratory distress on 6L NC   Cardiovascular: normal rate, normal S1 and S2 and no carotid bruits  Abdomen: soft, non-tender, non-distended, normal bowel sounds, no masses or organomegaly  Extremities: no cyanosis, no clubbing and no edema  Neurologic: no cranial nerve deficit and speech normal      Recent Labs     02/13/22  1100      K 4.2   CL 99   CO2 29   BUN 23   CREATININE 0.5   GLUCOSE 117*   CALCIUM 10.4*       No results for input(s): ALKPHOS, PROT, LABALBU, BILITOT, AST, ALT in the last 72 hours. Recent Labs     02/12/22  0557 02/13/22  1100 02/14/22  0541   WBC 12.7* 23.5* 15.6*   RBC 3.14* 3.94 3.50   HGB 11.2* 12.0 10.9*   HCT 31.2* 37.3 33.3*   MCV 99.4 94.7 95.1   MCH 35.7* 30.5 31.1   MCHC 35.9* 32.2 32.7   RDW 13.6 13.3 13.3   * 540* 506*   MPV 9.3 8.9 8.9         Radiology:   US BREAST LIMITED RIGHT   Final Result   Abnormal   Ovoid, circumscribed solid appearing nodule in superficial soft tissue at   level of right axilla likely represents a sebaceous cyst or enlarged lymph   node. Follow-up ultrasound recommended. BIRADS:   BIRADS - CATEGORY 3      Probably Benign Findings. A short interval follow-up is recommended in 1   month. OVERALL ASSESSMENT - PROBABLY BENIGN. A letter of notification will be sent to the patient regarding the results.          XR CHEST PORTABLE   Final Result   Minimal opacifications in the mid and lower lungs right greater than left   concerning for atelectasis versus airspace disease without large effusion         CTA CHEST W CONTRAST   Final Result   1. No CT evidence of pulmonary embolism. 2.  Mild upper lobe centrilobular emphysema. 3.  Mild lower lobe bronchiectasis with soft tissue density within multiple   lower lobe bronchi posteriorly most compatible with mucous plugging. Focal   area of consolidation in the right lower lobe suggestive of pneumonia. Probable reactive hilar adenopathy bilaterally. 4.  7 mm noncalcified soft tissue pulmonary nodule in the peripheral left   lower lobe. RECOMMENDATIONS:   Follow-up noncontrast CT chest in 3-6 months recommended. Fleischner Society guidelines for follow-up and management of incidentally   detected pulmonary nodules:      Multiple Solid Nodules:      ~Nodule size equals 6-8 mm. In a low-risk patient, CT at 3-6 months, then   consider CT at 18-24 months. In a high-risk patient, CT at 3-6 months, then   CT at 18-24 months. Radiology 2017 http://pubs. rsna.org/doi/full/10.1148/radiol. 6857052359         XR CHEST PORTABLE   Final Result   No acute process. CT HEAD WO CONTRAST    (Results Pending)       Assessment:  Active Problems:    Acute hypoxemic respiratory failure due to COVID-19 [facility])  Resolved Problems:    * No resolved hospital problems. *      Plan:  1. Acute respiratory failure 2/2 COVID  -CXR unremarkable, CTA showed no PE, mild lower lobe bronchiectasis consistent with mucous plugging and a 7mm pulmonary nodule in LLL  -Continue with solumedrol 40mg QD, today is day 13. Will start weaning.  -Received 5 doses of remdesivir  -Continue duonebs, incentive spirometer and PEP/flutter. Encourage proning  -Patient still failing walk test, not improving. Will add on pulmicort nebulizer to see if helps. -Will also add PT/OT to try to improve stamina with walking  -Procal, strep/legionella negative  -Oxygen requirement down to 6L today from 10L yesterday. Procal negative again x2, WBC down-trending.  Will keep abx on until have final respiratory culture and preliminary blood culture results. -Monitor O2 saturations, titrate O2 down as able, try another walk test today.     2. Mucous plugging  -Continue mucomyst and PEP/flutter  -Monitor, if any worsening in condition may need pulm consult     3. Pulmonary nodule  -7mm, noncalcified  -Patient is a smoker  -Recommendations per radiologist to follow up non-contrast CT chest in 3-6 months - discussed with patient.     4. Suboxone use  -Patient states she was previously addicted to pain pills but now has been on suboxone  -Obtained a prescription record with dosing (8-2mg TID) from [facility], so will resume home med     5. Anxiety/depression  -History established, home meds buspar 5mg BID and remeron 30mg QHS - will continue  -Increasing anxiety so will increase buspar to 5mg TID and add PRN hydroxyzine     6. CAD  -Patient states she has a stent placed about 10 years ago  -Obtained records that show patient had a stent placed in 2002 by Dr. Oscar Lyon  -Started aspirin and statin while here    7. Headache  -Patient reports headache last night and this morning  -Was given toradol, benadryl and reglan which helped  -Possible migraine as is with nausea and photophobia. May also be related to nicotine withdrawal, patient requested nicotine patch  -Gave magnesium, benadryl and reglan yesterday which helped - will give again if needed  -Will check noncontrast CT head to make sure no underlying process going on as continually getting headaches. 8.   R axillary nodule  -About 1-2cm in size, patient states is not painful. States it has been there for a couple years but has been getting bigger. Patient is not up to date on mammograms (last was 2014 which was negative), advised to follow up with PCP to do so  -US showed likely benign nodule, possibly sebaceous cyst vs enlarged lymph node. Recommended repeat US in 1 month - discussed with patient.     NOTE: This report was transcribed using voice recognition software. Every effort was made to ensure accuracy; however, inadvertent computerized transcription errors may be present.      Electronically signed by Mathew Fischer MD on 2/14/2022 at 11:00 AM

## 2022-02-14 NOTE — PLAN OF CARE
Problem: Risk for Fluid Volume Deficit  Goal: Maintain normal heart rhythm  Outcome: Met This Shift  Goal: Maintain absence of muscle cramping  Outcome: Met This Shift     Problem: Airway Clearance - Ineffective  Goal: Achieve or maintain patent airway  Outcome: Ongoing     Problem: Gas Exchange - Impaired  Goal: Absence of hypoxia  Outcome: Ongoing  Goal: Promote optimal lung function  Outcome: Ongoing     Problem: Breathing Pattern - Ineffective  Goal: Ability to achieve and maintain a regular respiratory rate  Outcome: Ongoing     Problem:  Body Temperature -  Risk of, Imbalanced  Goal: Ability to maintain a body temperature within defined limits  Outcome: Ongoing  Goal: Will regain or maintain usual level of consciousness  Outcome: Ongoing  Goal: Complications related to the disease process, condition or treatment will be avoided or minimized  Outcome: Ongoing     Problem: Isolation Precautions - Risk of Spread of Infection  Goal: Prevent transmission of infection  Outcome: Ongoing     Problem: Nutrition Deficits  Goal: Optimize nutritional status  Outcome: Ongoing     Problem: Risk for Fluid Volume Deficit  Goal: Maintain normal serum potassium, sodium, calcium, phosphorus, and pH  Outcome: Ongoing     Problem: Loneliness or Risk for Loneliness  Goal: Demonstrate positive use of time alone when socialization is not possible  Outcome: Ongoing

## 2022-02-15 VITALS
WEIGHT: 115.74 LBS | BODY MASS INDEX: 19.28 KG/M2 | RESPIRATION RATE: 18 BRPM | SYSTOLIC BLOOD PRESSURE: 109 MMHG | HEART RATE: 76 BPM | TEMPERATURE: 98.2 F | DIASTOLIC BLOOD PRESSURE: 53 MMHG | HEIGHT: 65 IN | OXYGEN SATURATION: 92 %

## 2022-02-15 LAB
BASOPHILS ABSOLUTE: 0.04 E9/L (ref 0–0.2)
BASOPHILS RELATIVE PERCENT: 0.2 % (ref 0–2)
EOSINOPHILS ABSOLUTE: 0.01 E9/L (ref 0.05–0.5)
EOSINOPHILS RELATIVE PERCENT: 0.1 % (ref 0–6)
HCT VFR BLD CALC: 35.6 % (ref 34–48)
HEMOGLOBIN: 11.3 G/DL (ref 11.5–15.5)
IMMATURE GRANULOCYTES #: 0.3 E9/L
IMMATURE GRANULOCYTES %: 1.5 % (ref 0–5)
LYMPHOCYTES ABSOLUTE: 1.9 E9/L (ref 1.5–4)
LYMPHOCYTES RELATIVE PERCENT: 9.8 % (ref 20–42)
MAGNESIUM: 2.3 MG/DL (ref 1.6–2.6)
MCH RBC QN AUTO: 31 PG (ref 26–35)
MCHC RBC AUTO-ENTMCNC: 31.7 % (ref 32–34.5)
MCV RBC AUTO: 97.5 FL (ref 80–99.9)
MONOCYTES ABSOLUTE: 0.47 E9/L (ref 0.1–0.95)
MONOCYTES RELATIVE PERCENT: 2.4 % (ref 2–12)
NEUTROPHILS ABSOLUTE: 16.74 E9/L (ref 1.8–7.3)
NEUTROPHILS RELATIVE PERCENT: 86 % (ref 43–80)
PDW BLD-RTO: 13.6 FL (ref 11.5–15)
PLATELET # BLD: 489 E9/L (ref 130–450)
PMV BLD AUTO: 8.9 FL (ref 7–12)
RBC # BLD: 3.65 E12/L (ref 3.5–5.5)
WBC # BLD: 19.5 E9/L (ref 4.5–11.5)

## 2022-02-15 PROCEDURE — 6370000000 HC RX 637 (ALT 250 FOR IP): Performed by: STUDENT IN AN ORGANIZED HEALTH CARE EDUCATION/TRAINING PROGRAM

## 2022-02-15 PROCEDURE — 94669 MECHANICAL CHEST WALL OSCILL: CPT

## 2022-02-15 PROCEDURE — 6360000002 HC RX W HCPCS: Performed by: STUDENT IN AN ORGANIZED HEALTH CARE EDUCATION/TRAINING PROGRAM

## 2022-02-15 PROCEDURE — 97116 GAIT TRAINING THERAPY: CPT

## 2022-02-15 PROCEDURE — 94640 AIRWAY INHALATION TREATMENT: CPT

## 2022-02-15 PROCEDURE — 2500000003 HC RX 250 WO HCPCS: Performed by: STUDENT IN AN ORGANIZED HEALTH CARE EDUCATION/TRAINING PROGRAM

## 2022-02-15 PROCEDURE — 36415 COLL VENOUS BLD VENIPUNCTURE: CPT

## 2022-02-15 PROCEDURE — 2700000000 HC OXYGEN THERAPY PER DAY

## 2022-02-15 PROCEDURE — 97530 THERAPEUTIC ACTIVITIES: CPT

## 2022-02-15 PROCEDURE — 85025 COMPLETE CBC W/AUTO DIFF WBC: CPT

## 2022-02-15 PROCEDURE — 99239 HOSP IP/OBS DSCHRG MGMT >30: CPT | Performed by: STUDENT IN AN ORGANIZED HEALTH CARE EDUCATION/TRAINING PROGRAM

## 2022-02-15 PROCEDURE — 2580000003 HC RX 258: Performed by: STUDENT IN AN ORGANIZED HEALTH CARE EDUCATION/TRAINING PROGRAM

## 2022-02-15 PROCEDURE — 83735 ASSAY OF MAGNESIUM: CPT

## 2022-02-15 RX ORDER — METOCLOPRAMIDE 10 MG/1
10 TABLET ORAL ONCE
Status: COMPLETED | OUTPATIENT
Start: 2022-02-15 | End: 2022-02-15

## 2022-02-15 RX ORDER — FLUTICASONE PROPIONATE 50 MCG
2 SPRAY, SUSPENSION (ML) NASAL DAILY
Qty: 16 G | Refills: 0 | Status: SHIPPED | OUTPATIENT
Start: 2022-02-16 | End: 2022-03-17 | Stop reason: SDUPTHER

## 2022-02-15 RX ORDER — ASPIRIN 81 MG/1
81 TABLET, CHEWABLE ORAL DAILY
Qty: 30 TABLET | Refills: 0 | Status: SHIPPED | OUTPATIENT
Start: 2022-02-16 | End: 2022-03-17 | Stop reason: SDUPTHER

## 2022-02-15 RX ORDER — ATORVASTATIN CALCIUM 40 MG/1
40 TABLET, FILM COATED ORAL NIGHTLY
Qty: 30 TABLET | Refills: 0 | Status: SHIPPED | OUTPATIENT
Start: 2022-02-15 | End: 2022-03-17 | Stop reason: SDUPTHER

## 2022-02-15 RX ORDER — DIPHENHYDRAMINE HCL 25 MG
25 TABLET ORAL ONCE
Status: COMPLETED | OUTPATIENT
Start: 2022-02-15 | End: 2022-02-15

## 2022-02-15 RX ORDER — GUAIFENESIN 600 MG/1
1200 TABLET, EXTENDED RELEASE ORAL 2 TIMES DAILY PRN
Qty: 20 TABLET | Refills: 0 | Status: SHIPPED | OUTPATIENT
Start: 2022-02-15 | End: 2022-02-25

## 2022-02-15 RX ORDER — MECOBALAMIN 5000 MCG
5 TABLET,DISINTEGRATING ORAL NIGHTLY
Qty: 30 TABLET | Refills: 0 | Status: SHIPPED | OUTPATIENT
Start: 2022-02-15 | End: 2022-03-17 | Stop reason: SDUPTHER

## 2022-02-15 RX ORDER — MAGNESIUM SULFATE 1 G/100ML
1000 INJECTION INTRAVENOUS ONCE
Status: COMPLETED | OUTPATIENT
Start: 2022-02-15 | End: 2022-02-15

## 2022-02-15 RX ORDER — PREDNISONE 20 MG/1
TABLET ORAL
Qty: 18 TABLET | Refills: 0 | Status: SHIPPED | OUTPATIENT
Start: 2022-02-15 | End: 2022-03-07

## 2022-02-15 RX ORDER — HYDROXYZINE HYDROCHLORIDE 10 MG/1
10 TABLET, FILM COATED ORAL 2 TIMES DAILY PRN
Qty: 10 TABLET | Refills: 0 | Status: SHIPPED | OUTPATIENT
Start: 2022-02-15 | End: 2022-02-20

## 2022-02-15 RX ADMIN — PYRIDOXINE HCL TAB 50 MG 50 MG: 50 TAB at 08:40

## 2022-02-15 RX ADMIN — BUSPIRONE HYDROCHLORIDE 5 MG: 5 TABLET ORAL at 08:40

## 2022-02-15 RX ADMIN — ONDANSETRON 4 MG: 2 INJECTION INTRAMUSCULAR; INTRAVENOUS at 05:41

## 2022-02-15 RX ADMIN — ACETAMINOPHEN 650 MG: 325 TABLET ORAL at 17:11

## 2022-02-15 RX ADMIN — ASPIRIN 81 MG 81 MG: 81 TABLET ORAL at 08:40

## 2022-02-15 RX ADMIN — BUPRENORPHINE HYDROCHLORIDE AND NALOXONE HYDROCHLORIDE DIHYDRATE 1 TABLET: 8; 2 TABLET SUBLINGUAL at 08:40

## 2022-02-15 RX ADMIN — ENOXAPARIN SODIUM 30 MG: 100 INJECTION SUBCUTANEOUS at 08:39

## 2022-02-15 RX ADMIN — IPRATROPIUM BROMIDE AND ALBUTEROL SULFATE 1 AMPULE: .5; 2.5 SOLUTION RESPIRATORY (INHALATION) at 14:01

## 2022-02-15 RX ADMIN — DOXYCYCLINE 100 MG: 100 INJECTION, POWDER, LYOPHILIZED, FOR SOLUTION INTRAVENOUS at 01:53

## 2022-02-15 RX ADMIN — BUDESONIDE 500 MCG: 0.5 INHALANT RESPIRATORY (INHALATION) at 06:53

## 2022-02-15 RX ADMIN — ONDANSETRON 4 MG: 4 TABLET, ORALLY DISINTEGRATING ORAL at 17:10

## 2022-02-15 RX ADMIN — ACETYLCYSTEINE 400 MG: 100 SOLUTION ORAL; RESPIRATORY (INHALATION) at 06:53

## 2022-02-15 RX ADMIN — ACETAMINOPHEN 650 MG: 325 TABLET ORAL at 05:42

## 2022-02-15 RX ADMIN — HYDROXYZINE HYDROCHLORIDE 10 MG: 10 TABLET ORAL at 08:43

## 2022-02-15 RX ADMIN — IPRATROPIUM BROMIDE AND ALBUTEROL SULFATE 1 AMPULE: .5; 2.5 SOLUTION RESPIRATORY (INHALATION) at 10:53

## 2022-02-15 RX ADMIN — MAGNESIUM SULFATE HEPTAHYDRATE 1000 MG: 1 INJECTION, SOLUTION INTRAVENOUS at 11:42

## 2022-02-15 RX ADMIN — BUPRENORPHINE HYDROCHLORIDE AND NALOXONE HYDROCHLORIDE DIHYDRATE 1 TABLET: 8; 2 TABLET SUBLINGUAL at 15:29

## 2022-02-15 RX ADMIN — Medication 2000 UNITS: at 08:40

## 2022-02-15 RX ADMIN — DOXYCYCLINE 100 MG: 100 INJECTION, POWDER, LYOPHILIZED, FOR SOLUTION INTRAVENOUS at 13:17

## 2022-02-15 RX ADMIN — DIPHENHYDRAMINE HCL 25 MG: 25 TABLET ORAL at 11:41

## 2022-02-15 RX ADMIN — FLUTICASONE PROPIONATE 2 SPRAY: 50 SPRAY, METERED NASAL at 08:47

## 2022-02-15 RX ADMIN — WATER 1000 MG: 1 INJECTION INTRAMUSCULAR; INTRAVENOUS; SUBCUTANEOUS at 13:18

## 2022-02-15 RX ADMIN — GUAIFENESIN 1200 MG: 600 TABLET, EXTENDED RELEASE ORAL at 08:40

## 2022-02-15 RX ADMIN — IPRATROPIUM BROMIDE AND ALBUTEROL SULFATE 1 AMPULE: .5; 2.5 SOLUTION RESPIRATORY (INHALATION) at 06:53

## 2022-02-15 RX ADMIN — Medication 250 MG: at 08:40

## 2022-02-15 RX ADMIN — METHYLPREDNISOLONE SODIUM SUCCINATE 20 MG: 40 INJECTION, POWDER, FOR SOLUTION INTRAMUSCULAR; INTRAVENOUS at 08:40

## 2022-02-15 RX ADMIN — ZINC SULFATE 220 MG (50 MG) CAPSULE 50 MG: CAPSULE at 08:39

## 2022-02-15 RX ADMIN — Medication 10 ML: at 08:40

## 2022-02-15 RX ADMIN — BUSPIRONE HYDROCHLORIDE 5 MG: 5 TABLET ORAL at 15:29

## 2022-02-15 RX ADMIN — METOCLOPRAMIDE 10 MG: 10 TABLET ORAL at 11:41

## 2022-02-15 ASSESSMENT — PAIN DESCRIPTION - PROGRESSION: CLINICAL_PROGRESSION: GRADUALLY IMPROVING

## 2022-02-15 ASSESSMENT — PAIN DESCRIPTION - FREQUENCY: FREQUENCY: INTERMITTENT

## 2022-02-15 ASSESSMENT — PAIN DESCRIPTION - ORIENTATION: ORIENTATION: RIGHT

## 2022-02-15 ASSESSMENT — PAIN DESCRIPTION - PAIN TYPE: TYPE: ACUTE PAIN

## 2022-02-15 ASSESSMENT — PAIN SCALES - GENERAL
PAINLEVEL_OUTOF10: 1
PAINLEVEL_OUTOF10: 3
PAINLEVEL_OUTOF10: 8

## 2022-02-15 ASSESSMENT — PAIN DESCRIPTION - LOCATION: LOCATION: HEAD

## 2022-02-15 ASSESSMENT — PAIN DESCRIPTION - DESCRIPTORS: DESCRIPTORS: THROBBING

## 2022-02-15 NOTE — PLAN OF CARE
Problem: Airway Clearance - Ineffective  Goal: Achieve or maintain patent airway  Outcome: Met This Shift     Problem: Gas Exchange - Impaired  Goal: Absence of hypoxia  Outcome: Met This Shift  Goal: Promote optimal lung function  Outcome: Met This Shift     Problem: Breathing Pattern - Ineffective  Goal: Ability to achieve and maintain a regular respiratory rate  Outcome: Met This Shift     Problem:  Body Temperature -  Risk of, Imbalanced  Goal: Ability to maintain a body temperature within defined limits  Outcome: Met This Shift  Goal: Will regain or maintain usual level of consciousness  Outcome: Met This Shift  Goal: Complications related to the disease process, condition or treatment will be avoided or minimized  Outcome: Met This Shift     Problem: Isolation Precautions - Risk of Spread of Infection  Goal: Prevent transmission of infection  Outcome: Met This Shift     Problem: Nutrition Deficits  Goal: Optimize nutritional status  Outcome: Met This Shift     Problem: Risk for Fluid Volume Deficit  Goal: Maintain normal heart rhythm  Outcome: Met This Shift  Goal: Maintain absence of muscle cramping  Outcome: Met This Shift  Goal: Maintain normal serum potassium, sodium, calcium, phosphorus, and pH  Outcome: Met This Shift     Problem: Loneliness or Risk for Loneliness  Goal: Demonstrate positive use of time alone when socialization is not possible  Outcome: Met This Shift     Problem: Fatigue  Goal: Verbalize increase energy and improved vitality  Outcome: Met This Shift     Problem: Patient Education: Go to Patient Education Activity  Goal: Patient/Family Education  Outcome: Met This Shift     Problem: Falls - Risk of:  Goal: Will remain free from falls  Description: Will remain free from falls  Outcome: Met This Shift  Goal: Absence of physical injury  Description: Absence of physical injury  Outcome: Met This Shift     Problem: Pain:  Goal: Pain level will decrease  Description: Pain level will decrease  Outcome: Met This Shift  Goal: Control of acute pain  Description: Control of acute pain  Outcome: Met This Shift  Goal: Control of chronic pain  Description: Control of chronic pain  Outcome: Met This Shift

## 2022-02-15 NOTE — PROGRESS NOTES
Physician Progress Note      PATIENT:               Dyan Pizarro  CSN #:                  [id number]  :                       1950  ADMIT DATE:       2022 11:15 AM  DISCH DATE:  RESPONDING  PROVIDER #:        Farrukh Reinoso          QUERY TEXT:    Pt admitted with COVID-19 and noted to have elevated WBC's, Elevated CRP and   respiratory failure. If possible, please document in progress notes and   discharge summary if you are evaluating and/or treating: The medical record reflects the following:  Risk Factors: COVID-19, recovering from opiate addiction, Anxiety and   depression, smoker,  Clinical Indicators: WBC:-10.6, 2/3-12.4, -15.1, -23.5. .;   CRP:2/3-3.6, 2/10-7.1;  Temp lowest of 96.3, BP-99/53; : 84%ra,   -89%on 4L, -82% on 4L,  Treatment: IVF bolus, COVID protocol,  Rocephin iv, Vibramycin iv, 0xygen up   to 10L high flow NC, monitoring and assessments. Thank you for your assistance,  Grecia Siddiqui RN, BSN, CCDS  [phone]  Options provided:  -- Sepsis present on admission due to COVID-19 infection  -- Sepsis not present on admission due to COVID-19 infection  -- Sepsis present on admission due to COVID-19 pneumonia  -- Sepsis not present on admission due to COVID-19 pneumonia  -- Covid-19 infection without sepsis  -- Covid-19 pneumonia without sepsis  -- Other - I will add my own diagnosis  -- Disagree - Not applicable / Not valid  -- Disagree - Clinically unable to determine / Unknown  -- Refer to Clinical Documentation Reviewer    PROVIDER RESPONSE TEXT:    This patient has Covid-19 infection without sepsis. Query created by:  Fouzia Correa on 2/15/2022 8:11 AM      Electronically signed by:  Farrukh Reinoso 2/15/2022 12:57 PM

## 2022-02-15 NOTE — CARE COORDINATION
SOCIAL WORK / DISCHARGE PLANNING:  COVID positive 2/2. Walk test completed this am, requiring 6L with exertion, to be repeated this afternoon by RN. When pt is discharged, will need O2 arranged with [facility] - [phone], will need script and testing for arrangements. Pt to return home with granddtr, denies other needs. Family will provide home going transport. Addendum: 345pm Sw called [facility] left voice message noting pt will be discharged today. She will need portable delivered to hosptial. Testing in [facility], spoke with Dr Myles Brittle, placing O2 script as well.      Addendum; 401pm Confirmation received from [facility] re: O2 order, will deliver portable to hospital.         Electronically signed by NERY Calles on 2/15/2022 at 1:34 PM

## 2022-02-15 NOTE — PROGRESS NOTES
Patient on room air at rest, pulse ox 87%. Applied oxygen 4L NC while at rest, pulse ox recovered to 93-94%. Ambulated 100 feet while on 4L NC, pulse ox stayed at 92% without any complaint of SOB.

## 2022-02-15 NOTE — DISCHARGE SUMMARY
[facility] Physician Discharge Summary       Brooks Solis DO  [address] [facility] [address]  [phone]    On 2/22/2022      Brooks Solis DO  [facility] [address] [id number]    On 2/22/2022  New PCP and follow up visit with Dr. Tri Robin at [facility] on Tue Feb 22nd @ 8:30 AM. Please bring insurance card, all medications and wear a mask. [address]  [id number]  [phone]            Activity level: as tolerated    Diet: ADULT DIET; Regular; Low Fat/Low Chol/High Fiber/NE  ADULT ORAL NUTRITION SUPPLEMENT; Breakfast, Dinner; Standard High Calorie/High Protein Oral Supplement    Labs:as per PCP. Needs to have repeat R breast US for follow up of R breast/axillary nodule in 1 month as well as repeat chest CT for lung nodule in 3-6 months per radiology. Dispo:Home     Condition at discharge: Stable    Continue supplemental oxygen via nasal canula @ 4 LPM round-the-clock. Patient ID:  Terrance Gonzalez  [id number]  73 y.o.  1950    Admit date: 2/2/2022    Discharge date and time:  2/15/2022  4:02 PM    Admission Diagnoses: Active Problems:    Acute hypoxemic respiratory failure due to COVID-19 [facility])  Resolved Problems:    * No resolved hospital problems. *      Discharge Diagnoses: Active Problems:    Acute hypoxemic respiratory failure due to COVID-19 [facility])  Resolved Problems:    * No resolved hospital problems. *      Consults:  None    Procedures: None    Hospital Course: Patient was admitted with Acute hypoxemic respiratory failure due [facility] [U07.1, J96.01]. Patient was managed for COVID with remdesivir, pulmicort nebulizer, duonebs, IV steroids, supportive vitamins and other supportive care such as incentive spirometer, PEP/flutter and encouragement of proning.  CTA was done which was negative for PE but did show incidental 7mm nodule in LLL and bronchiectasis. CXR was unremarkable. The patient was requiring 5-6L of O2 initially but would require up to 15L during ambulation. At one point her resting oxygen requirements increased to 10L and she developed leukocytosis so she was started on antibiotics empirically and blood and respiratory cultures were collected. CXR was repeated and showed b/l airspace disease. Procalcitonin was also checked and was negative x2 and cultures did not grow anything preliminarily so antibiotics were stopped at discharge. The patient was getting recurrent headaches so head CT was done which did not reveal any acute findings but did show signs of chronic sinusitis. She complained of a nodule in her R breast for which an US was done. It showed likely benign findings, stated likely to be a sebaceous cyst vs enlarged lymph node and radiologist recommended repeat US in 1 month. Radiologist also recommended repeat chest CT in 3-6 months for the 7mm lung nodule in LLL. The patient was also advised to schedule to have a mammogram as she is not up to date. Eventually her oxygen was weaned to 4L and she was able to ambulate 100 meters and saturate well on 4L so she was stable for discharge home with home oxygen which was set up for her. The patient was set up with appointment for new PCP and needs to go to that appointment.       Discharge Exam:  Vitals:    02/14/22 1845 02/15/22 0154 02/15/22 0820 02/15/22 1526   BP:  (!) 113/56 (!) 112/56 (!) 109/53   Pulse:  63 72 76   Resp:  15 16 18   Temp:  97.8 °F (36.6 °C) 96.8 °F (36 °C) 98.2 °F (36.8 °C)   TempSrc:  Infrared Infrared Infrared   SpO2: 94% 97% 93% 92%   Weight:       Height:         General Appearance: alert and oriented to person, place and time and in no acute distress, thin  Skin: warm and dry, has 1-2cm circular nodule on R axillary area, non-painful, mobile  Head: normocephalic and atraumatic  Eyes: pupils equal, round, and reactive to light, extraocular eye movements intact, conjunctivae normal  Neck: neck supple and non tender without mass   Pulmonary/Chest:lungs clear b/l, slightly reduced air movement, no respiratory distress on 6L NC   Cardiovascular: normal rate, normal S1 and S2 and no carotid bruits  Abdomen: soft, non-tender, non-distended, normal bowel sounds, no masses or organomegaly  Extremities: no cyanosis, no clubbing and no edema  Neurologic: no cranial nerve deficit and speech normal    I/O last 3 completed shifts: In: 360 [P.O.:360]  Out: -   I/O this shift: In: 26 [P.O.:460]  Out: 1400 [Urine:1400]      LABS:  Recent Labs     02/13/22  1100      K 4.2   CL 99   CO2 29   BUN 23   CREATININE 0.5   GLUCOSE 117*   CALCIUM 10.4*       Recent Labs     02/13/22  1100 02/14/22  0541 02/15/22  1121   WBC 23.5* 15.6* 19.5*   RBC 3.94 3.50 3.65   HGB 12.0 10.9* 11.3*   HCT 37.3 33.3* 35.6   MCV 94.7 95.1 97.5   MCH 30.5 31.1 31.0   MCHC 32.2 32.7 31.7*   RDW 13.3 13.3 13.6   * 506* 489*   MPV 8.9 8.9 8.9       No results for input(s): POCGLU in the last 72 hours. Imaging:   CT HEAD WO CONTRAST   Final Result   No acute intracranial abnormality. Fluid in the left maxillary sinus. US BREAST LIMITED RIGHT   Final Result   Abnormal   Ovoid, circumscribed solid appearing nodule in superficial soft tissue at   level of right axilla likely represents a sebaceous cyst or enlarged lymph   node. Follow-up ultrasound recommended. BIRADS:   BIRADS - CATEGORY 3      Probably Benign Findings. A short interval follow-up is recommended in 1   month. OVERALL ASSESSMENT - PROBABLY BENIGN. A letter of notification will be sent to the patient regarding the results. XR CHEST PORTABLE   Final Result   Minimal opacifications in the mid and lower lungs right greater than left   concerning for atelectasis versus airspace disease without large effusion         CTA CHEST W CONTRAST   Final Result   1. No CT evidence of pulmonary embolism. 2.  Mild upper lobe centrilobular emphysema. 3.  Mild lower lobe bronchiectasis with soft tissue density within multiple   lower lobe bronchi posteriorly most compatible with mucous plugging. Focal   area of consolidation in the right lower lobe suggestive of pneumonia. Probable reactive hilar adenopathy bilaterally. 4.  7 mm noncalcified soft tissue pulmonary nodule in the peripheral left   lower lobe. RECOMMENDATIONS:   Follow-up noncontrast CT chest in 3-6 months recommended. Fleischner Society guidelines for follow-up and management of incidentally   detected pulmonary nodules:      Multiple Solid Nodules:      ~Nodule size equals 6-8 mm. In a low-risk patient, CT at 3-6 months, then   consider CT at 18-24 months. In a high-risk patient, CT at 3-6 months, then   CT at 18-24 months. Radiology 2017 http://pubs. rsna.org/doi/full/10.1148/radiol. 2136539755         XR CHEST PORTABLE   Final Result   No acute process.              Patient Instructions:      Medication List      START taking these medications    albuterol-ipratropium  MCG/ACT Aers inhaler  Commonly known as: COMBIVENT RESPIMAT  Inhale 1 puff into the lungs every 6 hours as needed for Wheezing or Shortness of Breath     aspirin 81 MG chewable tablet  Take 1 tablet by mouth daily  Start taking on: February 16, 2022     atorvastatin 40 MG tablet  Commonly known as: LIPITOR  Take 1 tablet by mouth nightly     fluticasone 50 MCG/ACT nasal spray  Commonly known as: FLONASE  2 sprays by Each Nostril route daily  Start taking on: February 16, 2022     hydrOXYzine 10 MG tablet  Commonly known as: ATARAX  Take 1 tablet by mouth 2 times daily as needed for Anxiety     melatonin 5 MG Tbdp disintegrating tablet  Take 1 tablet by mouth nightly     nicotine 7 MG/24HR  Commonly known as: NICODERM CQ  Place 1 patch onto the skin daily  Start taking on: February 16, 2022     predniSONE 20 MG tablet  Commonly known as: DELTASONE  Take 1.5 tablets by mouth daily for 5 days, THEN 1 tablet daily for 5 days, THEN 0.5 tablets daily for 5 days, THEN 0.5 tablets daily for 5 days. Start taking on: February 15, 2022        CHANGE how you take these medications    guaiFENesin 600 MG extended release tablet  Commonly known as: MUCINEX  Take 2 tablets by mouth 2 times daily as needed for Congestion (cough)  What changed:   · when to take this  · reasons to take this        CONTINUE taking these medications    busPIRone 5 MG tablet  Commonly known as: BUSPAR     mirtazapine 30 MG tablet  Commonly known as: REMERON     Suboxone 8-2 MG Film SL film  Generic drug: buprenorphine-naloxone        STOP taking these medications    acetaminophen 325 MG tablet  Commonly known as: TYLENOL     diphenhydrAMINE 50 MG/ML injection  Commonly known as: BENADRYL     mupirocin 2 % ointment  Commonly known as: BACTROBAN           Where to Get Your Medications      These medications were sent to [address] [place] [address] [facility] [phone]  [address] [place], [place] [address] [address]    Phone: [phone]   · albuterol-ipratropium  MCG/ACT Aers inhaler  · aspirin 81 MG chewable tablet  · atorvastatin 40 MG tablet  · fluticasone 50 MCG/ACT nasal spray  · guaiFENesin 600 MG extended release tablet  · hydrOXYzine 10 MG tablet  · melatonin 5 MG Tbdp disintegrating tablet  · nicotine 7 MG/24HR  · predniSONE 20 MG tablet           Note that more than 30 minutes was spent in preparing discharge papers, discussing discharge with patient, medication review, etc.    Signed:  Electronically signed by Asha Almanza MD on 2/15/2022 at 4:02 PM    NOTE: This report was transcribed using voice recognition software. Every effort was made to ensure accuracy; however, inadvertent computerized transcription errors may be present.

## 2022-02-15 NOTE — PROGRESS NOTES
Physical Therapy  Physical Therapy Treatment Note/Plan of Care    Room #:  0624/0624-01  Patient Name: Kerri Brittle  YOB: 1950  MRN: [id number]    Date of Service: 2/15/2022     Tentative placement recommendation: Subacute vs Home Health Physical Therapy if patient meets goals  Equipment recommendation: To be determined      Evaluating Physical Therapist: Bakari Powell, Student Physical Therapist      Specific Provider Orders/Date/Referring Provider :   02/12/22 1300   PT eval and treat Start: 02/12/22 1300, End: 02/12/22 1300, ONE TIME, Standing Count: 1 Occurrences, Adalid Roberts MD    Admitting Diagnosis:   Acute hypoxemic respiratory failure [facility]19 ([facility] [U07.1, J96.01]      Surgery: none  Visit Diagnoses       Codes    COVID-19    -  Primary U07.1    Acute respiratory failure with hypoxia (HCC)     J96.01          Patient Active Problem List   Diagnosis    Syncope and collapse    Abdominal pain    Anemia    Chronic back pain    Anxiety    CAD (coronary artery disease), native coronary artery    Ampulla of Vater mass    Sepsis secondary to tenosynovitis    Red blood cell antibody positive    Acute hypoxemic respiratory failure due to COVID-19 [facility])        ASSESSMENT of Current Deficits Patient exhibits decreased strength, balance and endurance impairing functional mobility, transfers, gait  and gait distance. Patient walked in hallway today to monitor SPO2 levels. Patient started on 4L O2 at 90%, amb 50 feet and desat to 85%, O2 inc to 6L and patient took 2min to recover to 90% and amb back to room. Patient reports no shortness in breath and not feeling different during session. Patient aware of exercises in supine and seated position for strengthening. Patient requires inc in time to recover with O2 levels.  Increased oxygen dependence at rest and during activity, as well as home setup including steps are barriers to d/c that require skilled intervention during hospital stay to attain pre hospital level of function. Decreased strength, balance and endurance  increases patient's risk for fall. PHYSICAL THERAPY  PLAN OF CARE       Physical therapy plan of care is established based on physician order,  patient diagnosis and clinical assessment    Current Treatment Recommendations:    -Standing Balance: Perform strengthening exercises in standing to promote motor control with or without upper extremity support  and Perform sit to stand activities maintaining FWB (full weight bearing) weightbearing Bilateral   -Transfers: Provide instruction on proper hand and foot position for adequate transfer of weight onto lower extremities and use of gait device if needed and Cues for hand placement, technique and safety. Provide stabilization to prevent fall   -Gait: Gait training and Standing activities to improve: base of support, weight shift, weight bearing    -Endurance: Utilize Supervised activities to increase level of endurance to allow for safe functional mobility including transfers and gait  and Use graduated activities to promote good breathing techniques and provide support and education to maximize respiratory function  -Stairs: Stair training with instruction on proper technique and hand placement on rail    PT long term treatment goals are located in below grid    Patient and or family understand(s) diagnosis, prognosis, and plan of care. Frequency of treatments: Patient will be seen  twice daily.          Prior Level of Function: Patient ambulated independently   Rehab Potential: good  for baseline    Past medical history:   Past Medical History:   Diagnosis Date    Anxiety     Back pain, chronic     CAD (coronary artery disease)     Depression     History of cardiovascular stress test 1-    LEXISCAN STRESS    Hx of blood clots     Hyperlipemia     Hypertension     Pancreatitis      Past Surgical History:   Procedure Laterality Date    BACK SURGERY      CORONARY ANGIOPLASTY WITH STENT PLACEMENT      ECHO COMPL W DOP COLOR FLOW  1/10/2013         HAND SURGERY Right 10/12/2019    HAND INCISION AND DRAINAGE performed by Sahntel Cast DO at [address] [facility] JOINT SURGERY         SUBJECTIVE:    Precautions: Up with assistance, Check Pulse Oximetry while ambulating  and Continuous Pulse Oximetry , O2 and Droplet plus/COVID-19 ,   Social history: Patient lives with granddaughter in a tri level home with full flight to each level witth rail with 4 steps  to enter with [employer] in shower grab bars, built in shower chair    Equipment owned: O2,  Wears it PRN 2-3 liters    AM-PAC Basic Mobility       AM-PAC Mobility Inpatient   How much difficulty turning over in bed?: None  How much difficulty sitting down on / standing up from a chair with arms?: None  How much difficulty moving from lying on back to sitting on side of bed?: None  How much help from another person moving to and from a bed to a chair?: None  How much help from another person needed to walk in hospital room?: A Little  How much help from another person for climbing 3-5 steps with a railing?: A Little  AM-PAC Inpatient Mobility Raw Score : 22  AM-PAC Inpatient T-Scale Score : 53.28  Mobility Inpatient CMS 0-100% Score: 20.91  Mobility Inpatient CMS G-Code Modifier : [address] cleared patient for PT treatment. Nursing wants patient to complete O2 walking test in hallway today. Patient sitting edge of bed at start of session. OBJECTIVE;   Initial Evaluation  Date: 2/14/2022 Treatment Date:  2/15/2022     Short Term/ Long Term   Goals   Was pt agreeable to Eval/treatment? Yes yes To be met in 4 days   Pain level 5/10 headache 9/10 headache    Bed Mobility    Rolling: Independent    Supine to sit: Independent    Sit to supine: Independent    Scooting: Independent   Rolling: Independent   Supine to sit:  Independent   Sit to supine: Independent   Scooting: Independent    Rolling: Independent    Supine to sit: Independent    Sit to supine: Independent    Scooting: Independent     Transfers Sit to stand: Supervision   Sit to stand: Independent      Sit to stand: Independent    Ambulation    2x25 feet using  no device with Minimal assist of 1   cues for sequencing, safety, pacing and pursed lip breathing 50 feet, 100 feet using  no device with Minimal assist of 1   cues for safety and pursed lip breathing    75 feet using  no device with Independent    Stair negotiation: ascended and descended   Not assessed  Not assessed      10 steps with rail supervision   ROM Within functional limits    Increase range of motion 10% of affected joints    Strength BUE:  4/5  RLE:  4/5  LLE:  4/5  Increase strength in affected mm groups by 1/3 grade   Balance Sitting EOB:  good   Dynamic Standing:  fair  Sitting EOB: good   Dynamic Standing: fair +   Sitting EOB:  good   Dynamic Standing: good      Patient is Alert & Oriented x person, place, time and situation and follows directions    Sensation:  Patient  denies numbness/tingling   Edema:  no   Endurance: poor +    Vitals: 4 liters high flow nasal cannula 4-6L with walking   Blood Pressure at rest  Blood Pressure during session    Heart Rate at rest  Heart Rate during session    SPO2 at rest 92%  SPO2 during session 85-91% (4-6L)     Patient education  Patient educated on role of Physical Therapy, risks of immobility, safety and plan of care, energy conservation,  importance of mobility while in hospital , purse lip breathing, ankle pumps, quad set and glut set for edema control, blood clot prevention, safety  and seated exercises     Patient response to education:   Pt verbalized understanding Pt demonstrated skill Pt requires further education in this area   Yes Partial Yes      Treatment:  Patient practiced and was instructed/facilitated in the following treatment: Patient stood from bed to amb in hallway as noted above.  Patient requires standing rest during ambulation for SPO2 levels to recover. Patient returned to room and back to bed. Therapeutic Exercises:  not performed  x reps. At end of session, patient in bed with respiratory therapist present call light and phone within reach,  all lines and tubes intact, nursing notified. Patient would benefit from continued skilled Physical Therapy to improve functional independence and quality of life.          Patient's/ family goals   home    Time in  1034  Time out  1057    Total Treatment Time  23 minutes    CPT codes:  Therapeutic activities (29413)   8 minutes  1 unit(s)  Gait Training (21821) 15 minutes 1 unit(s)  Price Irizarry, [facility]  #[id number]

## 2022-02-15 NOTE — PROGRESS NOTES
Patient on room air at rest, pulse ox 85%. Applied oxygen NC 4L while at rest, pulse ox 91%. Ambulated 50 feet on 4L, pulse ox dropped to 86%. Increased to 6L and walked 50 feet back to the room, pulse ox 88-90%. Put patient back on 4L.

## 2022-02-16 ENCOUNTER — CARE COORDINATION (OUTPATIENT)
Dept: CARE COORDINATION | Age: 72
End: 2022-02-16

## 2022-02-16 LAB
CULTURE, RESPIRATORY: NORMAL
SMEAR, RESPIRATORY: NORMAL

## 2022-02-16 NOTE — PROGRESS NOTES
CLINICAL PHARMACY NOTE: MEDS TO BEDS    Total # of Prescriptions Filled: 8   The following medications were delivered to the patient:  · Combivent Respimat  · Mucus Relief 600mg  · Fluticasone nasal spray  · Atorvastatin 40mg  · Nicotine 7mg/24hr patches  · Aspirin 81mg chewable  · Prednisone 20mg  · Hydroxyzine 10mg    Additional Documentation:

## 2022-02-16 NOTE — CARE COORDINATION
[facility] Transitions Initial Follow Up Call    Call within 2 business days of discharge: Yes    Patient: Madie Lopez Patient : 1950   MRN: <[id number]>  Reason for Admission: -2/15/22 Acute Hypoxemic Respiratory Failure d/t Covid-19  Discharge Date: 2/15/22 RARS: Readmission Risk Score: 10.2 ( )      Last Discharge [facility]       Complaint Diagnosis Description Type Department Provider    22 Shortness of Breath; Cough COVID-19 . .. ED to Hosp-Admission (Discharged) (ADMITTED) Cody Rea MD; Lenny Wilkins. .. Spoke with: Patient    Maya Carias reports cough with yellowish sputum, fatigue and weakness. Pt on O2 @ 4L NC. Advised Pt to purchase a Pulse Oximeter to monitor saturation. Pt v/u. Discussed Pulse Ox d/c Instructions, Incentive Spirometer use, Deep Breathing Exercises, and Covid Zone Tool Education. Pt v/u. Pt states appetite is good and is drinking plenty of fluids. Pt continues with Steroid Therapy. Medication Review completed. Pt lives with Granddaughter's family. Pt has PCP appt scheduled. Pt denies Transportation, Home, or Medication needs. CTN information given, will continue to monitor. Facility: [facility]    Transitions of Care Initial Call    Was this an external facility discharge? No Discharge Facility:     Challenges to be reviewed by the provider   Additional needs identified to be addressed with provider: No  none       Method of communication with provider : none      Advance Care Planning:   Does patient have an Advance Directive: not addressed. Was this a readmission? No  Patient stated reason for admission: SOB  Patients top risk factors for readmission: lack of knowledge about disease  medical condition-Covid-19    Care Transition Nurse (CTN) contacted the patient by telephone to perform post hospital discharge assessment. Verified name and  with patient as identifiers.  Provided introduction to self, and explanation of the CTN role. CTN reviewed discharge instructions, medical action plan and red flags with patient who verbalized understanding. Patient given an opportunity to ask questions and does not have any further questions or concerns at this time. Were discharge instructions available to patient? Yes. Reviewed appropriate site of care based on symptoms and resources available to patient including: PCP and When to call 911. The patient agrees to contact the PCP office for questions related to their healthcare. Medication reconciliation was performed with patient, who verbalizes understanding of administration of home medications. Advised obtaining a 90-day supply of all daily and as-needed medications. Covid Risk Education     Educated patient about risk for severe COVID-19 due to risk factors according to CDC guidelines. CTN reviewed discharge instructions, medical action plan and red flag symptoms with the patient who verbalized understanding. Discussed COVID vaccination status: Yes. Education provided on COVID-19 vaccination as appropriate. Discussed exposure protocols and quarantine with CDC Guidelines. Patient was given an opportunity to verbalize any questions and concerns and agrees to contact CTN or health care provider for questions related to their healthcare. Reviewed and educated patient on any new and changed medications related to discharge diagnosis. Was patient discharged with a pulse oximeter? No Discussed and confirmed pulse oximeter discharge instructions and when to notify provider or seek emergency care. CTN provided contact information. Plan for follow-up call in 5-7 days based on severity of symptoms and risk factors.   Plan for next call: symptom management-cough, fatigue, weakness  follow up appointment-PCP        Care Transitions 24 Hour Call    Care Transitions Interventions         Follow Up  Future Appointments   Date Time Provider Kandy Caamcho   2/22/2022  9:45 AM Richard Sawyer Arleen Momin, [facility], LPN

## 2022-02-18 LAB
BLOOD CULTURE, ROUTINE: NORMAL
CULTURE, BLOOD 2: NORMAL

## 2022-02-24 ENCOUNTER — CARE COORDINATION (OUTPATIENT)
Dept: CARE COORDINATION | Age: 72
End: 2022-02-24

## 2022-02-24 NOTE — CARE COORDINATION
Patient contacted regarding COVID-19 diagnosis. Discussed COVID-19 related testing which was available at this time. Test results were positive. Patient informed of results, if available? Yes and patient already aware    Care Transition Nurse contacted the patient by telephone to perform follow-up assessment. Verified name and  with patient as identifiers. Patient has following risk factors of: no known risk factors. Symptoms reviewed with patient who verbalized the following symptoms: no new symptoms. Due to no new or worsening symptoms encounter was not routed to provider for escalation. Educated patient about risk for severe COVID-19 due to risk factors according to CDC guidelines. CTN reviewed discharge instructions, medical action plan and red flag symptoms with the patient who verbalized understanding. Discussed COVID vaccination status: No. Education provided on COVID-19 vaccination as appropriate. Discussed exposure protocols and quarantine with CDC Guidelines. Patient was given an opportunity to verbalize any questions and concerns and agrees to contact CTN or health care provider for questions related to their healthcare. Was patient discharged with a pulse oximeter? No Discussed and confirmed pulse oximeter discharge instructions and when to notify provider or seek emergency care. Spoke with [facility] [place] who states she is doing better. Patient did state that she is feeling better and her cough is gone. She has not purchased a pulse oximeter at this time. She continues using her oxygen at 4 lpm and stated that she is using her old concentrator and said it doesn't feel like its working right. Spoke with Obed Tarango at [facility] who stated that they may be able to swap out her concentrators. She contacted patient who stated she didn't know what she needed and that she needed everything.   Patient was discharged home with 2 portable tanks all equipment and tubing as well as she had a larger tank delivered on the 16th. CTN attempted to contact patient again and patient's VM is not set up. Will attempt to contact patient again. CTN provided contact information. Plan for follow-up call in 3-5 days based on severity of symptoms and risk factors.

## 2022-02-25 ENCOUNTER — TELEPHONE (OUTPATIENT)
Dept: CARE COORDINATION | Age: 72
End: 2022-02-25

## 2022-02-25 NOTE — TELEPHONE ENCOUNTER
Attempted to contact patient regarding her oxygen needs from conversation yesterday with [facility]. Unable to leave a message, voicemail not set up. Will attempt to contact patient again.       Elisa Mckeon LPN  Care Transitions Nurse  [phone]

## 2022-03-17 ENCOUNTER — OFFICE VISIT (OUTPATIENT)
Dept: FAMILY MEDICINE CLINIC | Age: 72
End: 2022-03-17
Payer: COMMERCIAL

## 2022-03-17 ENCOUNTER — HOSPITAL ENCOUNTER (OUTPATIENT)
Age: 72
Discharge: HOME | End: 2022-03-17
Payer: COMMERCIAL

## 2022-03-17 VITALS
BODY MASS INDEX: 18.96 KG/M2 | HEART RATE: 84 BPM | DIASTOLIC BLOOD PRESSURE: 60 MMHG | RESPIRATION RATE: 20 BRPM | WEIGHT: 113.8 LBS | HEIGHT: 65 IN | OXYGEN SATURATION: 94 % | SYSTOLIC BLOOD PRESSURE: 110 MMHG | TEMPERATURE: 98.3 F

## 2022-03-17 DIAGNOSIS — U07.1 COVID: Primary | ICD-10-CM

## 2022-03-17 DIAGNOSIS — D72.829 LEUKOCYTOSIS, UNSPECIFIED TYPE: ICD-10-CM

## 2022-03-17 DIAGNOSIS — I25.10 CORONARY ARTERY DISEASE INVOLVING NATIVE CORONARY ARTERY OF NATIVE HEART WITHOUT ANGINA PECTORIS: ICD-10-CM

## 2022-03-17 DIAGNOSIS — J30.9 ALLERGIC RHINITIS, UNSPECIFIED SEASONALITY, UNSPECIFIED TRIGGER: ICD-10-CM

## 2022-03-17 DIAGNOSIS — R10.84 ABDOMINAL PAIN, GENERALIZED: ICD-10-CM

## 2022-03-17 DIAGNOSIS — E78.5 HYPERLIPIDEMIA, UNSPECIFIED HYPERLIPIDEMIA TYPE: ICD-10-CM

## 2022-03-17 DIAGNOSIS — J44.9 CHRONIC OBSTRUCTIVE PULMONARY DISEASE, UNSPECIFIED COPD TYPE (HCC): ICD-10-CM

## 2022-03-17 DIAGNOSIS — G47.00 INSOMNIA, UNSPECIFIED TYPE: ICD-10-CM

## 2022-03-17 LAB
BASOPHILS ABSOLUTE: 0.05 E9/L (ref 0–0.2)
BASOPHILS RELATIVE PERCENT: 0.5 % (ref 0–2)
CHOLESTEROL, TOTAL: 126 MG/DL (ref 0–199)
EOSINOPHILS ABSOLUTE: 0.09 E9/L (ref 0.05–0.5)
EOSINOPHILS RELATIVE PERCENT: 0.9 % (ref 0–6)
HCT VFR BLD CALC: 39 % (ref 34–48)
HDLC SERPL-MCNC: 42 MG/DL
HEMOGLOBIN: 12.4 G/DL (ref 11.5–15.5)
IMMATURE GRANULOCYTES #: 0.02 E9/L
IMMATURE GRANULOCYTES %: 0.2 % (ref 0–5)
LDL CHOLESTEROL CALCULATED: 68 MG/DL (ref 0–99)
LYMPHOCYTES ABSOLUTE: 1.16 E9/L (ref 1.5–4)
LYMPHOCYTES RELATIVE PERCENT: 11.3 % (ref 20–42)
MCH RBC QN AUTO: 30.3 PG (ref 26–35)
MCHC RBC AUTO-ENTMCNC: 31.8 % (ref 32–34.5)
MCV RBC AUTO: 95.4 FL (ref 80–99.9)
MONOCYTES ABSOLUTE: 0.68 E9/L (ref 0.1–0.95)
MONOCYTES RELATIVE PERCENT: 6.6 % (ref 2–12)
NEUTROPHILS ABSOLUTE: 8.27 E9/L (ref 1.8–7.3)
NEUTROPHILS RELATIVE PERCENT: 80.5 % (ref 43–80)
PDW BLD-RTO: 14.5 FL (ref 11.5–15)
PLATELET # BLD: 319 E9/L (ref 130–450)
PMV BLD AUTO: 9.9 FL (ref 7–12)
RBC # BLD: 4.09 E12/L (ref 3.5–5.5)
TRIGL SERPL-MCNC: 78 MG/DL (ref 0–149)
TSH SERPL DL<=0.05 MIU/L-ACNC: 1.18 UIU/ML (ref 0.27–4.2)
VLDLC SERPL CALC-MCNC: 16 MG/DL
WBC # BLD: 10.3 E9/L (ref 4.5–11.5)

## 2022-03-17 PROCEDURE — G8400 PT W/DXA NO RESULTS DOC: HCPCS | Performed by: FAMILY MEDICINE

## 2022-03-17 PROCEDURE — 4004F PT TOBACCO SCREEN RCVD TLK: CPT | Performed by: FAMILY MEDICINE

## 2022-03-17 PROCEDURE — G8484 FLU IMMUNIZE NO ADMIN: HCPCS | Performed by: FAMILY MEDICINE

## 2022-03-17 PROCEDURE — 1123F ACP DISCUSS/DSCN MKR DOCD: CPT | Performed by: FAMILY MEDICINE

## 2022-03-17 PROCEDURE — 1111F DSCHRG MED/CURRENT MED MERGE: CPT | Performed by: FAMILY MEDICINE

## 2022-03-17 PROCEDURE — 3017F COLORECTAL CA SCREEN DOC REV: CPT | Performed by: FAMILY MEDICINE

## 2022-03-17 PROCEDURE — 84443 ASSAY THYROID STIM HORMONE: CPT

## 2022-03-17 PROCEDURE — 3023F SPIROM DOC REV: CPT | Performed by: FAMILY MEDICINE

## 2022-03-17 PROCEDURE — 36415 COLL VENOUS BLD VENIPUNCTURE: CPT

## 2022-03-17 PROCEDURE — 99215 OFFICE O/P EST HI 40 MIN: CPT | Performed by: FAMILY MEDICINE

## 2022-03-17 PROCEDURE — G8427 DOCREV CUR MEDS BY ELIG CLIN: HCPCS | Performed by: FAMILY MEDICINE

## 2022-03-17 PROCEDURE — G8420 CALC BMI NORM PARAMETERS: HCPCS | Performed by: FAMILY MEDICINE

## 2022-03-17 PROCEDURE — 80061 LIPID PANEL: CPT

## 2022-03-17 PROCEDURE — 85025 COMPLETE CBC W/AUTO DIFF WBC: CPT

## 2022-03-17 PROCEDURE — 4040F PNEUMOC VAC/ADMIN/RCVD: CPT | Performed by: FAMILY MEDICINE

## 2022-03-17 PROCEDURE — 1090F PRES/ABSN URINE INCON ASSESS: CPT | Performed by: FAMILY MEDICINE

## 2022-03-17 RX ORDER — ASPIRIN 81 MG/1
81 TABLET, CHEWABLE ORAL DAILY
Qty: 90 TABLET | Refills: 3 | Status: SHIPPED | OUTPATIENT
Start: 2022-03-17

## 2022-03-17 RX ORDER — MECOBALAMIN 5000 MCG
5 TABLET,DISINTEGRATING ORAL NIGHTLY
Qty: 90 TABLET | Refills: 0 | Status: SHIPPED | OUTPATIENT
Start: 2022-03-17 | End: 2022-04-16

## 2022-03-17 RX ORDER — ATORVASTATIN CALCIUM 40 MG/1
40 TABLET, FILM COATED ORAL NIGHTLY
Qty: 90 TABLET | Refills: 1 | Status: SHIPPED | OUTPATIENT
Start: 2022-03-17

## 2022-03-17 RX ORDER — FLUTICASONE PROPIONATE 50 MCG
2 SPRAY, SUSPENSION (ML) NASAL DAILY
Qty: 3 EACH | Refills: 3 | Status: SHIPPED | OUTPATIENT
Start: 2022-03-17

## 2022-03-17 RX ORDER — MIRTAZAPINE 15 MG/1
15 TABLET, FILM COATED ORAL DAILY
COMMUNITY
Start: 2022-01-01 | End: 2022-03-17 | Stop reason: DRUGHIGH

## 2022-03-17 SDOH — ECONOMIC STABILITY: FOOD INSECURITY: WITHIN THE PAST 12 MONTHS, THE FOOD YOU BOUGHT JUST DIDN'T LAST AND YOU DIDN'T HAVE MONEY TO GET MORE.: NEVER TRUE

## 2022-03-17 SDOH — ECONOMIC STABILITY: FOOD INSECURITY: WITHIN THE PAST 12 MONTHS, YOU WORRIED THAT YOUR FOOD WOULD RUN OUT BEFORE YOU GOT MONEY TO BUY MORE.: NEVER TRUE

## 2022-03-17 ASSESSMENT — PATIENT HEALTH QUESTIONNAIRE - PHQ9
SUM OF ALL RESPONSES TO PHQ9 QUESTIONS 1 & 2: 0
3. TROUBLE FALLING OR STAYING ASLEEP: 0
1. LITTLE INTEREST OR PLEASURE IN DOING THINGS: 0
10. IF YOU CHECKED OFF ANY PROBLEMS, HOW DIFFICULT HAVE THESE PROBLEMS MADE IT FOR YOU TO DO YOUR WORK, TAKE CARE OF THINGS AT HOME, OR GET ALONG WITH OTHER PEOPLE: 0
SUM OF ALL RESPONSES TO PHQ QUESTIONS 1-9: 1
7. TROUBLE CONCENTRATING ON THINGS, SUCH AS READING THE NEWSPAPER OR WATCHING TELEVISION: 0
2. FEELING DOWN, DEPRESSED OR HOPELESS: 0
SUM OF ALL RESPONSES TO PHQ QUESTIONS 1-9: 1
9. THOUGHTS THAT YOU WOULD BE BETTER OFF DEAD, OR OF HURTING YOURSELF: 0
SUM OF ALL RESPONSES TO PHQ QUESTIONS 1-9: 1
6. FEELING BAD ABOUT YOURSELF - OR THAT YOU ARE A FAILURE OR HAVE LET YOURSELF OR YOUR FAMILY DOWN: 0
8. MOVING OR SPEAKING SO SLOWLY THAT OTHER PEOPLE COULD HAVE NOTICED. OR THE OPPOSITE, BEING SO FIGETY OR RESTLESS THAT YOU HAVE BEEN MOVING AROUND A LOT MORE THAN USUAL: 0
5. POOR APPETITE OR OVEREATING: 0
4. FEELING TIRED OR HAVING LITTLE ENERGY: 1
SUM OF ALL RESPONSES TO PHQ QUESTIONS 1-9: 1

## 2022-03-17 ASSESSMENT — SOCIAL DETERMINANTS OF HEALTH (SDOH): HOW HARD IS IT FOR YOU TO PAY FOR THE VERY BASICS LIKE FOOD, HOUSING, MEDICAL CARE, AND HEATING?: NOT HARD AT ALL

## 2022-03-20 PROBLEM — J30.9 ALLERGIC RHINITIS: Status: ACTIVE | Noted: 2022-03-20

## 2022-03-20 PROBLEM — E78.5 HYPERLIPIDEMIA: Status: ACTIVE | Noted: 2022-03-20

## 2022-03-20 PROBLEM — J44.9 CHRONIC OBSTRUCTIVE PULMONARY DISEASE (HCC): Status: ACTIVE | Noted: 2022-03-20

## 2022-03-20 PROBLEM — G47.00 INSOMNIA: Status: ACTIVE | Noted: 2022-03-20

## 2022-03-20 ASSESSMENT — ENCOUNTER SYMPTOMS
SHORTNESS OF BREATH: 0
GASTROINTESTINAL NEGATIVE: 1
RHINORRHEA: 0
COUGH: 0
EYE REDNESS: 0
SINUS PAIN: 0
EYE DISCHARGE: 0
PHOTOPHOBIA: 0

## 2022-03-20 NOTE — PROGRESS NOTES
3/20/2022    [facility]    Chief Complaint   Patient presents with   [facility].     Patient is here to establish. Has not had pcp in a while. HPI  History was obtained from patient  Analia Medrano is a 73 y.o. female who presents today with the followin. COVID    2. Coronary artery disease involving native coronary artery of native heart without angina pectoris    3. Hyperlipidemia, unspecified hyperlipidemia type    4. Allergic rhinitis, unspecified seasonality, unspecified trigger    5. Insomnia, unspecified type    6. Chronic obstructive pulmonary disease, unspecified COPD type (HCC)    7. Leukocytosis, unspecified type    Patient presents to establish primary care. Patient does have a history of coronary artery disease status post stent placement. Since that time she has had no chest pain. Patient has been taking her medications as prescribed including Combivent, atorvastatin. She has a mental health provider who prescribes her Suboxone Remeron and BuSpar. Was hospitalized last month with respiratory failure from Covid. Patient states that she no longer needs supplemental oxygen and is almost back to her baseline from a respiratory standpoint. Last CBC at the time of discharge from hospital last month showed a WBC of 19.5. She was also mildly anemic with a hemoglobin of 11.3. REVIEW OF SYMPTOMS    Review of Systems   Constitutional: Negative for chills, fatigue and fever. HENT: Negative for congestion, mouth sores, postnasal drip, rhinorrhea and sinus pain. Eyes: Negative for photophobia, discharge and redness. Respiratory: Negative for cough and shortness of breath. Cardiovascular: Negative for chest pain. Gastrointestinal: Negative. Genitourinary: Negative for difficulty urinating. Neurological: Negative for headaches. Psychiatric/Behavioral: Negative for sleep disturbance.        PAST MEDICAL HISTORY  Past Medical History:   Diagnosis Date    Anxiety     Back pain, chronic     CAD (coronary artery disease)     Depression     History of cardiovascular stress test 1-    LEXISCAN STRESS    Hx of blood clots     Hyperlipemia     Hypertension     Pancreatitis        FAMILY HISTORY  Family History   Problem Relation Age of Onset    Cancer Brother         Prostate    Other Son         Sarcoma       SOCIAL HISTORY  Social History     Socioeconomic History    Marital status:      Spouse name: None    Number of children: None    Years of education: None    Highest education level: None   Occupational History    None   Tobacco Use    Smoking status: Current Every Day Smoker     Packs/day: 0.25     Years: 56.00     Pack years: 14.00     Types: Cigarettes     Start date: 5    Smokeless tobacco: Never Used   Vaping Use    Vaping Use: Never used   Substance and Sexual Activity    Alcohol use: Not Currently    Drug use: No    Sexual activity: Never   Other Topics Concern    None   Social History Narrative    None     Social Determinants of Health     Financial Resource Strain: Low Risk     Difficulty of Paying Living Expenses: Not hard at all   Food Insecurity: No Food Insecurity    Worried About Running Out of Food in the Last Year: Never true    Pauline of Food in the Last Year: Never true   Transportation Needs:     Lack of Transportation (Medical): Not on file    Lack of Transportation (Non-Medical):  Not on file   Physical Activity:     Days of Exercise per Week: Not on file    Minutes of Exercise per Session: Not on file   Stress:     Feeling of Stress : Not on file   Social Connections:     Frequency of Communication with Friends and Family: Not on file    Frequency of Social Gatherings with Friends and Family: Not on file    Attends Episcopal Services: Not on file    Active Member of Clubs or Organizations: Not on file    Attends Club or Organization Meetings: Not on file    Marital Status: Not on file   Intimate Partner Violence:     Fear of Current or Ex-Partner: Not on file    Emotionally Abused: Not on file    Physically Abused: Not on file    Sexually Abused: Not on file   Housing Stability:     Unable to Pay for Housing in the Last Year: Not on file    Number of Places Lived in the Last Year: Not on file    Unstable Housing in the Last Year: Not on file        SURGICAL HISTORY  Past Surgical History:   Procedure Laterality Date    [place] ECHO COMPL [address]  1/10/2013         HAND SURGERY Right 10/12/2019    HAND INCISION AND DRAINAGE performed by Angela Natarajan DO at [address]                    CURRENT MEDICATIONS  Current Outpatient Medications   Medication Sig Dispense Refill    atorvastatin (LIPITOR) 40 MG tablet Take 1 tablet by mouth nightly 90 tablet 1    aspirin 81 MG chewable tablet Take 1 tablet by mouth daily 90 tablet 3    melatonin 5 MG TBDP disintegrating tablet Take 1 tablet by mouth nightly 90 tablet 0    fluticasone (FLONASE) 50 MCG/ACT nasal spray 2 sprays by Each Nostril route daily 3 each 3    albuterol-ipratropium (COMBIVENT RESPIMAT)  MCG/ACT AERS inhaler Inhale 1 puff into the lungs every 6 hours as needed for Wheezing or Shortness of Breath 3 each 3    mirtazapine (REMERON) 30 MG tablet Take 30 mg by mouth nightly      busPIRone (BUSPAR) 5 MG tablet Take 5 mg by mouth 2 times daily      buprenorphine-naloxone (SUBOXONE) 8-2 MG FILM SL film Place 1 Film under the tongue 3 times daily. Friday       No current facility-administered medications for this visit. ALLERGIES  Allergies   Allergen Reactions    Demerol [Meperidine Hcl] Hives     After iv dose administered pt had hives from IV site to armpit.      Ultram [Tramadol] Nausea Only    Compazine [Prochlorperazine] Anxiety    Ibuprofen Itching and Rash    Toradol [Ketorolac Tromethamine] Nausea And Vomiting    Tylenol With Codeine #3 [Acetaminophen-Codeine] Nausea And Vomiting       PHYSICAL EXAM    /60   Pulse 84   Temp 98.3 °F (36.8 °C)   Resp 20   Ht 5' 5\" (1.651 m)   Wt 113 lb 12.8 oz (51.6 kg)   SpO2 94%   BMI 18.94 kg/m²     Physical Exam  Vitals and nursing note reviewed. Constitutional:       Appearance: Normal appearance. HENT:      Head: Normocephalic and atraumatic. Nose: No congestion or rhinorrhea. Mouth/Throat:      Mouth: Mucous membranes are moist.      Pharynx: Oropharynx is clear. Eyes:      General: No scleral icterus. Conjunctiva/sclera: Conjunctivae normal.   Cardiovascular:      Rate and Rhythm: Normal rate and regular rhythm. Heart sounds: Normal heart sounds. Pulmonary:      Effort: Pulmonary effort is normal.      Breath sounds: Normal breath sounds. Abdominal:      Palpations: Abdomen is soft. Musculoskeletal:      Cervical back: Neck supple. Skin:     General: Skin is warm. Neurological:      Mental Status: She is alert and oriented to person, place, and time. Psychiatric:         Mood and Affect: Mood normal.         ASSESSMENT & Sherial Jaspal was seen today for establish care. Diagnoses and all orders for this visit:    COVID    Coronary artery disease involving native coronary artery of native heart without angina pectoris  -     aspirin 81 MG chewable tablet; Take 1 tablet by mouth daily  -     TSH; Future    Hyperlipidemia, unspecified hyperlipidemia type  -     atorvastatin (LIPITOR) 40 MG tablet; Take 1 tablet by mouth nightly  -     LIPID PANEL; Future    Allergic rhinitis, unspecified seasonality, unspecified trigger  -     fluticasone (FLONASE) 50 MCG/ACT nasal spray; 2 sprays by Each Nostril route daily    Insomnia, unspecified type  -     melatonin 5 MG TBDP disintegrating tablet;  Take 1 tablet by mouth nightly    Chronic obstructive pulmonary disease, unspecified COPD type ([facility]  -     albuterol-ipratropium (COMBIVENT RESPIMAT)  MCG/ACT AERS inhaler; Inhale 1 puff into the lungs every 6 hours as needed for Wheezing or Shortness of Breath    Leukocytosis, unspecified type  -     Cancel: CBC with Auto Differential; Future    Patient will have labs done today and will be called with those results when available. She is to continue with her current medications. Patient is advised to quit smoking although she is down to 1/4 pack a day. Return in about 2 months (around 5/17/2022). Electronically signed by Edda Monique.  Macario Wright DO on 3/20/2022

## 2022-09-22 ENCOUNTER — TELEPHONE (OUTPATIENT)
Dept: CASE MANAGEMENT | Age: 72
End: 2022-09-22

## 2022-09-22 NOTE — TELEPHONE ENCOUNTER
I called the patient and left a message reminding her of the  CT lung screening at [address] [place] [address] on 9/23/2022 at 8:30 am with an 8:00 am arrival.  If unable to keep this appt call the office at [phone] to get rescheduled.                     Electronically signed by Alethea Arteaga on 9/22/22 at 3:08 PM EDT

## 2022-10-06 DIAGNOSIS — Z12.31 ENCOUNTER FOR SCREENING MAMMOGRAM FOR MALIGNANT NEOPLASM OF BREAST: Primary | ICD-10-CM

## 2023-01-23 ENCOUNTER — COMMUNITY OUTREACH (OUTPATIENT)
Dept: FAMILY MEDICINE CLINIC | Age: 73
End: 2023-01-23

## 2023-01-23 NOTE — PROGRESS NOTES
Patient's HM shows they are overdue for Mammogram, Colorectal Screening and Dexa. Care Everywhere and  files searched.  updated with  colonoscopy. Mammogram and Dexa ordered, not yet completed.

## 2023-02-16 ENCOUNTER — TELEPHONE (OUTPATIENT)
Dept: FAMILY MEDICINE CLINIC | Age: 73
End: 2023-02-16

## 2023-02-16 NOTE — TELEPHONE ENCOUNTER
Called both phone numbers on file for patient so I could schedule her for her O2 recert and they are disconnected.

## 2023-05-10 ENCOUNTER — TELEPHONE (OUTPATIENT)
Dept: NON INVASIVE DIAGNOSTICS | Age: 73
End: 2023-05-10

## 2023-05-25 ENCOUNTER — OFFICE VISIT (OUTPATIENT)
Dept: FAMILY MEDICINE CLINIC | Age: 73
End: 2023-05-25
Payer: COMMERCIAL

## 2023-05-25 VITALS
WEIGHT: 100 LBS | BODY MASS INDEX: 16.66 KG/M2 | HEART RATE: 71 BPM | OXYGEN SATURATION: 94 % | RESPIRATION RATE: 16 BRPM | HEIGHT: 65 IN | DIASTOLIC BLOOD PRESSURE: 62 MMHG | SYSTOLIC BLOOD PRESSURE: 126 MMHG

## 2023-05-25 DIAGNOSIS — I25.10 CORONARY ARTERY DISEASE INVOLVING NATIVE CORONARY ARTERY OF NATIVE HEART WITHOUT ANGINA PECTORIS: ICD-10-CM

## 2023-05-25 DIAGNOSIS — J44.9 CHRONIC OBSTRUCTIVE PULMONARY DISEASE, UNSPECIFIED COPD TYPE (HCC): Primary | ICD-10-CM

## 2023-05-25 DIAGNOSIS — F32.A DEPRESSION, UNSPECIFIED DEPRESSION TYPE: ICD-10-CM

## 2023-05-25 PROCEDURE — 99214 OFFICE O/P EST MOD 30 MIN: CPT | Performed by: FAMILY MEDICINE

## 2023-05-25 PROCEDURE — G8400 PT W/DXA NO RESULTS DOC: HCPCS | Performed by: FAMILY MEDICINE

## 2023-05-25 PROCEDURE — G8419 CALC BMI OUT NRM PARAM NOF/U: HCPCS | Performed by: FAMILY MEDICINE

## 2023-05-25 PROCEDURE — 3023F SPIROM DOC REV: CPT | Performed by: FAMILY MEDICINE

## 2023-05-25 PROCEDURE — 1090F PRES/ABSN URINE INCON ASSESS: CPT | Performed by: FAMILY MEDICINE

## 2023-05-25 PROCEDURE — G8427 DOCREV CUR MEDS BY ELIG CLIN: HCPCS | Performed by: FAMILY MEDICINE

## 2023-05-25 PROCEDURE — 3017F COLORECTAL CA SCREEN DOC REV: CPT | Performed by: FAMILY MEDICINE

## 2023-05-25 PROCEDURE — 4004F PT TOBACCO SCREEN RCVD TLK: CPT | Performed by: FAMILY MEDICINE

## 2023-05-25 PROCEDURE — 1123F ACP DISCUSS/DSCN MKR DOCD: CPT | Performed by: FAMILY MEDICINE

## 2023-05-25 RX ORDER — PREDNISONE 20 MG/1
20 TABLET ORAL DAILY
Qty: 10 TABLET | Refills: 0 | Status: SHIPPED | OUTPATIENT
Start: 2023-05-25 | End: 2023-06-04

## 2023-05-25 RX ORDER — DULOXETIN HYDROCHLORIDE 20 MG/1
20 CAPSULE, DELAYED RELEASE ORAL DAILY
Qty: 30 CAPSULE | Refills: 3 | Status: SHIPPED | OUTPATIENT
Start: 2023-05-25

## 2023-05-25 RX ORDER — ALBUTEROL SULFATE 90 UG/1
AEROSOL, METERED RESPIRATORY (INHALATION)
COMMUNITY
Start: 2023-04-27

## 2023-05-25 RX ORDER — TIOTROPIUM BROMIDE 18 UG/1
18 CAPSULE ORAL; RESPIRATORY (INHALATION) DAILY
Qty: 90 CAPSULE | Refills: 1 | Status: SHIPPED | OUTPATIENT
Start: 2023-05-25

## 2023-05-25 SDOH — ECONOMIC STABILITY: FOOD INSECURITY: WITHIN THE PAST 12 MONTHS, THE FOOD YOU BOUGHT JUST DIDN'T LAST AND YOU DIDN'T HAVE MONEY TO GET MORE.: NEVER TRUE

## 2023-05-25 SDOH — ECONOMIC STABILITY: INCOME INSECURITY: HOW HARD IS IT FOR YOU TO PAY FOR THE VERY BASICS LIKE FOOD, HOUSING, MEDICAL CARE, AND HEATING?: NOT HARD AT ALL

## 2023-05-25 SDOH — ECONOMIC STABILITY: HOUSING INSECURITY
IN THE LAST 12 MONTHS, WAS THERE A TIME WHEN YOU DID NOT HAVE A STEADY PLACE TO SLEEP OR SLEPT IN A SHELTER (INCLUDING NOW)?: NO

## 2023-05-25 SDOH — ECONOMIC STABILITY: FOOD INSECURITY: WITHIN THE PAST 12 MONTHS, YOU WORRIED THAT YOUR FOOD WOULD RUN OUT BEFORE YOU GOT MONEY TO BUY MORE.: NEVER TRUE

## 2023-05-25 ASSESSMENT — PATIENT HEALTH QUESTIONNAIRE - PHQ9
8. MOVING OR SPEAKING SO SLOWLY THAT OTHER PEOPLE COULD HAVE NOTICED. OR THE OPPOSITE, BEING SO FIGETY OR RESTLESS THAT YOU HAVE BEEN MOVING AROUND A LOT MORE THAN USUAL: 0
7. TROUBLE CONCENTRATING ON THINGS, SUCH AS READING THE NEWSPAPER OR WATCHING TELEVISION: 0
5. POOR APPETITE OR OVEREATING: 3
SUM OF ALL RESPONSES TO PHQ QUESTIONS 1-9: 10
2. FEELING DOWN, DEPRESSED OR HOPELESS: 1
9. THOUGHTS THAT YOU WOULD BE BETTER OFF DEAD, OR OF HURTING YOURSELF: 0
10. IF YOU CHECKED OFF ANY PROBLEMS, HOW DIFFICULT HAVE THESE PROBLEMS MADE IT FOR YOU TO DO YOUR WORK, TAKE CARE OF THINGS AT HOME, OR GET ALONG WITH OTHER PEOPLE: 1
4. FEELING TIRED OR HAVING LITTLE ENERGY: 3
SUM OF ALL RESPONSES TO PHQ QUESTIONS 1-9: 10
SUM OF ALL RESPONSES TO PHQ9 QUESTIONS 1 & 2: 1
1. LITTLE INTEREST OR PLEASURE IN DOING THINGS: 0
SUM OF ALL RESPONSES TO PHQ QUESTIONS 1-9: 10
SUM OF ALL RESPONSES TO PHQ QUESTIONS 1-9: 10
3. TROUBLE FALLING OR STAYING ASLEEP: 3
6. FEELING BAD ABOUT YOURSELF - OR THAT YOU ARE A FAILURE OR HAVE LET YOURSELF OR YOUR FAMILY DOWN: 0

## 2023-06-04 ASSESSMENT — ENCOUNTER SYMPTOMS
COUGH: 0
PHOTOPHOBIA: 0
RHINORRHEA: 0
EYE DISCHARGE: 0
GASTROINTESTINAL NEGATIVE: 1
SHORTNESS OF BREATH: 1
EYE REDNESS: 0
SINUS PAIN: 0

## 2023-06-06 ENCOUNTER — HOSPITAL ENCOUNTER (INPATIENT)
Age: 73
LOS: 4 days | Discharge: HOME | End: 2023-06-10
Attending: EMERGENCY MEDICINE | Admitting: INTERNAL MEDICINE
Payer: COMMERCIAL

## 2023-06-06 ENCOUNTER — APPOINTMENT (OUTPATIENT)
Dept: GENERAL RADIOLOGY | Age: 73
End: 2023-06-06
Payer: COMMERCIAL

## 2023-06-06 ENCOUNTER — APPOINTMENT (OUTPATIENT)
Dept: CT IMAGING | Age: 73
End: 2023-06-06
Payer: COMMERCIAL

## 2023-06-06 DIAGNOSIS — Z79.899 POLYPHARMACY: ICD-10-CM

## 2023-06-06 DIAGNOSIS — R09.02 HYPOXIA: ICD-10-CM

## 2023-06-06 DIAGNOSIS — R41.82 ALTERED MENTAL STATUS, UNSPECIFIED ALTERED MENTAL STATUS TYPE: Primary | ICD-10-CM

## 2023-06-06 DIAGNOSIS — J18.9 PNEUMONIA OF BOTH LUNGS DUE TO INFECTIOUS ORGANISM, UNSPECIFIED PART OF LUNG: ICD-10-CM

## 2023-06-06 LAB
ALBUMIN SERPL-MCNC: 3.5 G/DL (ref 3.5–5.2)
ALP SERPL-CCNC: 98 U/L (ref 35–104)
ALT SERPL-CCNC: 12 U/L (ref 0–32)
AMMONIA PLAS-SCNC: 63 UMOL/L (ref 11–51)
AMPHET UR QL SCN: NOT DETECTED
ANION GAP SERPL CALCULATED.3IONS-SCNC: 10 MMOL/L (ref 7–16)
APAP SERPL-MCNC: <5 MCG/ML (ref 10–30)
APTT BLD: 31 SEC (ref 24.5–35.1)
AST SERPL-CCNC: 13 U/L (ref 0–31)
B.E.: 2 MMOL/L (ref -3–3)
BACTERIA URNS QL MICRO: ABNORMAL /HPF
BARBITURATES UR QL SCN: NOT DETECTED
BASOPHILS # BLD: 0 E9/L (ref 0–0.2)
BASOPHILS NFR BLD: 0 % (ref 0–2)
BENZODIAZ UR QL SCN: NOT DETECTED
BETA-HYDROXYBUTYRATE: 0.05 MMOL/L (ref 0.02–0.27)
BILIRUB DIRECT SERPL-MCNC: <0.2 MG/DL (ref 0–0.3)
BILIRUB INDIRECT SERPL-MCNC: NORMAL MG/DL (ref 0–1)
BILIRUB SERPL-MCNC: 0.6 MG/DL (ref 0–1.2)
BILIRUB UR QL STRIP: NEGATIVE
BNP BLD-MCNC: 388 PG/ML (ref 0–125)
BUN SERPL-MCNC: 15 MG/DL (ref 6–23)
CALCIUM SERPL-MCNC: 10.6 MG/DL (ref 8.6–10.2)
CANNABINOIDS UR QL SCN: NOT DETECTED
CHLORIDE SERPL-SCNC: 97 MMOL/L (ref 98–107)
CLARITY UR: CLEAR
CO2 SERPL-SCNC: 25 MMOL/L (ref 22–29)
COCAINE UR QL SCN: NOT DETECTED
COHB: 1.5 % (ref 0–1.5)
COLOR UR: YELLOW
CREAT SERPL-MCNC: 0.4 MG/DL (ref 0.5–1)
CRITICAL: ABNORMAL
DATE ANALYZED: ABNORMAL
DATE OF COLLECTION: ABNORMAL
DRUG SCREEN COMMENT UR-IMP: NORMAL
EOSINOPHIL # BLD: 0 E9/L (ref 0.05–0.5)
EOSINOPHIL NFR BLD: 0 % (ref 0–6)
ERYTHROCYTE [DISTWIDTH] IN BLOOD BY AUTOMATED COUNT: 14.9 FL (ref 11.5–15)
ETHANOLAMINE SERPL-MCNC: <10 MG/DL (ref 0–0.08)
FENTANYL SCREEN, URINE: NOT DETECTED
GLUCOSE SERPL-MCNC: 121 MG/DL (ref 74–99)
GLUCOSE UR STRIP-MCNC: NEGATIVE MG/DL
HCO3: 28 MMOL/L (ref 22–26)
HCT VFR BLD AUTO: 39.9 % (ref 34–48)
HGB BLD-MCNC: 12.5 G/DL (ref 11.5–15.5)
HGB UR QL STRIP: ABNORMAL
HHB: 3.4 % (ref 0–5)
INR BLD: 1.1
KETONES UR STRIP-MCNC: NEGATIVE MG/DL
LAB: ABNORMAL
LACTATE BLDV-SCNC: 0.8 MMOL/L (ref 0.5–1.9)
LACTATE BLDV-SCNC: 1.6 MMOL/L (ref 0.5–1.9)
LEUKOCYTE ESTERASE UR QL STRIP: ABNORMAL
LIPASE: 10 U/L (ref 13–60)
LYMPHOCYTES # BLD: 1.43 E9/L (ref 1.5–4)
LYMPHOCYTES NFR BLD: 5 % (ref 20–42)
Lab: ABNORMAL
MAGNESIUM SERPL-MCNC: 2 MG/DL (ref 1.6–2.6)
MCH RBC QN AUTO: 29.5 PG (ref 26–35)
MCHC RBC AUTO-ENTMCNC: 31.3 % (ref 32–34.5)
MCV RBC AUTO: 94.1 FL (ref 80–99.9)
METHADONE UR QL SCN: NOT DETECTED
METHB: 0.3 % (ref 0–1.5)
MODE: ABNORMAL
MONOCYTES # BLD: 1.43 E9/L (ref 0.1–0.95)
MONOCYTES NFR BLD: 5 % (ref 2–12)
NEUTROPHILS # BLD: 25.74 E9/L (ref 1.8–7.3)
NEUTS SEG NFR BLD: 90 % (ref 43–80)
NITRITE UR QL STRIP: NEGATIVE
O2 CONTENT: 17.8 ML/DL
O2 SATURATION: 96.5 % (ref 92–98.5)
O2HB: 94.8 % (ref 94–97)
OPERATOR ID: 1821
OPIATES UR QL SCN: NOT DETECTED
OVALOCYTES: ABNORMAL
OXYCODONE URINE: NOT DETECTED
PATIENT TEMP: 37 C
PCO2: 49.1 MMHG (ref 35–45)
PCP UR QL SCN: NOT DETECTED
PH BLOOD GAS: 7.37 (ref 7.35–7.45)
PH UR STRIP: 6 [PH] (ref 5–9)
PLATELET # BLD AUTO: 275 E9/L (ref 130–450)
PMV BLD AUTO: 9.8 FL (ref 7–12)
PO2: 92.5 MMHG (ref 75–100)
POIKILOCYTES: ABNORMAL
POTASSIUM SERPL-SCNC: 3.5 MMOL/L (ref 3.5–5)
PROT SERPL-MCNC: 7 G/DL (ref 6.4–8.3)
PROT UR STRIP-MCNC: 30 MG/DL
PROTHROMBIN TIME: 13.1 SEC (ref 9.3–12.4)
RBC # BLD AUTO: 4.24 E12/L (ref 3.5–5.5)
RBC #/AREA URNS HPF: ABNORMAL /HPF (ref 0–2)
SALICYLATES SERPL-MCNC: <0.3 MG/DL (ref 0–30)
SARS-COV-2 RDRP RESP QL NAA+PROBE: NOT DETECTED
SODIUM SERPL-SCNC: 132 MMOL/L (ref 132–146)
SOURCE, BLOOD GAS: ABNORMAL
SP GR UR STRIP: 1.02 (ref 1–1.03)
THB: 13.3 G/DL (ref 11.5–16.5)
TIME ANALYZED: 1722
TROPONIN, HIGH SENSITIVITY: 6 NG/L (ref 0–9)
UROBILINOGEN UR STRIP-ACNC: 0.2 E.U./DL
WBC # BLD: 28.6 E9/L (ref 4.5–11.5)
WBC #/AREA URNS HPF: >20 /HPF (ref 0–5)

## 2023-06-06 PROCEDURE — 2580000003 HC RX 258: Performed by: EMERGENCY MEDICINE

## 2023-06-06 PROCEDURE — 85730 THROMBOPLASTIN TIME PARTIAL: CPT

## 2023-06-06 PROCEDURE — 71250 CT THORAX DX C-: CPT

## 2023-06-06 PROCEDURE — 80143 DRUG ASSAY ACETAMINOPHEN: CPT

## 2023-06-06 PROCEDURE — 80076 HEPATIC FUNCTION PANEL: CPT

## 2023-06-06 PROCEDURE — 96361 HYDRATE IV INFUSION ADD-ON: CPT

## 2023-06-06 PROCEDURE — 96365 THER/PROPH/DIAG IV INF INIT: CPT

## 2023-06-06 PROCEDURE — 6360000002 HC RX W HCPCS: Performed by: EMERGENCY MEDICINE

## 2023-06-06 PROCEDURE — 2500000003 HC RX 250 WO HCPCS: Performed by: EMERGENCY MEDICINE

## 2023-06-06 PROCEDURE — 85025 COMPLETE CBC W/AUTO DIFF WBC: CPT

## 2023-06-06 PROCEDURE — 6370000000 HC RX 637 (ALT 250 FOR IP): Performed by: EMERGENCY MEDICINE

## 2023-06-06 PROCEDURE — 94664 DEMO&/EVAL PT USE INHALER: CPT

## 2023-06-06 PROCEDURE — 87040 BLOOD CULTURE FOR BACTERIA: CPT

## 2023-06-06 PROCEDURE — 82010 KETONE BODYS QUAN: CPT

## 2023-06-06 PROCEDURE — 80307 DRUG TEST PRSMV CHEM ANLYZR: CPT

## 2023-06-06 PROCEDURE — 74177 CT ABD & PELVIS W/CONTRAST: CPT

## 2023-06-06 PROCEDURE — 81001 URINALYSIS AUTO W/SCOPE: CPT

## 2023-06-06 PROCEDURE — 70450 CT HEAD/BRAIN W/O DYE: CPT

## 2023-06-06 PROCEDURE — 83880 ASSAY OF NATRIURETIC PEPTIDE: CPT

## 2023-06-06 PROCEDURE — 82140 ASSAY OF AMMONIA: CPT

## 2023-06-06 PROCEDURE — 82077 ASSAY SPEC XCP UR&BREATH IA: CPT

## 2023-06-06 PROCEDURE — 87635 SARS-COV-2 COVID-19 AMP PRB: CPT

## 2023-06-06 PROCEDURE — 71045 X-RAY EXAM CHEST 1 VIEW: CPT

## 2023-06-06 PROCEDURE — 96375 TX/PRO/DX INJ NEW DRUG ADDON: CPT

## 2023-06-06 PROCEDURE — 99222 1ST HOSP IP/OBS MODERATE 55: CPT | Performed by: INTERNAL MEDICINE

## 2023-06-06 PROCEDURE — 80048 BASIC METABOLIC PNL TOTAL CA: CPT

## 2023-06-06 PROCEDURE — 85610 PROTHROMBIN TIME: CPT

## 2023-06-06 PROCEDURE — 83605 ASSAY OF LACTIC ACID: CPT

## 2023-06-06 PROCEDURE — 6360000004 HC RX CONTRAST MEDICATION: Performed by: RADIOLOGY

## 2023-06-06 PROCEDURE — 36415 COLL VENOUS BLD VENIPUNCTURE: CPT

## 2023-06-06 PROCEDURE — 99285 EMERGENCY DEPT VISIT HI MDM: CPT

## 2023-06-06 PROCEDURE — 82805 BLOOD GASES W/O2 SATURATION: CPT

## 2023-06-06 PROCEDURE — 83690 ASSAY OF LIPASE: CPT

## 2023-06-06 PROCEDURE — 80179 DRUG ASSAY SALICYLATE: CPT

## 2023-06-06 PROCEDURE — 93005 ELECTROCARDIOGRAM TRACING: CPT | Performed by: EMERGENCY MEDICINE

## 2023-06-06 PROCEDURE — 1200000000 HC SEMI PRIVATE

## 2023-06-06 PROCEDURE — 83735 ASSAY OF MAGNESIUM: CPT

## 2023-06-06 PROCEDURE — 84484 ASSAY OF TROPONIN QUANT: CPT

## 2023-06-06 RX ORDER — SODIUM CHLORIDE 0.9 % (FLUSH) 0.9 %
10 SYRINGE (ML) INJECTION PRN
Status: DISCONTINUED | OUTPATIENT
Start: 2023-06-06 | End: 2023-06-10 | Stop reason: HOSPADM

## 2023-06-06 RX ORDER — ACETAMINOPHEN 650 MG/1
650 SUPPOSITORY RECTAL EVERY 6 HOURS PRN
Status: DISCONTINUED | OUTPATIENT
Start: 2023-06-06 | End: 2023-06-10 | Stop reason: HOSPADM

## 2023-06-06 RX ORDER — IPRATROPIUM BROMIDE AND ALBUTEROL SULFATE 2.5; .5 MG/3ML; MG/3ML
2 SOLUTION RESPIRATORY (INHALATION) ONCE
Status: COMPLETED | OUTPATIENT
Start: 2023-06-06 | End: 2023-06-06

## 2023-06-06 RX ORDER — ACETAMINOPHEN 325 MG/1
650 TABLET ORAL EVERY 6 HOURS PRN
Status: DISCONTINUED | OUTPATIENT
Start: 2023-06-06 | End: 2023-06-10 | Stop reason: HOSPADM

## 2023-06-06 RX ORDER — 0.9 % SODIUM CHLORIDE 0.9 %
1000 INTRAVENOUS SOLUTION INTRAVENOUS ONCE
Status: COMPLETED | OUTPATIENT
Start: 2023-06-06 | End: 2023-06-06

## 2023-06-06 RX ORDER — IPRATROPIUM BROMIDE AND ALBUTEROL SULFATE 2.5; .5 MG/3ML; MG/3ML
1 SOLUTION RESPIRATORY (INHALATION)
Status: DISCONTINUED | OUTPATIENT
Start: 2023-06-07 | End: 2023-06-10 | Stop reason: HOSPADM

## 2023-06-06 RX ORDER — PROMETHAZINE HYDROCHLORIDE 25 MG/1
12.5 TABLET ORAL EVERY 6 HOURS PRN
Status: DISCONTINUED | OUTPATIENT
Start: 2023-06-06 | End: 2023-06-10 | Stop reason: HOSPADM

## 2023-06-06 RX ORDER — SODIUM CHLORIDE 0.9 % (FLUSH) 0.9 %
10 SYRINGE (ML) INJECTION EVERY 12 HOURS SCHEDULED
Status: DISCONTINUED | OUTPATIENT
Start: 2023-06-06 | End: 2023-06-10 | Stop reason: HOSPADM

## 2023-06-06 RX ORDER — POLYETHYLENE GLYCOL 3350 17 G/17G
17 POWDER, FOR SOLUTION ORAL DAILY PRN
Status: DISCONTINUED | OUTPATIENT
Start: 2023-06-06 | End: 2023-06-10 | Stop reason: HOSPADM

## 2023-06-06 RX ORDER — SODIUM CHLORIDE 9 MG/ML
INJECTION, SOLUTION INTRAVENOUS PRN
Status: DISCONTINUED | OUTPATIENT
Start: 2023-06-06 | End: 2023-06-10 | Stop reason: HOSPADM

## 2023-06-06 RX ORDER — ONDANSETRON 2 MG/ML
4 INJECTION INTRAMUSCULAR; INTRAVENOUS EVERY 6 HOURS PRN
Status: DISCONTINUED | OUTPATIENT
Start: 2023-06-06 | End: 2023-06-10 | Stop reason: HOSPADM

## 2023-06-06 RX ORDER — ENOXAPARIN SODIUM 100 MG/ML
30 INJECTION SUBCUTANEOUS DAILY
Status: DISCONTINUED | OUTPATIENT
Start: 2023-06-07 | End: 2023-06-10 | Stop reason: HOSPADM

## 2023-06-06 RX ADMIN — IOPAMIDOL 70 ML: 755 INJECTION, SOLUTION INTRAVENOUS at 19:34

## 2023-06-06 RX ADMIN — WATER 2000 MG: 1 INJECTION INTRAMUSCULAR; INTRAVENOUS; SUBCUTANEOUS at 19:01

## 2023-06-06 RX ADMIN — IPRATROPIUM BROMIDE AND ALBUTEROL SULFATE 2 DOSE: .5; 3 SOLUTION RESPIRATORY (INHALATION) at 17:54

## 2023-06-06 RX ADMIN — DOXYCYCLINE 100 MG: 100 INJECTION, POWDER, LYOPHILIZED, FOR SOLUTION INTRAVENOUS at 19:05

## 2023-06-06 RX ADMIN — SODIUM CHLORIDE 1000 ML: 9 INJECTION, SOLUTION INTRAVENOUS at 21:20

## 2023-06-06 RX ADMIN — SODIUM CHLORIDE 1000 ML: 9 INJECTION, SOLUTION INTRAVENOUS at 17:23

## 2023-06-06 NOTE — ED NOTES
Daughter called for an update. Daughter states she will be up shortly to see her.       Jared Rhodes RN  06/06/23 1095

## 2023-06-07 LAB
BASOPHILS # BLD: 0.02 E9/L (ref 0–0.2)
BASOPHILS NFR BLD: 0.1 % (ref 0–2)
EKG ATRIAL RATE: 109 BPM
EKG P AXIS: 23 DEGREES
EKG P-R INTERVAL: 124 MS
EKG Q-T INTERVAL: 326 MS
EKG QRS DURATION: 86 MS
EKG QTC CALCULATION (BAZETT): 439 MS
EKG R AXIS: 69 DEGREES
EKG T AXIS: 18 DEGREES
EKG VENTRICULAR RATE: 109 BPM
EOSINOPHIL # BLD: 0.04 E9/L (ref 0.05–0.5)
EOSINOPHIL NFR BLD: 0.2 % (ref 0–6)
ERYTHROCYTE [DISTWIDTH] IN BLOOD BY AUTOMATED COUNT: 14.9 FL (ref 11.5–15)
HCT VFR BLD AUTO: 35.4 % (ref 34–48)
HGB BLD-MCNC: 11.3 G/DL (ref 11.5–15.5)
IMM GRANULOCYTES # BLD: 0.12 E9/L
IMM GRANULOCYTES NFR BLD: 0.7 % (ref 0–5)
LYMPHOCYTES # BLD: 1.27 E9/L (ref 1.5–4)
LYMPHOCYTES NFR BLD: 7.5 % (ref 20–42)
MCH RBC QN AUTO: 30 PG (ref 26–35)
MCHC RBC AUTO-ENTMCNC: 31.9 % (ref 32–34.5)
MCV RBC AUTO: 93.9 FL (ref 80–99.9)
MONOCYTES # BLD: 1.17 E9/L (ref 0.1–0.95)
MONOCYTES NFR BLD: 6.9 % (ref 2–12)
NEUTROPHILS # BLD: 14.28 E9/L (ref 1.8–7.3)
NEUTS SEG NFR BLD: 84.6 % (ref 43–80)
PLATELET # BLD AUTO: 250 E9/L (ref 130–450)
PMV BLD AUTO: 9.9 FL (ref 7–12)
RBC # BLD AUTO: 3.77 E12/L (ref 3.5–5.5)
WBC # BLD: 16.9 E9/L (ref 4.5–11.5)

## 2023-06-07 PROCEDURE — 93010 ELECTROCARDIOGRAM REPORT: CPT | Performed by: INTERNAL MEDICINE

## 2023-06-07 PROCEDURE — 85025 COMPLETE CBC W/AUTO DIFF WBC: CPT

## 2023-06-07 PROCEDURE — 2500000003 HC RX 250 WO HCPCS: Performed by: INTERNAL MEDICINE

## 2023-06-07 PROCEDURE — 6370000000 HC RX 637 (ALT 250 FOR IP): Performed by: INTERNAL MEDICINE

## 2023-06-07 PROCEDURE — 2580000003 HC RX 258: Performed by: INTERNAL MEDICINE

## 2023-06-07 PROCEDURE — 1200000000 HC SEMI PRIVATE

## 2023-06-07 PROCEDURE — 6360000002 HC RX W HCPCS: Performed by: INTERNAL MEDICINE

## 2023-06-07 PROCEDURE — 36415 COLL VENOUS BLD VENIPUNCTURE: CPT

## 2023-06-07 PROCEDURE — 94640 AIRWAY INHALATION TREATMENT: CPT

## 2023-06-07 PROCEDURE — 99232 SBSQ HOSP IP/OBS MODERATE 35: CPT | Performed by: INTERNAL MEDICINE

## 2023-06-07 RX ORDER — BUPRENORPHINE HYDROCHLORIDE AND NALOXONE HYDROCHLORIDE DIHYDRATE 8; 2 MG/1; MG/1
1 TABLET SUBLINGUAL 3 TIMES DAILY
Status: DISCONTINUED | OUTPATIENT
Start: 2023-06-07 | End: 2023-06-10 | Stop reason: HOSPADM

## 2023-06-07 RX ORDER — METHYLPREDNISOLONE SODIUM SUCCINATE 40 MG/ML
40 INJECTION, POWDER, LYOPHILIZED, FOR SOLUTION INTRAMUSCULAR; INTRAVENOUS EVERY 12 HOURS
Status: DISCONTINUED | OUTPATIENT
Start: 2023-06-07 | End: 2023-06-08

## 2023-06-07 RX ORDER — SODIUM CHLORIDE, SODIUM LACTATE, POTASSIUM CHLORIDE, CALCIUM CHLORIDE 600; 310; 30; 20 MG/100ML; MG/100ML; MG/100ML; MG/100ML
INJECTION, SOLUTION INTRAVENOUS CONTINUOUS
Status: DISCONTINUED | OUTPATIENT
Start: 2023-06-07 | End: 2023-06-10 | Stop reason: HOSPADM

## 2023-06-07 RX ORDER — ATORVASTATIN CALCIUM 40 MG/1
40 TABLET, FILM COATED ORAL NIGHTLY
Status: DISCONTINUED | OUTPATIENT
Start: 2023-06-07 | End: 2023-06-10 | Stop reason: HOSPADM

## 2023-06-07 RX ORDER — ASPIRIN 81 MG/1
81 TABLET, CHEWABLE ORAL DAILY
Status: DISCONTINUED | OUTPATIENT
Start: 2023-06-07 | End: 2023-06-10 | Stop reason: HOSPADM

## 2023-06-07 RX ADMIN — SODIUM CHLORIDE, PRESERVATIVE FREE 10 ML: 5 INJECTION INTRAVENOUS at 05:37

## 2023-06-07 RX ADMIN — ATORVASTATIN CALCIUM 40 MG: 40 TABLET, FILM COATED ORAL at 19:35

## 2023-06-07 RX ADMIN — SODIUM CHLORIDE, PRESERVATIVE FREE 10 ML: 5 INJECTION INTRAVENOUS at 09:02

## 2023-06-07 RX ADMIN — BUPRENORPHINE HYDROCHLORIDE AND NALOXONE HYDROCHLORIDE DIHYDRATE 1 TABLET: 8; 2 TABLET SUBLINGUAL at 13:38

## 2023-06-07 RX ADMIN — ACETAMINOPHEN 650 MG: 325 TABLET ORAL at 19:35

## 2023-06-07 RX ADMIN — SODIUM CHLORIDE, PRESERVATIVE FREE 10 ML: 5 INJECTION INTRAVENOUS at 19:35

## 2023-06-07 RX ADMIN — SODIUM CHLORIDE, POTASSIUM CHLORIDE, SODIUM LACTATE AND CALCIUM CHLORIDE: 600; 310; 30; 20 INJECTION, SOLUTION INTRAVENOUS at 19:40

## 2023-06-07 RX ADMIN — SODIUM CHLORIDE, POTASSIUM CHLORIDE, SODIUM LACTATE AND CALCIUM CHLORIDE: 600; 310; 30; 20 INJECTION, SOLUTION INTRAVENOUS at 07:37

## 2023-06-07 RX ADMIN — CEFTRIAXONE 1000 MG: 1 INJECTION, POWDER, FOR SOLUTION INTRAMUSCULAR; INTRAVENOUS at 09:02

## 2023-06-07 RX ADMIN — ASPIRIN 81 MG: 81 TABLET, CHEWABLE ORAL at 09:02

## 2023-06-07 RX ADMIN — BUPRENORPHINE HYDROCHLORIDE AND NALOXONE HYDROCHLORIDE DIHYDRATE 1 TABLET: 8; 2 TABLET SUBLINGUAL at 09:02

## 2023-06-07 RX ADMIN — IPRATROPIUM BROMIDE AND ALBUTEROL SULFATE 1 DOSE: .5; 2.5 SOLUTION RESPIRATORY (INHALATION) at 19:21

## 2023-06-07 RX ADMIN — DOXYCYCLINE 100 MG: 100 INJECTION, POWDER, LYOPHILIZED, FOR SOLUTION INTRAVENOUS at 17:37

## 2023-06-07 RX ADMIN — IPRATROPIUM BROMIDE AND ALBUTEROL SULFATE 1 DOSE: .5; 2.5 SOLUTION RESPIRATORY (INHALATION) at 04:50

## 2023-06-07 RX ADMIN — IPRATROPIUM BROMIDE AND ALBUTEROL SULFATE 1 DOSE: .5; 2.5 SOLUTION RESPIRATORY (INHALATION) at 15:25

## 2023-06-07 RX ADMIN — ACETAMINOPHEN 650 MG: 325 TABLET ORAL at 03:49

## 2023-06-07 RX ADMIN — DOXYCYCLINE 100 MG: 100 INJECTION, POWDER, LYOPHILIZED, FOR SOLUTION INTRAVENOUS at 05:37

## 2023-06-07 RX ADMIN — IPRATROPIUM BROMIDE AND ALBUTEROL SULFATE 1 DOSE: .5; 2.5 SOLUTION RESPIRATORY (INHALATION) at 09:43

## 2023-06-07 RX ADMIN — ENOXAPARIN SODIUM 30 MG: 100 INJECTION SUBCUTANEOUS at 09:02

## 2023-06-07 RX ADMIN — METHYLPREDNISOLONE SODIUM SUCCINATE 40 MG: 40 INJECTION, POWDER, FOR SOLUTION INTRAMUSCULAR; INTRAVENOUS at 13:37

## 2023-06-07 ASSESSMENT — PAIN DESCRIPTION - PAIN TYPE
TYPE: ACUTE PAIN
TYPE: ACUTE PAIN

## 2023-06-07 ASSESSMENT — PAIN DESCRIPTION - DESCRIPTORS
DESCRIPTORS: ACHING;DISCOMFORT
DESCRIPTORS: ACHING;DISCOMFORT

## 2023-06-07 ASSESSMENT — PAIN DESCRIPTION - ORIENTATION
ORIENTATION: RIGHT
ORIENTATION: MID

## 2023-06-07 ASSESSMENT — PAIN DESCRIPTION - LOCATION
LOCATION: BACK
LOCATION: BACK

## 2023-06-07 ASSESSMENT — PAIN SCALES - GENERAL
PAINLEVEL_OUTOF10: 6
PAINLEVEL_OUTOF10: 8

## 2023-06-07 NOTE — ACP (ADVANCE CARE PLANNING)
Advance Care Planning   Healthcare Decision Maker:    Primary Decision Maker: Nguyen Dumont  [phone]    Secondary Decision Maker: Annette Tee - [phone]

## 2023-06-07 NOTE — CARE COORDINATION
Case Management Assessment  Initial Evaluation    Date/Time of Evaluation: 6/7/2023 12:01 PM  Assessment Completed by: Brandy Garcia RN    If patient is discharged prior to next notation, then this note serves as note for discharge by case management. Patient Name: Anyi Garrett                   YOB: 1950  Diagnosis: Altered mental status [R41.82]  Polypharmacy [Z79.899]  Hypoxia [R09.02]  Altered mental status, unspecified altered mental status type [R41.82]  Pneumonia of both lungs due to infectious organism, unspecified part of lung [J18.9]                   Date / Time: 6/6/2023  3:46 PM    Patient Admission Status: Inpatient   Readmission Risk (Low < 19, Mod (19-27), High > 27): Readmission Risk Score: 12.6    Current PCP: Pepe Dasilva. Brendan Castelan, DO  PCP verified by CM? Yes    Chart Reviewed: Yes      History Provided by: Patient  Patient Orientation: Alert and Oriented, Person, Place, Situation    Patient Cognition: Alert    Hospitalization in the last 30 days (Readmission):  No      Advance Directives:      Code Status: Full Code   Patient's Primary Decision Maker is: Legal Next of Kin    Primary Decision MakerGuerita Dumont - [phone]    Secondary Decision Maker: Rubio Barber - [phone]    Discharge Planning:    Patient lives with: Other (Comment) (grand daughter and ) Type of Home: House  Primary Care Giver: Self  Patient Support Systems include: Children, Family Members   Current Financial resources: Medicaid  Current community resources:    Current services prior to admission: None            Current DME:              Type of Home Care services:  None    ADLS  Prior functional level: Independent in ADLs/IADLs  Current functional level: Assistance with the following:, Mobility    PT AM-PAC:   /24  OT AM-PAC:   /24    Family can provide assistance at DC:  Yes  Would you like Case Management to discuss the discharge plan with any other family

## 2023-06-07 NOTE — H&P
HISTORY: Additional Contrast?->None Reason for exam:->sepsis, eval renals Decision Support Exception - unselect if not a suspected or confirmed emergency medical condition->Emergency Medical Condition (MA) FINDINGS: Chest: Mediastinum: Thyroid is homogeneous in attenuation. No bulky mediastinal adenopathy. Central airways are patent. Esophagus is normal course and caliber. Patent nonaneurysmal thoracic aorta. Cardiac size mildly enlarged with coronary calcifications however no pericardial effusion Lungs/pleura: Chronic changes of chronic obstructive pulmonary disease with moderate to severe upper lobe predominant centrilobular emphysema. Patchy opacifications in the right lung posteriorly right upper lobe and right middle lobe along with confluent or coalescent dense opacities near entire lobar involvement right lower lobe consistent of bronchopneumonia with only trace right pleural effusion. Soft Tissues/Bones: No acute osseous or soft tissue findings. No aggressive osseous lesion. Abdomen/Pelvis: Organs: Liver without suspicious lesion only a single well-defined fluid density low-attenuation focus in the right hepatic dome series 2, image 19 likely hepatic cyst too small to characterize definitively however. Gallbladder is distended without obvious calculus. Abnormal intrahepatic and extrahepatic biliary dilatation with common bile duct measuring up to 12 mm in diameter at the level the meche hepatis no obvious calculus disease however abnormally dilated along with abnormal dilatation of the main pancreatic duct up to 6 mm. Pancreas and spleen unremarkable. Adrenals without nodule. Kidneys without suspicious renal lesion and no hydronephrosis. GI/Bowel: No focal thickening or disproportion dilatation of bowel. No inflammatory findings. Moderate to large colonic stool burden. Pelvis: No suspicious pelvic lesion or bulky pelvic adenopathy/free fluid.  Moderate to severe distension of the urinary bladder

## 2023-06-08 PROBLEM — E44.0 MODERATE PROTEIN-CALORIE MALNUTRITION (HCC): Status: ACTIVE | Noted: 2023-06-08

## 2023-06-08 LAB
ANION GAP SERPL CALCULATED.3IONS-SCNC: 8 MMOL/L (ref 7–16)
BUN SERPL-MCNC: 16 MG/DL (ref 6–23)
CALCIUM SERPL-MCNC: 9.8 MG/DL (ref 8.6–10.2)
CHLORIDE SERPL-SCNC: 102 MMOL/L (ref 98–107)
CO2 SERPL-SCNC: 26 MMOL/L (ref 22–29)
CREAT SERPL-MCNC: 0.4 MG/DL (ref 0.5–1)
ERYTHROCYTE [DISTWIDTH] IN BLOOD BY AUTOMATED COUNT: 14.9 FL (ref 11.5–15)
GLUCOSE SERPL-MCNC: 141 MG/DL (ref 74–99)
HCT VFR BLD AUTO: 32.9 % (ref 34–48)
HGB BLD-MCNC: 10.7 G/DL (ref 11.5–15.5)
MCH RBC QN AUTO: 30.1 PG (ref 26–35)
MCHC RBC AUTO-ENTMCNC: 32.5 % (ref 32–34.5)
MCV RBC AUTO: 92.4 FL (ref 80–99.9)
PLATELET # BLD AUTO: 267 E9/L (ref 130–450)
PMV BLD AUTO: 10 FL (ref 7–12)
POTASSIUM SERPL-SCNC: 3.6 MMOL/L (ref 3.5–5)
RBC # BLD AUTO: 3.56 E12/L (ref 3.5–5.5)
SODIUM SERPL-SCNC: 136 MMOL/L (ref 132–146)
WBC # BLD: 13 E9/L (ref 4.5–11.5)

## 2023-06-08 PROCEDURE — 85027 COMPLETE CBC AUTOMATED: CPT

## 2023-06-08 PROCEDURE — 2700000000 HC OXYGEN THERAPY PER DAY

## 2023-06-08 PROCEDURE — 97110 THERAPEUTIC EXERCISES: CPT

## 2023-06-08 PROCEDURE — 6360000002 HC RX W HCPCS: Performed by: INTERNAL MEDICINE

## 2023-06-08 PROCEDURE — 2580000003 HC RX 258: Performed by: INTERNAL MEDICINE

## 2023-06-08 PROCEDURE — 97535 SELF CARE MNGMENT TRAINING: CPT

## 2023-06-08 PROCEDURE — 99232 SBSQ HOSP IP/OBS MODERATE 35: CPT | Performed by: INTERNAL MEDICINE

## 2023-06-08 PROCEDURE — 80048 BASIC METABOLIC PNL TOTAL CA: CPT

## 2023-06-08 PROCEDURE — 97165 OT EVAL LOW COMPLEX 30 MIN: CPT

## 2023-06-08 PROCEDURE — 36415 COLL VENOUS BLD VENIPUNCTURE: CPT

## 2023-06-08 PROCEDURE — 6370000000 HC RX 637 (ALT 250 FOR IP): Performed by: INTERNAL MEDICINE

## 2023-06-08 PROCEDURE — 1200000000 HC SEMI PRIVATE

## 2023-06-08 PROCEDURE — 97161 PT EVAL LOW COMPLEX 20 MIN: CPT

## 2023-06-08 PROCEDURE — 97530 THERAPEUTIC ACTIVITIES: CPT

## 2023-06-08 PROCEDURE — 94640 AIRWAY INHALATION TREATMENT: CPT

## 2023-06-08 PROCEDURE — 2500000003 HC RX 250 WO HCPCS: Performed by: INTERNAL MEDICINE

## 2023-06-08 RX ORDER — METHYLPREDNISOLONE SODIUM SUCCINATE 125 MG/2ML
20 INJECTION, POWDER, LYOPHILIZED, FOR SOLUTION INTRAMUSCULAR; INTRAVENOUS EVERY 12 HOURS
Status: DISCONTINUED | OUTPATIENT
Start: 2023-06-09 | End: 2023-06-08 | Stop reason: CLARIF

## 2023-06-08 RX ORDER — METHYLPREDNISOLONE SODIUM SUCCINATE 125 MG/2ML
20 INJECTION, POWDER, LYOPHILIZED, FOR SOLUTION INTRAMUSCULAR; INTRAVENOUS EVERY 12 HOURS
Status: DISCONTINUED | OUTPATIENT
Start: 2023-06-08 | End: 2023-06-08 | Stop reason: CLARIF

## 2023-06-08 RX ADMIN — ACETAMINOPHEN 650 MG: 325 TABLET ORAL at 18:46

## 2023-06-08 RX ADMIN — SODIUM CHLORIDE, PRESERVATIVE FREE 10 ML: 5 INJECTION INTRAVENOUS at 09:16

## 2023-06-08 RX ADMIN — METHYLPREDNISOLONE SODIUM SUCCINATE 20 MG: 125 INJECTION, POWDER, FOR SOLUTION INTRAMUSCULAR; INTRAVENOUS at 12:59

## 2023-06-08 RX ADMIN — ACETAMINOPHEN 650 MG: 325 TABLET ORAL at 09:15

## 2023-06-08 RX ADMIN — IPRATROPIUM BROMIDE AND ALBUTEROL SULFATE 1 DOSE: .5; 2.5 SOLUTION RESPIRATORY (INHALATION) at 11:15

## 2023-06-08 RX ADMIN — DOXYCYCLINE 100 MG: 100 INJECTION, POWDER, LYOPHILIZED, FOR SOLUTION INTRAVENOUS at 18:44

## 2023-06-08 RX ADMIN — BUPRENORPHINE HYDROCHLORIDE AND NALOXONE HYDROCHLORIDE DIHYDRATE 1 TABLET: 8; 2 TABLET SUBLINGUAL at 12:59

## 2023-06-08 RX ADMIN — CEFTRIAXONE 1000 MG: 1 INJECTION, POWDER, FOR SOLUTION INTRAMUSCULAR; INTRAVENOUS at 09:15

## 2023-06-08 RX ADMIN — IPRATROPIUM BROMIDE AND ALBUTEROL SULFATE 1 DOSE: .5; 2.5 SOLUTION RESPIRATORY (INHALATION) at 18:31

## 2023-06-08 RX ADMIN — BUPRENORPHINE HYDROCHLORIDE AND NALOXONE HYDROCHLORIDE DIHYDRATE 1 TABLET: 8; 2 TABLET SUBLINGUAL at 01:29

## 2023-06-08 RX ADMIN — BUPRENORPHINE HYDROCHLORIDE AND NALOXONE HYDROCHLORIDE DIHYDRATE 1 TABLET: 8; 2 TABLET SUBLINGUAL at 09:15

## 2023-06-08 RX ADMIN — IPRATROPIUM BROMIDE AND ALBUTEROL SULFATE 1 DOSE: .5; 2.5 SOLUTION RESPIRATORY (INHALATION) at 07:31

## 2023-06-08 RX ADMIN — ATORVASTATIN CALCIUM 40 MG: 40 TABLET, FILM COATED ORAL at 19:56

## 2023-06-08 RX ADMIN — ENOXAPARIN SODIUM 30 MG: 100 INJECTION SUBCUTANEOUS at 09:14

## 2023-06-08 RX ADMIN — BUPRENORPHINE HYDROCHLORIDE AND NALOXONE HYDROCHLORIDE DIHYDRATE 1 TABLET: 8; 2 TABLET SUBLINGUAL at 19:56

## 2023-06-08 RX ADMIN — ASPIRIN 81 MG: 81 TABLET, CHEWABLE ORAL at 09:14

## 2023-06-08 RX ADMIN — METHYLPREDNISOLONE SODIUM SUCCINATE 40 MG: 40 INJECTION, POWDER, FOR SOLUTION INTRAMUSCULAR; INTRAVENOUS at 01:29

## 2023-06-08 RX ADMIN — DOXYCYCLINE 100 MG: 100 INJECTION, POWDER, LYOPHILIZED, FOR SOLUTION INTRAVENOUS at 04:34

## 2023-06-08 RX ADMIN — SODIUM CHLORIDE, PRESERVATIVE FREE 10 ML: 5 INJECTION INTRAVENOUS at 19:56

## 2023-06-08 RX ADMIN — IPRATROPIUM BROMIDE AND ALBUTEROL SULFATE 1 DOSE: .5; 2.5 SOLUTION RESPIRATORY (INHALATION) at 14:32

## 2023-06-08 ASSESSMENT — PAIN SCALES - GENERAL
PAINLEVEL_OUTOF10: 6
PAINLEVEL_OUTOF10: 5

## 2023-06-08 ASSESSMENT — PAIN DESCRIPTION - LOCATION
LOCATION: HEAD
LOCATION: HEAD

## 2023-06-08 NOTE — PROCEDURES
Admitting Date and Time: 6/6/2023  3:46 PM  Admit Dx: Altered mental status [R41.82]  Polypharmacy [Z79.899]  Hypoxia [R09.02]  Altered mental status, unspecified altered mental status type [R41.82]  Pneumonia of both lungs due to infectious organism, unspecified part of lung [J18.9]    Subjective:    Pt feels greatly improved less sob  Per RN: No issue    ROS: denies fever, chills, cp, sob, n/v, HA unless stated above.      buprenorphine-naloxone  1 tablet SubLINGual TID    atorvastatin  40 mg Oral Nightly    aspirin  81 mg Oral Daily    methylPREDNISolone  40 mg IntraVENous Q12H    sodium chloride flush  10 mL IntraVENous 2 times per day    enoxaparin  30 mg SubCUTAneous Daily    ipratropium 0.5 mg-albuterol 2.5 mg  1 Dose Inhalation Q4H WA    cefTRIAXone (ROCEPHIN) IV  1,000 mg IntraVENous Q24H    doxycycline (VIBRAMYCIN) IV  100 mg IntraVENous Q12H     sodium chloride flush, 10 mL, PRN  sodium chloride, , PRN  promethazine, 12.5 mg, Q6H PRN   Or  ondansetron, 4 mg, Q6H PRN  polyethylene glycol, 17 g, Daily PRN  acetaminophen, 650 mg, Q6H PRN   Or  acetaminophen, 650 mg, Q6H PRN         Objective:    BP (!) 116/59   Pulse 59   Temp 98.2 °F (36.8 °C) (Oral)   Resp 16   Wt 100 lb (45.4 kg)   SpO2 95%   BMI 16.64 kg/m²   General Appearance: alert and oriented to person, place and time and in no acute distress  Skin: warm and dry  Head: normocephalic and atraumatic  Eyes: pupils equal, round, and reactive to light, extraocular eye movements intact, conjunctivae normal  Neck: neck supple and non tender without mass   Pulmonary/Chest: clear to auscultation bilaterally- no wheezes, rales or rhonchi, normal air movement, no respiratory distress  Cardiovascular: normal rate, normal S1 and S2 and no carotid bruits  Abdomen: soft, non-tender, non-distended, normal bowel sounds, no masses or organomegaly  Extremities: no cyanosis, no clubbing and trace edema  Neurologic: no cranial nerve deficit and speech
and speech normal      Recent Labs     06/06/23  1714      K 3.5   CL 97*   CO2 25   BUN 15   CREATININE 0.4*   GLUCOSE 121*   CALCIUM 10.6*       Recent Labs     06/06/23  1714   ALKPHOS 98   PROT 7.0   LABALBU 3.5   BILITOT 0.6   AST 13   ALT 12       Recent Labs     06/06/23  1714 06/07/23  0631   WBC 28.6* 16.9*   RBC 4.24 3.77   HGB 12.5 11.3*   HCT 39.9 35.4   MCV 94.1 93.9   MCH 29.5 30.0   MCHC 31.3* 31.9*   RDW 14.9 14.9    250   MPV 9.8 9.9           Radiology:   CT CHEST WO CONTRAST   Final Result   Chest: Scattered patchy opacifications in the right lung with coalescent and   dense opacities right lower lobe of bronchopneumonia likely with intervening   air bronchograms. Trace right pleural effusion superimposed upon chronic   changes. Follow-up to complete resolution to exclude underlying nodularity   given chronic obstructive pulmonary disease findings      Abdomen and pelvis: Abnormal distension of the gallbladder along with biliary   dilatation including intrahepatic and extrahepatic dilatation up to 12 mm at   the meche hepatis without obvious calculus disease somewhat indeterminate   although combined with main pancreatic ductal dilatation concerning for   ampullary obstructing process or pancreatic head lesion which is not clearly   evident on the current exam.  MRCP with and without contrast may be   considered for further evaluation along with correlation of LFTs. CT ABDOMEN PELVIS W IV CONTRAST Additional Contrast? None   Final Result   Chest: Scattered patchy opacifications in the right lung with coalescent and   dense opacities right lower lobe of bronchopneumonia likely with intervening   air bronchograms. Trace right pleural effusion superimposed upon chronic   changes.   Follow-up to complete resolution to exclude underlying nodularity   given chronic obstructive pulmonary disease findings      Abdomen and pelvis: Abnormal distension of the gallbladder along with

## 2023-06-08 NOTE — CARE COORDINATION
6/8/2023 1116 CM note: Pt resides with her granddtr Xuan Ramirez  and grandchildren in a 2 story house. Pt resides on 1st level. DME includes: Juan[employer] [place](portable and concentrator) from [facility] and now qualifies for Nell[employer] NC. [facility] DME informed po pox testing/new o2 orders. She is on suboxone and obtains med from [facility] every 2 weeks. She plans to return home with family at d/c.  Jenifer LIZ

## 2023-06-09 LAB
ANION GAP SERPL CALCULATED.3IONS-SCNC: 8 MMOL/L (ref 7–16)
BUN SERPL-MCNC: 17 MG/DL (ref 6–23)
CALCIUM SERPL-MCNC: 9.9 MG/DL (ref 8.6–10.2)
CHLORIDE SERPL-SCNC: 104 MMOL/L (ref 98–107)
CO2 SERPL-SCNC: 27 MMOL/L (ref 22–29)
CREAT SERPL-MCNC: 0.4 MG/DL (ref 0.5–1)
ERYTHROCYTE [DISTWIDTH] IN BLOOD BY AUTOMATED COUNT: 15 FL (ref 11.5–15)
GLUCOSE SERPL-MCNC: 138 MG/DL (ref 74–99)
HCT VFR BLD AUTO: 32.4 % (ref 34–48)
HGB BLD-MCNC: 10.5 G/DL (ref 11.5–15.5)
MCH RBC QN AUTO: 30.2 PG (ref 26–35)
MCHC RBC AUTO-ENTMCNC: 32.4 % (ref 32–34.5)
MCV RBC AUTO: 93.1 FL (ref 80–99.9)
PLATELET # BLD AUTO: 280 E9/L (ref 130–450)
PMV BLD AUTO: 10.1 FL (ref 7–12)
POTASSIUM SERPL-SCNC: 4.1 MMOL/L (ref 3.5–5)
RBC # BLD AUTO: 3.48 E12/L (ref 3.5–5.5)
SODIUM SERPL-SCNC: 139 MMOL/L (ref 132–146)
WBC # BLD: 15.5 E9/L (ref 4.5–11.5)

## 2023-06-09 PROCEDURE — 97530 THERAPEUTIC ACTIVITIES: CPT

## 2023-06-09 PROCEDURE — 2500000003 HC RX 250 WO HCPCS: Performed by: INTERNAL MEDICINE

## 2023-06-09 PROCEDURE — 97110 THERAPEUTIC EXERCISES: CPT

## 2023-06-09 PROCEDURE — 6360000002 HC RX W HCPCS: Performed by: INTERNAL MEDICINE

## 2023-06-09 PROCEDURE — 6370000000 HC RX 637 (ALT 250 FOR IP): Performed by: INTERNAL MEDICINE

## 2023-06-09 PROCEDURE — 99232 SBSQ HOSP IP/OBS MODERATE 35: CPT | Performed by: INTERNAL MEDICINE

## 2023-06-09 PROCEDURE — 36415 COLL VENOUS BLD VENIPUNCTURE: CPT

## 2023-06-09 PROCEDURE — 2580000003 HC RX 258: Performed by: INTERNAL MEDICINE

## 2023-06-09 PROCEDURE — 2700000000 HC OXYGEN THERAPY PER DAY

## 2023-06-09 PROCEDURE — 80048 BASIC METABOLIC PNL TOTAL CA: CPT

## 2023-06-09 PROCEDURE — 85027 COMPLETE CBC AUTOMATED: CPT

## 2023-06-09 PROCEDURE — 94640 AIRWAY INHALATION TREATMENT: CPT

## 2023-06-09 PROCEDURE — 1200000000 HC SEMI PRIVATE

## 2023-06-09 RX ORDER — GUAIFENESIN 600 MG/1
600 TABLET, EXTENDED RELEASE ORAL 2 TIMES DAILY
Status: DISCONTINUED | OUTPATIENT
Start: 2023-06-09 | End: 2023-06-10 | Stop reason: HOSPADM

## 2023-06-09 RX ORDER — MECOBALAMIN 5000 MCG
10 TABLET,DISINTEGRATING ORAL NIGHTLY PRN
Status: DISCONTINUED | OUTPATIENT
Start: 2023-06-09 | End: 2023-06-10 | Stop reason: HOSPADM

## 2023-06-09 RX ADMIN — GUAIFENESIN 600 MG: 600 TABLET, EXTENDED RELEASE ORAL at 20:51

## 2023-06-09 RX ADMIN — DOXYCYCLINE 100 MG: 100 INJECTION, POWDER, LYOPHILIZED, FOR SOLUTION INTRAVENOUS at 05:58

## 2023-06-09 RX ADMIN — SODIUM CHLORIDE, POTASSIUM CHLORIDE, SODIUM LACTATE AND CALCIUM CHLORIDE: 600; 310; 30; 20 INJECTION, SOLUTION INTRAVENOUS at 14:37

## 2023-06-09 RX ADMIN — ACETAMINOPHEN 650 MG: 325 TABLET ORAL at 21:40

## 2023-06-09 RX ADMIN — CEFTRIAXONE 1000 MG: 1 INJECTION, POWDER, FOR SOLUTION INTRAMUSCULAR; INTRAVENOUS at 07:53

## 2023-06-09 RX ADMIN — BUPRENORPHINE HYDROCHLORIDE AND NALOXONE HYDROCHLORIDE DIHYDRATE 1 TABLET: 8; 2 TABLET SUBLINGUAL at 20:51

## 2023-06-09 RX ADMIN — ATORVASTATIN CALCIUM 40 MG: 40 TABLET, FILM COATED ORAL at 20:51

## 2023-06-09 RX ADMIN — DOXYCYCLINE 100 MG: 100 INJECTION, POWDER, LYOPHILIZED, FOR SOLUTION INTRAVENOUS at 18:31

## 2023-06-09 RX ADMIN — IPRATROPIUM BROMIDE AND ALBUTEROL SULFATE 1 DOSE: .5; 2.5 SOLUTION RESPIRATORY (INHALATION) at 19:35

## 2023-06-09 RX ADMIN — ASPIRIN 81 MG: 81 TABLET, CHEWABLE ORAL at 07:53

## 2023-06-09 RX ADMIN — ENOXAPARIN SODIUM 30 MG: 100 INJECTION SUBCUTANEOUS at 07:53

## 2023-06-09 RX ADMIN — IPRATROPIUM BROMIDE AND ALBUTEROL SULFATE 1 DOSE: .5; 2.5 SOLUTION RESPIRATORY (INHALATION) at 09:25

## 2023-06-09 RX ADMIN — SODIUM CHLORIDE, POTASSIUM CHLORIDE, SODIUM LACTATE AND CALCIUM CHLORIDE: 600; 310; 30; 20 INJECTION, SOLUTION INTRAVENOUS at 00:37

## 2023-06-09 RX ADMIN — BUPRENORPHINE HYDROCHLORIDE AND NALOXONE HYDROCHLORIDE DIHYDRATE 1 TABLET: 8; 2 TABLET SUBLINGUAL at 07:48

## 2023-06-09 RX ADMIN — BUPRENORPHINE HYDROCHLORIDE AND NALOXONE HYDROCHLORIDE DIHYDRATE 1 TABLET: 8; 2 TABLET SUBLINGUAL at 14:33

## 2023-06-09 RX ADMIN — ACETAMINOPHEN 650 MG: 325 TABLET ORAL at 02:16

## 2023-06-09 RX ADMIN — ACETAMINOPHEN 650 MG: 325 TABLET ORAL at 07:58

## 2023-06-09 RX ADMIN — IPRATROPIUM BROMIDE AND ALBUTEROL SULFATE 1 DOSE: .5; 2.5 SOLUTION RESPIRATORY (INHALATION) at 12:52

## 2023-06-09 RX ADMIN — Medication 10 MG: at 20:53

## 2023-06-09 RX ADMIN — ACETAMINOPHEN 650 MG: 325 TABLET ORAL at 14:35

## 2023-06-09 ASSESSMENT — PAIN SCALES - GENERAL
PAINLEVEL_OUTOF10: 8
PAINLEVEL_OUTOF10: 8
PAINLEVEL_OUTOF10: 4
PAINLEVEL_OUTOF10: 8
PAINLEVEL_OUTOF10: 8
PAINLEVEL_OUTOF10: 7

## 2023-06-09 ASSESSMENT — PAIN DESCRIPTION - LOCATION
LOCATION: HEAD

## 2023-06-09 ASSESSMENT — PAIN DESCRIPTION - DESCRIPTORS
DESCRIPTORS: ACHING
DESCRIPTORS: ACHING;DISCOMFORT;DULL

## 2023-06-09 ASSESSMENT — PAIN DESCRIPTION - ORIENTATION: ORIENTATION: RIGHT;LEFT;MID

## 2023-06-09 NOTE — PLAN OF CARE
Problem: Safety - Adult  Goal: Free from fall injury  6/9/2023 1933 by Roge Raman RN  Outcome: Progressing  6/9/2023 0836 by Mally Taylor RN  Outcome: Progressing     Problem: Skin/Tissue Integrity  Goal: Absence of new skin breakdown  Description: 1. Monitor for areas of redness and/or skin breakdown  2. Assess vascular access sites hourly  3. Every 4-6 hours minimum:  Change oxygen saturation probe site  4. Every 4-6 hours:  If on nasal continuous positive airway pressure, respiratory therapy assess nares and determine need for appliance change or resting period.   6/9/2023 1933 by Roge Raman RN  Outcome: Progressing  6/9/2023 0836 by Mally Taylor RN  Outcome: Progressing     Problem: Pain  Goal: Verbalizes/displays adequate comfort level or baseline comfort level  Outcome: Progressing     Problem: Nutrition Deficit:  Goal: Optimize nutritional status  Outcome: Progressing

## 2023-06-09 NOTE — CARE COORDINATION
6/9/2023 1038 CM note: Pt resides with her granddtr Dalia Cardona  and grandchildren,pt resides on 1st level. DME includes: [email or web address] NC(portable and concentrator) from [facility] and now qualifies for [email or web address]. [facility] to deliver 10L concentrator and new portable tank to pt's home today. She plans to return home with family . PT recommendations for HHC discussed and pt declines.  Jenifer LIZ

## 2023-06-09 NOTE — PROGRESS NOTES
Patient's update given to granddaughter.
Pharmacist Review and Automatic Dose Adjustment of Prophylactic Enoxaparin         The reviewing pharmacist has made an adjustment to the ordered enoxaparin dose or converted to UFH per the approved [facility] protocol and table as identified below. Hank Trevino is a 81 y.o. female. Recent Labs     06/06/23  1714   CREATININE 0.4*       Estimated Creatinine Clearance: 94 mL/min (A) (based on SCr of 0.4 mg/dL (L)).     Recent Labs     06/06/23  1714   HGB 12.5   HCT 39.9        Recent Labs     06/06/23  1714   INR 1.1       Height:   Ht Readings from Last 1 Encounters:   05/25/23 5' 5\" (1.651 m)     Weight:  Wt Readings from Last 1 Encounters:   06/06/23 100 lb (45.4 kg)               Plan: Based upon the patient's weight and renal function    Ordered: Enoxaparin 40mg SUBQ Daily    Changed/converted to    New Order: Enoxaparin 30mg SUBQ Daily      Thank you,  Audra Morrison, [facility]  6/6/2023, 9:15 PM
Pulse ox was 92% on room air at rest.  Ambulated patient on room air. Oxygen saturation was 80% on room air while ambulating. Oxygen applied. Recovery pulse ox was 90% on 5 liters of oxygen while ambulating.
Adjustment For: No Adjustment                 BMI Categories: Underweight (BMI less than 22) age over 72    Estimated Daily Nutrient Needs:  Energy Requirements Based On: Formula  Weight Used for Energy Requirements: Current  Energy (kcal/day): 3548-5502 (29-33 kcal/kg CBW)  Weight Used for Protein Requirements: Current  Protein (g/day): 65-80 (1.4-1.8 gm pro/kg CBW)  Method Used for Fluid Requirements: Other (Comment)  Fluid (ml/day): 1800    Nutrition Diagnosis:   Moderate malnutrition, In context of acute illness or injury related to catabolic illness (COPD; w/ pneumonia, confusion) as evidenced by poor intake prior to admission, Criteria as identified in malnutrition assessment    Nutrition Interventions:   Food and/or Nutrient Delivery: Continue Current Diet, Start Oral Nutrition Supplement (Limited ONS options d/t fluid restriction.  Begin Gelatein 20 once/d, Boost Pudding Once/d & continue to monitor.)  Nutrition Education/Counseling: No recommendation at this time  Coordination of Nutrition Care: Continue to monitor while inpatient       Goals:     Goals: PO intake 75% or greater, by next RD assessment       Nutrition Monitoring and Evaluation:   Behavioral-Environmental Outcomes: None Identified  Food/Nutrient Intake Outcomes: Food and Nutrient Intake, Supplement Intake  Physical Signs/Symptoms Outcomes: Biochemical Data, Chewing or Swallowing, GI Status, Fluid Status or Edema, Nutrition Focused Physical Findings, Skin, Weight    Discharge Planning:    Continue Oral Nutrition Supplement     MIRYAM [employer] Jhony, RD  Contact: [phone]
Chronic obstructive pulmonary disease ([facility] [address] 3/20/2022    Depression     History of cardiovascular stress test 1-    LEXISCAN STRESS    Hx of blood clots     Hyperlipemia     Hypertension     Pancreatitis      Past Surgical History:   Procedure Laterality Date    BACK SURGERY      CORONARY ANGIOPLASTY WITH STENT PLACEMENT      ECHO COMPL W DOP COLOR FLOW  1/10/2013         HAND SURGERY Right 10/12/2019    HAND INCISION AND DRAINAGE performed by Donnie Enrique DO at [address] (CERVIX STATUS UNKNOWN)      TEMPOROMANDIBULAR JOINT SURGERY         SUBJECTIVE:    Precautions: Bedrest with bathroom Privileges , falls, alarm, and O2 ,4L, per nursing increase O2 to 5L with activity     Social history: Patient lives with granddaughter, granddaughter's , and 6 kids (ages 22,17,13,6,2,1) in a two story home resides first  with 4 steps, without rail  to enter home. Walk in shower        Equipment owned: O2, (4L)    [address] - Inpatient   How much help is needed turning from your back to your side while in a flat bed without using bedrails?: A Little  How much help is needed moving from lying on your back to sitting on the side of a flat bed without using bedrails?: A Little  How much help is needed moving to and from a bed to a chair?: A Little  How much help is needed standing up from a chair using your arms?: A Little  How much help is needed walking in hospital room?: A Little  How much help is needed climbing 3-5 steps with a railing?: A Lot  AM-PAC Inpatient Mobility Raw Score : 17  AM-PAC Inpatient T-Scale Score : 42.13  Mobility Inpatient CMS 0-100% Score: 50.57  Mobility Inpatient CMS G-Code Modifier : CK    Nursing cleared patient for PT evaluation. The admitting diagnosis and active problem list as listed above have been reviewed prior to the initiation of this evaluation.       OBJECTIVE:   Initial Evaluation  Date: 6/8/2023 Treatment Date:     Short
Inpatient   How much help is needed for putting on and taking off regular lower body clothing?: A Little  How much help is needed for bathing (which includes washing, rinsing, drying)?: A Little  How much help is needed for toileting (which includes using toilet, bedpan, or urinal)?: A Little  How much help is needed for putting on and taking off regular upper body clothing?: A Little  How much help is needed for taking care of personal grooming?: A Little  How much help for eating meals?: None  AM-PAC Inpatient Daily Activity Raw Score: 19  AM-PAC Inpatient ADL T-Scale Score : 40.22  ADL Inpatient CMS 0-100% Score: 42.8  ADL Inpatient CMS G-Code Modifier : CK     Functional Assessment:    Initial Eval Status  Date: 6/8/23 Treatment Status  Date: STGs = LTGs  Time frame: 10-14 days   Feeding Independent   Independent    Grooming Supervision   Independent    UB Dressing Minimal Assist   Independent    LB Dressing Minimal Assist   Independent    Bathing Minimal Assist  Modified [place]    Toileting Supervision for hygiene and to stand at sink level to wash and dry hands. Independent    Bed Mobility  Supine to sit: Supervision   Sit to supine: N/T as pt was up in chair   Rolling: Supervision     Supine to sit: Independent   Sit to supine: Independent   Rolling:Independent     Functional Transfers Supervision from EOB   Supervision for transfer to and from low commode with minimal verbal cues to use grab bar for safe commode transfer. Transfer training with verbal cues for hand placement throughout session to improve safety.    Independent    Functional Mobility Supervision with no device to and from bathroom and up to bedside chair  Modified [place]    Balance Sitting:     Static: good     Dynamic: fair   Standing: fair  with no device  Sitting:     Static: good     Dynamic: good   Standing: good  with no device    Activity Tolerance Fair   good    Visual/  Perceptual Glasses: yes but they are broke
obvious calculus disease somewhat indeterminate   although combined with main pancreatic ductal dilatation concerning for   ampullary obstructing process or pancreatic head lesion which is not clearly   evident on the current exam.  MRCP with and without contrast may be   considered for further evaluation along with correlation of LFTs. CT HEAD WO CONTRAST   Final Result   1. No acute intracranial abnormality. 2.  Microvascular white matter ischemic changes again seen, when compared to   the previous study performed 02/14/2022. 3.  Pansinusitis. XR CHEST PORTABLE   Final Result   Right lung airspace opacity and small pleural effusion. Assessment:  Principal Problem:    Altered mental status  Resolved Problems:    * No resolved hospital problems. *        Plan: History of present illness from History and Physical:  This is a 81 y.o. female  has a past medical history of Anxiety, Back pain, chronic, CAD (coronary artery disease), Chronic obstructive pulmonary disease ([address] Depression, History of cardiovascular stress test, Hx of blood clots, Hyperlipemia, Hypertension, and Pancreatitis. presented with Confusion, hypoxia, SOB, cough  for last few days prior to arrival to the hospital. SOB is associated with some cough, nonproductive and subjective fever and chills. Initially SOB was mild, intermittent but gradually getting more persistent. Started gradually. Exacerbated by general activity or exertion. Relieved only partially by rest. Associated with intermittent confusion, as per family but in ED she is alert and oriented x4. The patient was seen and examined at bedside, appears alert and awake with no acute distress and is able to answer simple  questions.  On direct questioning, patient denied any  resting ongoing chest pain, hemoptysis, productive cough, fever, ongoing palpitation, active abdominal pain, hematemesis, rectal bleeding, nalini, hematuria, any other 
Inpatient CMS G-Code Modifier : CK    Nursing cleared patient for PT treatment. OBJECTIVE:   Initial Evaluation  Date: 6/8/2023 Treatment Date:  6/9/2023       Short Term/ Long Term   Goals   Was pt agreeable to Eval/treatment? Yes yes To be met in 4 days   Pain level   0/10   0/10    Bed Mobility  Using rails and head of bed elevated:     Rolling: Supervision     Supine to sit: Supervision     Sit to supine: Not assessed patient in chair    Scooting: Not assessed patient in chair   Using rails and head of bed elevated:     Rolling: Supervision    Supine to sit: Supervision    Sit to supine: Supervision    Scooting: Supervision     Rolling: Independent    Supine to sit:  Independent    Sit to supine: Independent    Scooting: Independent     Transfers Sit to stand: Supervision   Sit to stand: Supervision     Sit to stand: Independent    Ambulation     30 feet using  no device with Supervision    for balance and safety 2 x 100 feet using  no device with Supervision    cues for safety, pacing, and pursed lip breathing     100 feet using  no device with Independent    Stair negotiation: ascended and descended   Not assessed     4 steps, without rail, Supervision       ROM Within functional limits    Increase range of motion 10% of affected joints    Strength BUE:  refer to OT eval  RLE:  4-/5  LLE:  4-/5  Increase strength in affected mm groups by 1/3 grade   Balance Sitting EOB:  good -  Dynamic Standing:  fair + Sitting EOB: good   Dynamic Standing: good using no AD   Sitting EOB:  good   Dynamic Standing: good      Patient is Alert & Oriented x person, place, time, and situation and follows directions    Sensation:  Patient  denies numbness/tingling   Edema:  no   Endurance: fair      Vitals:  6 liters nasal cannula   Blood Pressure at rest  Blood Pressure during session    Heart Rate at rest  Heart Rate during session    SPO2 at rest 96%  SPO2 during session 81-94%     Patient education  Patient educated on
nalini, hematuria, any other  and GI complaints, and any new focal neuro deficits. Pneumonia causing hypoxia with metabolic encephalopathy possibly combined with UTI coverage with ceftriaxone and doxycycline. blood cultures still negative. Collected at 06/06/23 17:01. Presented with high White count & tachycardic. She has too much activity intolerance on 6/9 to go home. Hopefully she will improve enough tomorrow to be discharged. known COPD 6/7 add sm 40q12 for possible component of copd exacerbation. Dec to 20 bid in pm of 6/8 Has been on 4 liter O2 since admission & at home. Narcotic dependency she is on Suboxone 8/2 3 times daily  Chronic pain with mood disorder continue outpatient regimen  Coronary artery disease continue previous regimen  No change to outpatient treatment regimen for the existing stable combordities including: htn. hld  Full code. DVT prophylaxis: Lovenox 30 qd d/w pharmacits for px due to underweight. She was not on anticoagulants prior to this admission.   Her clot had been remote
in hospital , purse lip breathing, safety , and seated exercises      Patient response to education:   Pt verbalized understanding Pt demonstrated skill Pt requires further education in this area   Yes Partial Yes      Treatment:  Patient practiced and was instructed/facilitated in the following treatment: Patient stood from edge of bed to amb in hallway. Patient took standing rest break and returned to room. Education on seated exercises and transferred back to supine position in bed. Therapeutic Exercises:  not performed     At end of session, patient in bed with  .  call light and phone within reach,  all lines and tubes intact, nursing notified. Patient would benefit from continued skilled Physical Therapy to improve functional independence and quality of life.          Patient's/ family goals   home    Time in  133  Time out 147    Total Treatment Time  14 minutes      CPT codes:  Therapeutic activities (48046)   14 minutes  1 unit(s)    Ada Kaur, [facility]  #[id number]

## 2023-06-10 VITALS
WEIGHT: 100 LBS | SYSTOLIC BLOOD PRESSURE: 124 MMHG | RESPIRATION RATE: 18 BRPM | OXYGEN SATURATION: 94 % | BODY MASS INDEX: 16.66 KG/M2 | TEMPERATURE: 97.8 F | HEIGHT: 65 IN | DIASTOLIC BLOOD PRESSURE: 55 MMHG | HEART RATE: 64 BPM

## 2023-06-10 PROBLEM — J15.9 COMMUNITY ACQUIRED BACTERIAL PNEUMONIA: Status: ACTIVE | Noted: 2023-06-10

## 2023-06-10 LAB
ANION GAP SERPL CALCULATED.3IONS-SCNC: 8 MMOL/L (ref 7–16)
BUN SERPL-MCNC: 16 MG/DL (ref 6–23)
CALCIUM SERPL-MCNC: 9.7 MG/DL (ref 8.6–10.2)
CHLORIDE SERPL-SCNC: 104 MMOL/L (ref 98–107)
CO2 SERPL-SCNC: 27 MMOL/L (ref 22–29)
CREAT SERPL-MCNC: 0.4 MG/DL (ref 0.5–1)
ERYTHROCYTE [DISTWIDTH] IN BLOOD BY AUTOMATED COUNT: 15.1 FL (ref 11.5–15)
GLUCOSE SERPL-MCNC: 129 MG/DL (ref 74–99)
HCT VFR BLD AUTO: 34.7 % (ref 34–48)
HGB BLD-MCNC: 11 G/DL (ref 11.5–15.5)
MCH RBC QN AUTO: 30 PG (ref 26–35)
MCHC RBC AUTO-ENTMCNC: 31.7 % (ref 32–34.5)
MCV RBC AUTO: 94.6 FL (ref 80–99.9)
PLATELET # BLD AUTO: 294 E9/L (ref 130–450)
PMV BLD AUTO: 9.9 FL (ref 7–12)
POTASSIUM SERPL-SCNC: 4.5 MMOL/L (ref 3.5–5)
RBC # BLD AUTO: 3.67 E12/L (ref 3.5–5.5)
SODIUM SERPL-SCNC: 139 MMOL/L (ref 132–146)
WBC # BLD: 11.4 E9/L (ref 4.5–11.5)

## 2023-06-10 PROCEDURE — 2580000003 HC RX 258: Performed by: INTERNAL MEDICINE

## 2023-06-10 PROCEDURE — 6360000002 HC RX W HCPCS: Performed by: INTERNAL MEDICINE

## 2023-06-10 PROCEDURE — 80048 BASIC METABOLIC PNL TOTAL CA: CPT

## 2023-06-10 PROCEDURE — 6370000000 HC RX 637 (ALT 250 FOR IP): Performed by: INTERNAL MEDICINE

## 2023-06-10 PROCEDURE — 36415 COLL VENOUS BLD VENIPUNCTURE: CPT

## 2023-06-10 PROCEDURE — 2500000003 HC RX 250 WO HCPCS: Performed by: INTERNAL MEDICINE

## 2023-06-10 PROCEDURE — 2700000000 HC OXYGEN THERAPY PER DAY

## 2023-06-10 PROCEDURE — 85027 COMPLETE CBC AUTOMATED: CPT

## 2023-06-10 PROCEDURE — 94640 AIRWAY INHALATION TREATMENT: CPT

## 2023-06-10 PROCEDURE — 99239 HOSP IP/OBS DSCHRG MGMT >30: CPT | Performed by: INTERNAL MEDICINE

## 2023-06-10 RX ORDER — PREDNISONE 50 MG/1
50 TABLET ORAL DAILY
Qty: 1 TABLET | Refills: 0 | Status: SHIPPED | OUTPATIENT
Start: 2023-06-10 | End: 2023-06-11

## 2023-06-10 RX ORDER — AMOXICILLIN AND CLAVULANATE POTASSIUM 875; 125 MG/1; MG/1
1 TABLET, FILM COATED ORAL 2 TIMES DAILY
Qty: 10 TABLET | Refills: 0 | Status: SHIPPED | OUTPATIENT
Start: 2023-06-10 | End: 2023-06-15

## 2023-06-10 RX ORDER — DOXYCYCLINE HYCLATE 100 MG
100 TABLET ORAL 2 TIMES DAILY
Qty: 10 TABLET | Refills: 0 | Status: SHIPPED | OUTPATIENT
Start: 2023-06-10 | End: 2023-06-15

## 2023-06-10 RX ADMIN — BUPRENORPHINE HYDROCHLORIDE AND NALOXONE HYDROCHLORIDE DIHYDRATE 1 TABLET: 8; 2 TABLET SUBLINGUAL at 13:55

## 2023-06-10 RX ADMIN — ENOXAPARIN SODIUM 30 MG: 100 INJECTION SUBCUTANEOUS at 08:56

## 2023-06-10 RX ADMIN — SODIUM CHLORIDE, POTASSIUM CHLORIDE, SODIUM LACTATE AND CALCIUM CHLORIDE: 600; 310; 30; 20 INJECTION, SOLUTION INTRAVENOUS at 00:18

## 2023-06-10 RX ADMIN — IPRATROPIUM BROMIDE AND ALBUTEROL SULFATE 1 DOSE: .5; 2.5 SOLUTION RESPIRATORY (INHALATION) at 06:04

## 2023-06-10 RX ADMIN — IPRATROPIUM BROMIDE AND ALBUTEROL SULFATE 1 DOSE: .5; 2.5 SOLUTION RESPIRATORY (INHALATION) at 18:04

## 2023-06-10 RX ADMIN — ACETAMINOPHEN 650 MG: 325 TABLET ORAL at 13:58

## 2023-06-10 RX ADMIN — CEFTRIAXONE 1000 MG: 1 INJECTION, POWDER, FOR SOLUTION INTRAMUSCULAR; INTRAVENOUS at 08:56

## 2023-06-10 RX ADMIN — ACETAMINOPHEN 650 MG: 325 TABLET ORAL at 03:40

## 2023-06-10 RX ADMIN — BUPRENORPHINE HYDROCHLORIDE AND NALOXONE HYDROCHLORIDE DIHYDRATE 1 TABLET: 8; 2 TABLET SUBLINGUAL at 08:55

## 2023-06-10 RX ADMIN — IPRATROPIUM BROMIDE AND ALBUTEROL SULFATE 1 DOSE: .5; 2.5 SOLUTION RESPIRATORY (INHALATION) at 09:48

## 2023-06-10 RX ADMIN — ASPIRIN 81 MG: 81 TABLET, CHEWABLE ORAL at 08:56

## 2023-06-10 RX ADMIN — DOXYCYCLINE 100 MG: 100 INJECTION, POWDER, LYOPHILIZED, FOR SOLUTION INTRAVENOUS at 05:25

## 2023-06-10 RX ADMIN — GUAIFENESIN 600 MG: 600 TABLET, EXTENDED RELEASE ORAL at 08:55

## 2023-06-10 RX ADMIN — SODIUM CHLORIDE, POTASSIUM CHLORIDE, SODIUM LACTATE AND CALCIUM CHLORIDE: 600; 310; 30; 20 INJECTION, SOLUTION INTRAVENOUS at 14:05

## 2023-06-10 RX ADMIN — SODIUM CHLORIDE, PRESERVATIVE FREE 10 ML: 5 INJECTION INTRAVENOUS at 08:57

## 2023-06-10 ASSESSMENT — PAIN SCALES - GENERAL
PAINLEVEL_OUTOF10: 7
PAINLEVEL_OUTOF10: 8
PAINLEVEL_OUTOF10: 7

## 2023-06-10 ASSESSMENT — PAIN DESCRIPTION - DESCRIPTORS
DESCRIPTORS: ACHING

## 2023-06-10 ASSESSMENT — PAIN DESCRIPTION - LOCATION
LOCATION: HEAD

## 2023-06-10 ASSESSMENT — PAIN DESCRIPTION - ORIENTATION
ORIENTATION: MID
ORIENTATION: RIGHT;MID

## 2023-06-10 NOTE — DISCHARGE INSTRUCTIONS
Do not decrease your oxygen below your baseline of 4 L. In fact when you are active and walking please increase it to 6 or more liters before you get out of breath. Follow up with  you primary care physician during the next 7 days after discharge    Your information:  Name: Willow Hunt  : 1950    Your instructions:    [address] [address]. PLEASE MAKE AND KEEP YOUR FOLLOW UP APPOINTMENTS. IF YOU EXPERIENCE ANY OF THE FOLLOWING SYMPTOMS, CHEST PAIN, SHORTNESS OF BREATH, COUGHING UP BLOOD OR BLOODY SPUTUM, STOMACH PAIN OR CRAMPING, DARK, TARRY STOOLS, LOSS OF APPETITE, GENERAL NOT FEELING WELL, SIGNS AND SYMPTOMS OF INFECTION LIKE FEVER AND OR CHILLS, PLEASE CALL PCP OR RETURN TO THE EMERGENCY ROOM. What to do after you leave the hospital:    Recommended diet: regular diet    Recommended activity: activity as tolerated        The following personal items were collected during your admission and were returned to you:    Belongings  Dental Appliances: None  Vision - Corrective Lenses: Eyeglasses  Hearing Aid: None  Clothing: Shirt, Undergarments  Jewelry: Bracelet, Necklace, Ring  Electronic Devices: None  Weapons (Notify Protective Services/Security): None  Home Medications: None  Valuables Given To: Patient  Provide Name(s) of Who Valuable(s) Were Given To: Tessy Tee    Information obtained by:  By signing below, I understand that if any problems occur once I leave the hospital I am to contact PCP. I understand and acknowledge receipt of the instructions indicated above.

## 2023-06-10 NOTE — DISCHARGE SUMMARY
[facility] Physician Discharge Summary       No follow-up provider specified. Activity level: Slowly increase as tolerated    Diet: ADULT DIET; Regular; Low Fat/Low Chol/High Fiber/2 gm Na; 1800 ml  ADULT ORAL NUTRITION SUPPLEMENT; Lunch; Other Oral Supplement; Gelatein 20  ADULT ORAL NUTRITION SUPPLEMENT; Dinner; Fortified Pudding Oral Supplement    Labs: None are pending at the discharge    Condition at discharge: Stable    Dispo: Return to home setting     Patient ID:  Homero Cranker  [id number]  81 y.o.  1950    Admit date: 6/6/2023    Discharge date and time:  6/10/2023  4:07 PM    Admission Diagnoses: Principal Problem:    Altered mental status  Active Problems: Moderate protein-calorie malnutrition ([address]  Resolved Problems:    * No resolved hospital problems. *      Discharge Diagnoses: Principal Problem:    Altered mental status  Active Problems: Moderate protein-calorie malnutrition ([address]  Resolved Problems:    * No resolved hospital problems. *      Consults:  IP CONSULT TO INTERNAL MEDICINE    Procedures: None significant except if described in hospital course. Hospital Course:   History of present illness from History and Physical:  This is a 81 y.o. female  has a past medical history of Anxiety, Back pain, chronic, CAD (coronary artery disease), Chronic obstructive pulmonary disease ([address] Depression, History of cardiovascular stress test, Hx of blood clots, Hyperlipemia, Hypertension, and Pancreatitis. presented with Confusion, hypoxia, SOB, cough  for last few days prior to arrival to the hospital. SOB is associated with some cough, nonproductive and subjective fever and chills. Initially SOB was mild, intermittent but gradually getting more persistent. Started gradually. Exacerbated by general activity or exertion. Relieved only partially by rest. Associated with intermittent confusion, as per family but in ED she is alert and oriented x4.

## 2023-06-11 LAB
BACTERIA BLD CULT ORG #2: NORMAL
BACTERIA BLD CULT: NORMAL

## 2023-06-19 ENCOUNTER — TELEPHONE (OUTPATIENT)
Dept: FAMILY MEDICINE CLINIC | Age: 73
End: 2023-06-19

## 2023-08-14 ENCOUNTER — TELEPHONE (OUTPATIENT)
Dept: FAMILY MEDICINE CLINIC | Age: 73
End: 2023-08-14

## 2023-08-14 NOTE — TELEPHONE ENCOUNTER
.Left message for patient call back to setup mammogram or too advise if done elsewhere.  Patient also due for appt

## 2023-10-20 ENCOUNTER — APPOINTMENT (OUTPATIENT)
Dept: GENERAL RADIOLOGY | Age: 73
End: 2023-10-20
Payer: COMMERCIAL

## 2023-10-20 ENCOUNTER — HOSPITAL ENCOUNTER (INPATIENT)
Age: 73
LOS: 7 days | Discharge: HOME HEALTH | End: 2023-10-27
Attending: EMERGENCY MEDICINE | Admitting: FAMILY MEDICINE
Payer: COMMERCIAL

## 2023-10-20 DIAGNOSIS — J44.1 COPD EXACERBATION (HCC): Primary | ICD-10-CM

## 2023-10-20 PROBLEM — G47.00 INSOMNIA: Status: RESOLVED | Noted: 2022-03-20 | Resolved: 2023-10-20

## 2023-10-20 PROBLEM — E43 SEVERE PROTEIN-CALORIE MALNUTRITION (HCC): Status: ACTIVE | Noted: 2023-06-08

## 2023-10-20 PROBLEM — U07.1 ACUTE HYPOXEMIC RESPIRATORY FAILURE DUE TO COVID-19 (HCC): Status: RESOLVED | Noted: 2022-02-02 | Resolved: 2023-10-20

## 2023-10-20 PROBLEM — R76.8 RED BLOOD CELL ANTIBODY POSITIVE: Chronic | Status: RESOLVED | Noted: 2019-10-15 | Resolved: 2023-10-20

## 2023-10-20 PROBLEM — J30.9 ALLERGIC RHINITIS: Status: RESOLVED | Noted: 2022-03-20 | Resolved: 2023-10-20

## 2023-10-20 PROBLEM — M65.9 TENOSYNOVITIS: Status: RESOLVED | Noted: 2019-10-11 | Resolved: 2023-10-20

## 2023-10-20 PROBLEM — J15.9 COMMUNITY ACQUIRED BACTERIAL PNEUMONIA: Status: RESOLVED | Noted: 2023-06-10 | Resolved: 2023-10-20

## 2023-10-20 PROBLEM — J96.01 ACUTE HYPOXEMIC RESPIRATORY FAILURE DUE TO COVID-19 (HCC): Status: RESOLVED | Noted: 2022-02-02 | Resolved: 2023-10-20

## 2023-10-20 PROBLEM — R41.82 ALTERED MENTAL STATUS: Status: RESOLVED | Noted: 2023-06-06 | Resolved: 2023-10-20

## 2023-10-20 PROBLEM — M65.90 TENOSYNOVITIS: Status: RESOLVED | Noted: 2019-10-11 | Resolved: 2023-10-20

## 2023-10-20 LAB
ALBUMIN SERPL-MCNC: 4 G/DL (ref 3.5–5.2)
ALLEN TEST: POSITIVE
ALP SERPL-CCNC: 95 U/L (ref 35–104)
ALT SERPL-CCNC: 9 U/L (ref 0–32)
ANION GAP SERPL CALCULATED.3IONS-SCNC: 8 MMOL/L (ref 7–16)
AST SERPL-CCNC: 14 U/L (ref 0–31)
BASOPHILS # BLD: 0.02 K/UL (ref 0–0.2)
BASOPHILS NFR BLD: 0 % (ref 0–2)
BILIRUB SERPL-MCNC: 0.2 MG/DL (ref 0–1.2)
BUN SERPL-MCNC: 12 MG/DL (ref 6–23)
CALCIUM SERPL-MCNC: 10 MG/DL (ref 8.6–10.2)
CHLORIDE SERPL-SCNC: 103 MMOL/L (ref 98–107)
CO2 SERPL-SCNC: 30 MMOL/L (ref 22–29)
CREAT SERPL-MCNC: 0.5 MG/DL (ref 0.5–1)
EOSINOPHIL # BLD: 0 K/UL (ref 0.05–0.5)
EOSINOPHILS RELATIVE PERCENT: 0 % (ref 0–6)
ERYTHROCYTE [DISTWIDTH] IN BLOOD BY AUTOMATED COUNT: 13.3 % (ref 11.5–15)
GFR SERPL CREATININE-BSD FRML MDRD: >60 ML/MIN/1.73M2
GLUCOSE SERPL-MCNC: 119 MG/DL (ref 74–99)
HCT VFR BLD AUTO: 39.3 % (ref 34–48)
HGB BLD-MCNC: 12.1 G/DL (ref 11.5–15.5)
IMM GRANULOCYTES # BLD AUTO: 0.07 K/UL (ref 0–0.58)
IMM GRANULOCYTES NFR BLD: 0 % (ref 0–5)
LACTATE BLDV-SCNC: 0.7 MMOL/L (ref 0.5–2.2)
LIPASE SERPL-CCNC: 13 U/L (ref 13–60)
LYMPHOCYTES NFR BLD: 1.13 K/UL (ref 1.5–4)
LYMPHOCYTES RELATIVE PERCENT: 7 % (ref 20–42)
MCH RBC QN AUTO: 29.4 PG (ref 26–35)
MCHC RBC AUTO-ENTMCNC: 30.8 G/DL (ref 32–34.5)
MCV RBC AUTO: 95.4 FL (ref 80–99.9)
MONOCYTES NFR BLD: 0.4 K/UL (ref 0.1–0.95)
MONOCYTES NFR BLD: 3 % (ref 2–12)
NEUTROPHILS NFR BLD: 90 % (ref 43–80)
NEUTS SEG NFR BLD: 14.43 K/UL (ref 1.8–7.3)
O2 DELIVERY DEVICE: ABNORMAL
PATIENT TEMP: 37
PLATELET # BLD AUTO: 335 K/UL (ref 130–450)
PMV BLD AUTO: 9.8 FL (ref 7–12)
POC HCO3: 28.3 MMOL/L (ref 22–26)
POC O2 SATURATION: 91.1 % (ref 92–98.5)
POC PCO2 TEMP: 53 MM HG
POC PCO2: 53 MM HG (ref 35–45)
POC PH TEMP: 7.33
POC PH: 7.33 (ref 7.35–7.45)
POC PO2 TEMP: 66.6 MM HG
POC PO2: 66.6 MM HG (ref 60–80)
POSITIVE BASE EXCESS, ART: 1.3 MMOL/L (ref 0–3)
POTASSIUM SERPL-SCNC: 3.6 MMOL/L (ref 3.5–5)
PROT SERPL-MCNC: 7.2 G/DL (ref 6.4–8.3)
RBC # BLD AUTO: 4.12 M/UL (ref 3.5–5.5)
SAMPLE SITE: ABNORMAL
SARS-COV-2 RDRP RESP QL NAA+PROBE: NOT DETECTED
SODIUM SERPL-SCNC: 141 MMOL/L (ref 132–146)
SPECIMEN DESCRIPTION: NORMAL
TROPONIN I SERPL HS-MCNC: 16 NG/L (ref 0–9)
TROPONIN I SERPL HS-MCNC: 23 NG/L (ref 0–9)
WBC OTHER # BLD: 16.1 K/UL (ref 4.5–11.5)

## 2023-10-20 PROCEDURE — 87635 SARS-COV-2 COVID-19 AMP PRB: CPT

## 2023-10-20 PROCEDURE — 36600 WITHDRAWAL OF ARTERIAL BLOOD: CPT

## 2023-10-20 PROCEDURE — 71045 X-RAY EXAM CHEST 1 VIEW: CPT

## 2023-10-20 PROCEDURE — 80053 COMPREHEN METABOLIC PANEL: CPT

## 2023-10-20 PROCEDURE — 6360000002 HC RX W HCPCS

## 2023-10-20 PROCEDURE — 94660 CPAP INITIATION&MGMT: CPT

## 2023-10-20 PROCEDURE — 96374 THER/PROPH/DIAG INJ IV PUSH: CPT

## 2023-10-20 PROCEDURE — 94640 AIRWAY INHALATION TREATMENT: CPT

## 2023-10-20 PROCEDURE — 6370000000 HC RX 637 (ALT 250 FOR IP)

## 2023-10-20 PROCEDURE — 85025 COMPLETE CBC W/AUTO DIFF WBC: CPT

## 2023-10-20 PROCEDURE — 84484 ASSAY OF TROPONIN QUANT: CPT

## 2023-10-20 PROCEDURE — 82803 BLOOD GASES ANY COMBINATION: CPT

## 2023-10-20 PROCEDURE — 6360000002 HC RX W HCPCS: Performed by: EMERGENCY MEDICINE

## 2023-10-20 PROCEDURE — 93005 ELECTROCARDIOGRAM TRACING: CPT

## 2023-10-20 PROCEDURE — 36415 COLL VENOUS BLD VENIPUNCTURE: CPT

## 2023-10-20 PROCEDURE — 83690 ASSAY OF LIPASE: CPT

## 2023-10-20 PROCEDURE — 1200000000 HC SEMI PRIVATE

## 2023-10-20 PROCEDURE — 99285 EMERGENCY DEPT VISIT HI MDM: CPT

## 2023-10-20 PROCEDURE — 5A09457 ASSISTANCE WITH RESPIRATORY VENTILATION, 24-96 CONSECUTIVE HOURS, CONTINUOUS POSITIVE AIRWAY PRESSURE: ICD-10-PCS | Performed by: INTERNAL MEDICINE

## 2023-10-20 PROCEDURE — 99223 1ST HOSP IP/OBS HIGH 75: CPT | Performed by: FAMILY MEDICINE

## 2023-10-20 PROCEDURE — 83605 ASSAY OF LACTIC ACID: CPT

## 2023-10-20 PROCEDURE — 6370000000 HC RX 637 (ALT 250 FOR IP): Performed by: EMERGENCY MEDICINE

## 2023-10-20 RX ORDER — ENOXAPARIN SODIUM 100 MG/ML
40 INJECTION SUBCUTANEOUS DAILY
Status: DISCONTINUED | OUTPATIENT
Start: 2023-10-21 | End: 2023-10-23 | Stop reason: DRUGHIGH

## 2023-10-20 RX ORDER — ACETAMINOPHEN 325 MG/1
650 TABLET ORAL EVERY 6 HOURS PRN
Status: DISCONTINUED | OUTPATIENT
Start: 2023-10-20 | End: 2023-10-26

## 2023-10-20 RX ORDER — ARFORMOTEROL TARTRATE 15 UG/2ML
15 SOLUTION RESPIRATORY (INHALATION)
Status: DISCONTINUED | OUTPATIENT
Start: 2023-10-20 | End: 2023-10-27 | Stop reason: HOSPADM

## 2023-10-20 RX ORDER — SODIUM CHLORIDE 9 MG/ML
INJECTION, SOLUTION INTRAVENOUS PRN
Status: DISCONTINUED | OUTPATIENT
Start: 2023-10-20 | End: 2023-10-27 | Stop reason: HOSPADM

## 2023-10-20 RX ORDER — ASPIRIN 81 MG/1
81 TABLET, CHEWABLE ORAL DAILY
Status: DISCONTINUED | OUTPATIENT
Start: 2023-10-21 | End: 2023-10-27 | Stop reason: HOSPADM

## 2023-10-20 RX ORDER — ONDANSETRON 2 MG/ML
4 INJECTION INTRAMUSCULAR; INTRAVENOUS ONCE
Status: COMPLETED | OUTPATIENT
Start: 2023-10-20 | End: 2023-10-20

## 2023-10-20 RX ORDER — BUDESONIDE 0.5 MG/2ML
0.5 INHALANT ORAL
Status: DISCONTINUED | OUTPATIENT
Start: 2023-10-20 | End: 2023-10-27 | Stop reason: HOSPADM

## 2023-10-20 RX ORDER — GUAIFENESIN 600 MG/1
600 TABLET, EXTENDED RELEASE ORAL 2 TIMES DAILY
Status: DISCONTINUED | OUTPATIENT
Start: 2023-10-20 | End: 2023-10-27 | Stop reason: HOSPADM

## 2023-10-20 RX ORDER — PREDNISONE 20 MG/1
40 TABLET ORAL DAILY
Status: DISCONTINUED | OUTPATIENT
Start: 2023-10-23 | End: 2023-10-22

## 2023-10-20 RX ORDER — IPRATROPIUM BROMIDE AND ALBUTEROL SULFATE 2.5; .5 MG/3ML; MG/3ML
1 SOLUTION RESPIRATORY (INHALATION)
Status: DISCONTINUED | OUTPATIENT
Start: 2023-10-21 | End: 2023-10-23

## 2023-10-20 RX ORDER — IPRATROPIUM BROMIDE AND ALBUTEROL SULFATE 2.5; .5 MG/3ML; MG/3ML
1 SOLUTION RESPIRATORY (INHALATION) EVERY 4 HOURS PRN
Status: DISCONTINUED | OUTPATIENT
Start: 2023-10-20 | End: 2023-10-25

## 2023-10-20 RX ORDER — ACETAMINOPHEN 650 MG/1
650 SUPPOSITORY RECTAL EVERY 6 HOURS PRN
Status: DISCONTINUED | OUTPATIENT
Start: 2023-10-20 | End: 2023-10-26

## 2023-10-20 RX ORDER — IPRATROPIUM BROMIDE AND ALBUTEROL SULFATE 2.5; .5 MG/3ML; MG/3ML
3 SOLUTION RESPIRATORY (INHALATION) ONCE
Status: COMPLETED | OUTPATIENT
Start: 2023-10-20 | End: 2023-10-20

## 2023-10-20 RX ORDER — ONDANSETRON 4 MG/1
4 TABLET, ORALLY DISINTEGRATING ORAL EVERY 8 HOURS PRN
Status: DISCONTINUED | OUTPATIENT
Start: 2023-10-20 | End: 2023-10-26

## 2023-10-20 RX ORDER — SODIUM CHLORIDE 0.9 % (FLUSH) 0.9 %
5-40 SYRINGE (ML) INJECTION PRN
Status: DISCONTINUED | OUTPATIENT
Start: 2023-10-20 | End: 2023-10-27 | Stop reason: HOSPADM

## 2023-10-20 RX ORDER — ATORVASTATIN CALCIUM 40 MG/1
40 TABLET, FILM COATED ORAL NIGHTLY
Status: DISCONTINUED | OUTPATIENT
Start: 2023-10-20 | End: 2023-10-27 | Stop reason: HOSPADM

## 2023-10-20 RX ORDER — DULOXETIN HYDROCHLORIDE 20 MG/1
20 CAPSULE, DELAYED RELEASE ORAL DAILY
Status: DISCONTINUED | OUTPATIENT
Start: 2023-10-21 | End: 2023-10-27 | Stop reason: HOSPADM

## 2023-10-20 RX ORDER — BENZONATATE 100 MG/1
100 CAPSULE ORAL 3 TIMES DAILY PRN
Status: DISCONTINUED | OUTPATIENT
Start: 2023-10-20 | End: 2023-10-27 | Stop reason: HOSPADM

## 2023-10-20 RX ORDER — AZITHROMYCIN 250 MG/1
500 TABLET, FILM COATED ORAL DAILY
Status: COMPLETED | OUTPATIENT
Start: 2023-10-21 | End: 2023-10-23

## 2023-10-20 RX ORDER — BUPRENORPHINE HYDROCHLORIDE AND NALOXONE HYDROCHLORIDE DIHYDRATE 8; 2 MG/1; MG/1
1 TABLET SUBLINGUAL 3 TIMES DAILY
Status: DISCONTINUED | OUTPATIENT
Start: 2023-10-20 | End: 2023-10-27 | Stop reason: HOSPADM

## 2023-10-20 RX ORDER — ACETAMINOPHEN 500 MG
1000 TABLET ORAL ONCE
Status: COMPLETED | OUTPATIENT
Start: 2023-10-20 | End: 2023-10-20

## 2023-10-20 RX ORDER — SODIUM CHLORIDE 0.9 % (FLUSH) 0.9 %
5-40 SYRINGE (ML) INJECTION EVERY 12 HOURS SCHEDULED
Status: DISCONTINUED | OUTPATIENT
Start: 2023-10-20 | End: 2023-10-27 | Stop reason: HOSPADM

## 2023-10-20 RX ORDER — ONDANSETRON 2 MG/ML
4 INJECTION INTRAMUSCULAR; INTRAVENOUS EVERY 6 HOURS PRN
Status: DISCONTINUED | OUTPATIENT
Start: 2023-10-20 | End: 2023-10-26

## 2023-10-20 RX ADMIN — IPRATROPIUM BROMIDE AND ALBUTEROL SULFATE 3 DOSE: 2.5; .5 SOLUTION RESPIRATORY (INHALATION) at 16:01

## 2023-10-20 RX ADMIN — ONDANSETRON 4 MG: 2 INJECTION INTRAMUSCULAR; INTRAVENOUS at 22:05

## 2023-10-20 RX ADMIN — METHYLPREDNISOLONE SODIUM SUCCINATE 125 MG: 125 INJECTION, POWDER, FOR SOLUTION INTRAMUSCULAR; INTRAVENOUS at 16:38

## 2023-10-20 RX ADMIN — ACETAMINOPHEN 1000 MG: 500 TABLET ORAL at 22:04

## 2023-10-20 ASSESSMENT — PATIENT HEALTH QUESTIONNAIRE - PHQ9
SUM OF ALL RESPONSES TO PHQ QUESTIONS 1-9: 0
2. FEELING DOWN, DEPRESSED OR HOPELESS: 0
SUM OF ALL RESPONSES TO PHQ QUESTIONS 1-9: 0
SUM OF ALL RESPONSES TO PHQ QUESTIONS 1-9: 0
SUM OF ALL RESPONSES TO PHQ9 QUESTIONS 1 & 2: 0
1. LITTLE INTEREST OR PLEASURE IN DOING THINGS: 0
SUM OF ALL RESPONSES TO PHQ QUESTIONS 1-9: 0

## 2023-10-20 ASSESSMENT — PAIN DESCRIPTION - LOCATION: LOCATION: HEAD;LEG

## 2023-10-20 ASSESSMENT — PAIN DESCRIPTION - ORIENTATION: ORIENTATION: OTHER (COMMENT)

## 2023-10-20 NOTE — ED PROVIDER NOTES
[address]        Pt Name: Nehemias Galvez  MRN: [id number]  [address] [facility] 1950  Date of evaluation: 10/20/2023  Provider: Flor Chairez DO  PCP: Heidi Wagner DO  Note Started: 3:39 PM EDT 10/20/23    CHIEF COMPLAINT       Chief Complaint   Patient presents with    Emesis     X1 day. Reports that it started this morning. Patient reporting increased weakness. HISTORY OF PRESENT ILLNESS: 1 or more Elements            Nehemias Galvez is a 72 y.o. female with history of COPD, anxiety, chronic back pain, CAD, hypertension, hyperlipidemia, who presents for shortness of breath that started this morning. Patient also complaining of nausea and vomiting for the past 2 days. Patient reports that she has been coughing and congested for the past several days as well. Patient denies any chest pain, dizziness, diaphoresis, fevers, chills, diarrhea, constipation. Nursing Notes were all reviewed and agreed with or any disagreements were addressed in the HPI. REVIEW OF EXTERNAL NOTE :       Records reviewed medical history, surgical history, medications. Chart Review/External Note Review    Last Echo reviewed by Me:  No results found for: \"LVEF\", \"LVEFMODE\"          Controlled Substance Monitoring:    Acute and Chronic Pain Monitoring:        No data to display                    REVIEW OF SYSTEMS :      Positives and Pertinent negatives as per HPI.      SURGICAL HISTORY     Past Surgical History:   Procedure Laterality Date    BACK SURGERY      CORONARY ANGIOPLASTY WITH STENT PLACEMENT      ECHO COMPL W DOP COLOR FLOW  1/10/2013         HAND SURGERY Right 10/12/2019    HAND INCISION AND DRAINAGE performed by Anais Youssef DO at [address] (CERVIX STATUS UNKNOWN)      [facility]       Previous Medications    ALBUTEROL SULFATE HFA Normocephalic and atraumatic  Eyes: sclera non icteric,   ENT:  Oropharynx clear, handling secretions, no trismus, no asymmetry of the posterior oropharynx or uvular edema  Neck: Supple, no JVD  Respiratory: Diminished breath sounds bilaterally, minimal wheezes appreciated bilaterally. Cardiovascular:  Regular rate. Regular rhythm. Distal pulses intact. No murmurs appreciated. Chest: No chest wall tenderness  GI:  Abdomen Soft, Non tender, Non distended. No rebound, guarding, or rigidity. Musculoskeletal: Moves all extremities x 4. No Swelling. Integument: skin warm and dry. No rashes. Neurologic: GCS 15, no unilateral weakness.    Psychiatric: Normal Affect            DIAGNOSTIC RESULTS   LABS: Interpreted by Kaila Recinos II, DO    Labs Reviewed   CBC WITH AUTO DIFFERENTIAL - Abnormal; Notable for the following components:       Result Value    WBC 16.1 (*)     MCHC 30.8 (*)     Neutrophils % 90 (*)     Lymphocytes % 7 (*)     Neutrophils Absolute 14.43 (*)     Lymphocytes Absolute 1.13 (*)     Eosinophils Absolute 0.00 (*)     All other components within normal limits   COMPREHENSIVE METABOLIC PANEL W/ REFLEX TO MG FOR LOW K - Abnormal; Notable for the following components:    CO2 30 (*)     Glucose 119 (*)     All other components within normal limits   TROPONIN - Abnormal; Notable for the following components:    Troponin, High Sensitivity 23 (*)     All other components within normal limits   TROPONIN - Abnormal; Notable for the following components:    Troponin, High Sensitivity 16 (*)     All other components within normal limits   POCT BLOOD GASES - Abnormal; Notable for the following components:    POC pH 7.334 (*)     POC pCO2 53.0 (*)     POC HCO3 28.3 (*)     POC O2 SAT 91.1 (*)     All other components within normal limits   POCT GLUCOSE - Abnormal; Notable for the following components:    POC Glucose 146 (*)     All other components within normal limits   COVID-19, RAPID   LIPASE   LACTIC

## 2023-10-21 LAB
ANION GAP SERPL CALCULATED.3IONS-SCNC: 10 MMOL/L (ref 7–16)
B PARAP IS1001 DNA NPH QL NAA+NON-PROBE: NOT DETECTED
B PERT DNA SPEC QL NAA+PROBE: NOT DETECTED
BASOPHILS # BLD: 0 K/UL (ref 0–0.2)
BASOPHILS NFR BLD: 0 % (ref 0–2)
BILIRUB UR QL STRIP: ABNORMAL
BUN SERPL-MCNC: 25 MG/DL (ref 6–23)
C PNEUM DNA NPH QL NAA+NON-PROBE: NOT DETECTED
CALCIUM SERPL-MCNC: 9.9 MG/DL (ref 8.6–10.2)
CHLORIDE SERPL-SCNC: 104 MMOL/L (ref 98–107)
CLARITY UR: CLEAR
CO2 SERPL-SCNC: 27 MMOL/L (ref 22–29)
COLOR UR: YELLOW
CREAT SERPL-MCNC: 0.4 MG/DL (ref 0.5–1)
EKG ATRIAL RATE: 69 BPM
EKG P AXIS: 70 DEGREES
EKG P-R INTERVAL: 158 MS
EKG Q-T INTERVAL: 442 MS
EKG QRS DURATION: 90 MS
EKG QTC CALCULATION (BAZETT): 473 MS
EKG R AXIS: 70 DEGREES
EKG T AXIS: 62 DEGREES
EKG VENTRICULAR RATE: 69 BPM
EOSINOPHIL # BLD: 0 K/UL (ref 0.05–0.5)
EOSINOPHILS RELATIVE PERCENT: 0 % (ref 0–6)
ERYTHROCYTE [DISTWIDTH] IN BLOOD BY AUTOMATED COUNT: 13.5 % (ref 11.5–15)
FLUAV RNA NPH QL NAA+NON-PROBE: NOT DETECTED
FLUBV RNA NPH QL NAA+NON-PROBE: NOT DETECTED
GFR SERPL CREATININE-BSD FRML MDRD: >60 ML/MIN/1.73M2
GLUCOSE BLD-MCNC: 146 MG/DL (ref 74–99)
GLUCOSE SERPL-MCNC: 130 MG/DL (ref 74–99)
GLUCOSE UR STRIP-MCNC: NEGATIVE MG/DL
HADV DNA NPH QL NAA+NON-PROBE: NOT DETECTED
HCOV 229E RNA NPH QL NAA+NON-PROBE: NOT DETECTED
HCOV HKU1 RNA NPH QL NAA+NON-PROBE: NOT DETECTED
HCOV NL63 RNA NPH QL NAA+NON-PROBE: NOT DETECTED
HCOV OC43 RNA NPH QL NAA+NON-PROBE: NOT DETECTED
HCT VFR BLD AUTO: 36.7 % (ref 34–48)
HGB BLD-MCNC: 11.2 G/DL (ref 11.5–15.5)
HGB UR QL STRIP.AUTO: ABNORMAL
HMPV RNA NPH QL NAA+NON-PROBE: NOT DETECTED
HPIV1 RNA NPH QL NAA+NON-PROBE: NOT DETECTED
HPIV2 RNA NPH QL NAA+NON-PROBE: NOT DETECTED
HPIV3 RNA NPH QL NAA+NON-PROBE: NOT DETECTED
HPIV4 RNA NPH QL NAA+NON-PROBE: NOT DETECTED
IMM GRANULOCYTES # BLD AUTO: 0.07 K/UL (ref 0–0.58)
IMM GRANULOCYTES NFR BLD: 1 % (ref 0–5)
KETONES UR STRIP-MCNC: 15 MG/DL
LEUKOCYTE ESTERASE UR QL STRIP: NEGATIVE
LYMPHOCYTES NFR BLD: 1.07 K/UL (ref 1.5–4)
LYMPHOCYTES RELATIVE PERCENT: 7 % (ref 20–42)
M PNEUMO DNA NPH QL NAA+NON-PROBE: NOT DETECTED
MAGNESIUM SERPL-MCNC: 2.3 MG/DL (ref 1.6–2.6)
MCH RBC QN AUTO: 28.6 PG (ref 26–35)
MCHC RBC AUTO-ENTMCNC: 30.5 G/DL (ref 32–34.5)
MCV RBC AUTO: 93.9 FL (ref 80–99.9)
MONOCYTES NFR BLD: 0.21 K/UL (ref 0.1–0.95)
MONOCYTES NFR BLD: 2 % (ref 2–12)
MUCOUS THREADS URNS QL MICRO: PRESENT
NEUTROPHILS NFR BLD: 91 % (ref 43–80)
NEUTS SEG NFR BLD: 13.08 K/UL (ref 1.8–7.3)
NITRITE UR QL STRIP: NEGATIVE
PH UR STRIP: 6.5 [PH] (ref 5–9)
PLATELET # BLD AUTO: 324 K/UL (ref 130–450)
PMV BLD AUTO: 9.6 FL (ref 7–12)
POTASSIUM SERPL-SCNC: 3.5 MMOL/L (ref 3.5–5)
PROT UR STRIP-MCNC: 30 MG/DL
RBC # BLD AUTO: 3.91 M/UL (ref 3.5–5.5)
RBC #/AREA URNS HPF: ABNORMAL /HPF
RSV RNA NPH QL NAA+NON-PROBE: NOT DETECTED
RV+EV RNA NPH QL NAA+NON-PROBE: DETECTED
SARS-COV-2 RNA NPH QL NAA+NON-PROBE: NOT DETECTED
SODIUM SERPL-SCNC: 141 MMOL/L (ref 132–146)
SP GR UR STRIP: >1.03 (ref 1–1.03)
SPECIMEN DESCRIPTION: ABNORMAL
UROBILINOGEN UR STRIP-ACNC: 1 EU/DL (ref 0–1)
WBC #/AREA URNS HPF: ABNORMAL /HPF
WBC OTHER # BLD: 14.4 K/UL (ref 4.5–11.5)

## 2023-10-21 PROCEDURE — 99233 SBSQ HOSP IP/OBS HIGH 50: CPT | Performed by: INTERNAL MEDICINE

## 2023-10-21 PROCEDURE — 6370000000 HC RX 637 (ALT 250 FOR IP): Performed by: FAMILY MEDICINE

## 2023-10-21 PROCEDURE — 6360000002 HC RX W HCPCS: Performed by: FAMILY MEDICINE

## 2023-10-21 PROCEDURE — 36415 COLL VENOUS BLD VENIPUNCTURE: CPT

## 2023-10-21 PROCEDURE — 2580000003 HC RX 258: Performed by: FAMILY MEDICINE

## 2023-10-21 PROCEDURE — 80307 DRUG TEST PRSMV CHEM ANLYZR: CPT

## 2023-10-21 PROCEDURE — 81001 URINALYSIS AUTO W/SCOPE: CPT

## 2023-10-21 PROCEDURE — 82962 GLUCOSE BLOOD TEST: CPT

## 2023-10-21 PROCEDURE — 85025 COMPLETE CBC W/AUTO DIFF WBC: CPT

## 2023-10-21 PROCEDURE — 80048 BASIC METABOLIC PNL TOTAL CA: CPT

## 2023-10-21 PROCEDURE — 83735 ASSAY OF MAGNESIUM: CPT

## 2023-10-21 PROCEDURE — 2700000000 HC OXYGEN THERAPY PER DAY

## 2023-10-21 PROCEDURE — 0202U NFCT DS 22 TRGT SARS-COV-2: CPT

## 2023-10-21 PROCEDURE — 94660 CPAP INITIATION&MGMT: CPT

## 2023-10-21 PROCEDURE — 1200000000 HC SEMI PRIVATE

## 2023-10-21 PROCEDURE — 93010 ELECTROCARDIOGRAM REPORT: CPT | Performed by: INTERNAL MEDICINE

## 2023-10-21 PROCEDURE — 94640 AIRWAY INHALATION TREATMENT: CPT

## 2023-10-21 RX ADMIN — METHYLPREDNISOLONE SODIUM SUCCINATE 40 MG: 40 INJECTION, POWDER, FOR SOLUTION INTRAMUSCULAR; INTRAVENOUS at 11:43

## 2023-10-21 RX ADMIN — IPRATROPIUM BROMIDE AND ALBUTEROL SULFATE 1 DOSE: .5; 2.5 SOLUTION RESPIRATORY (INHALATION) at 09:56

## 2023-10-21 RX ADMIN — Medication 10 ML: at 00:12

## 2023-10-21 RX ADMIN — AZITHROMYCIN DIHYDRATE 500 MG: 250 TABLET, FILM COATED ORAL at 08:27

## 2023-10-21 RX ADMIN — Medication 10 ML: at 21:44

## 2023-10-21 RX ADMIN — Medication 10 ML: at 08:28

## 2023-10-21 RX ADMIN — BUPRENORPHINE HYDROCHLORIDE AND NALOXONE HYDROCHLORIDE DIHYDRATE 1 TABLET: 8; 2 TABLET SUBLINGUAL at 00:17

## 2023-10-21 RX ADMIN — GUAIFENESIN 600 MG: 600 TABLET, EXTENDED RELEASE ORAL at 21:43

## 2023-10-21 RX ADMIN — METHYLPREDNISOLONE SODIUM SUCCINATE 40 MG: 40 INJECTION, POWDER, FOR SOLUTION INTRAMUSCULAR; INTRAVENOUS at 06:05

## 2023-10-21 RX ADMIN — BUDESONIDE INHALATION 500 MCG: 0.5 SUSPENSION RESPIRATORY (INHALATION) at 18:29

## 2023-10-21 RX ADMIN — GUAIFENESIN 600 MG: 600 TABLET, EXTENDED RELEASE ORAL at 08:27

## 2023-10-21 RX ADMIN — ACETAMINOPHEN 650 MG: 325 TABLET ORAL at 13:58

## 2023-10-21 RX ADMIN — GUAIFENESIN 600 MG: 600 TABLET, EXTENDED RELEASE ORAL at 00:12

## 2023-10-21 RX ADMIN — BUPRENORPHINE HYDROCHLORIDE AND NALOXONE HYDROCHLORIDE DIHYDRATE 1 TABLET: 8; 2 TABLET SUBLINGUAL at 21:43

## 2023-10-21 RX ADMIN — ARFORMOTEROL TARTRATE 15 MCG: 15 SOLUTION RESPIRATORY (INHALATION) at 18:29

## 2023-10-21 RX ADMIN — IPRATROPIUM BROMIDE AND ALBUTEROL SULFATE 1 DOSE: .5; 2.5 SOLUTION RESPIRATORY (INHALATION) at 14:56

## 2023-10-21 RX ADMIN — BUPRENORPHINE HYDROCHLORIDE AND NALOXONE HYDROCHLORIDE DIHYDRATE 1 TABLET: 8; 2 TABLET SUBLINGUAL at 08:27

## 2023-10-21 RX ADMIN — ATORVASTATIN CALCIUM 40 MG: 40 TABLET, FILM COATED ORAL at 00:17

## 2023-10-21 RX ADMIN — WATER 1000 MG: 1 INJECTION INTRAMUSCULAR; INTRAVENOUS; SUBCUTANEOUS at 21:41

## 2023-10-21 RX ADMIN — ENOXAPARIN SODIUM 40 MG: 100 INJECTION SUBCUTANEOUS at 08:27

## 2023-10-21 RX ADMIN — ATORVASTATIN CALCIUM 40 MG: 40 TABLET, FILM COATED ORAL at 21:43

## 2023-10-21 RX ADMIN — WATER 1000 MG: 1 INJECTION INTRAMUSCULAR; INTRAVENOUS; SUBCUTANEOUS at 00:11

## 2023-10-21 RX ADMIN — DULOXETINE HYDROCHLORIDE 20 MG: 20 CAPSULE, DELAYED RELEASE ORAL at 08:27

## 2023-10-21 RX ADMIN — METHYLPREDNISOLONE SODIUM SUCCINATE 40 MG: 40 INJECTION, POWDER, FOR SOLUTION INTRAMUSCULAR; INTRAVENOUS at 00:11

## 2023-10-21 RX ADMIN — BUDESONIDE INHALATION 500 MCG: 0.5 SUSPENSION RESPIRATORY (INHALATION) at 06:14

## 2023-10-21 RX ADMIN — IPRATROPIUM BROMIDE AND ALBUTEROL SULFATE 1 DOSE: .5; 2.5 SOLUTION RESPIRATORY (INHALATION) at 18:29

## 2023-10-21 RX ADMIN — ARFORMOTEROL TARTRATE 15 MCG: 15 SOLUTION RESPIRATORY (INHALATION) at 06:14

## 2023-10-21 RX ADMIN — METHYLPREDNISOLONE SODIUM SUCCINATE 40 MG: 40 INJECTION, POWDER, FOR SOLUTION INTRAMUSCULAR; INTRAVENOUS at 18:32

## 2023-10-21 RX ADMIN — ASPIRIN 81 MG CHEWABLE TABLET 81 MG: 81 TABLET CHEWABLE at 08:27

## 2023-10-21 RX ADMIN — IPRATROPIUM BROMIDE AND ALBUTEROL SULFATE 1 DOSE: .5; 2.5 SOLUTION RESPIRATORY (INHALATION) at 06:14

## 2023-10-21 RX ADMIN — BUPRENORPHINE HYDROCHLORIDE AND NALOXONE HYDROCHLORIDE DIHYDRATE 1 TABLET: 8; 2 TABLET SUBLINGUAL at 13:57

## 2023-10-21 ASSESSMENT — ENCOUNTER SYMPTOMS
ABDOMINAL DISTENTION: 0
ABDOMINAL PAIN: 0
CHEST TIGHTNESS: 0
COUGH: 1
BLOOD IN STOOL: 0
WHEEZING: 0
DIARRHEA: 0
SINUS PAIN: 0
CONSTIPATION: 0
SHORTNESS OF BREATH: 0
FACIAL SWELLING: 0
RHINORRHEA: 0

## 2023-10-21 ASSESSMENT — LIFESTYLE VARIABLES
HOW OFTEN DO YOU HAVE A DRINK CONTAINING ALCOHOL: NEVER
HOW MANY STANDARD DRINKS CONTAINING ALCOHOL DO YOU HAVE ON A TYPICAL DAY: PATIENT DOES NOT DRINK

## 2023-10-21 ASSESSMENT — PAIN SCALES - GENERAL: PAINLEVEL_OUTOF10: 8

## 2023-10-21 NOTE — PLAN OF CARE
Problem: Safety - Adult  Goal: Free from fall injury  Outcome: Progressing     Problem: Respiratory - Adult  Goal: Achieves optimal ventilation and oxygenation  Outcome: Progressing     Problem: Gastrointestinal - Adult  Goal: Minimal or absence of nausea and vomiting  Outcome: Progressing

## 2023-10-21 NOTE — PROGRESS NOTES
4 Eyes Skin Assessment     NAME:  Talib Dias OF BIRTH:  1950  MEDICAL RECORD NUMBER:  [id number]    The patient is being assessed for  Admission    I agree that at least one RN has performed a thorough Head to Toe Skin Assessment on the patient. ALL assessment sites listed below have been assessed. Areas assessed by both nurses:    Head, Face, Ears, Shoulders, Back, Chest, Arms, Elbows, Hands, Sacrum. Buttock, Coccyx, Ischium, Legs. Feet and Heels, and Under Medical Devices         Does the Patient have a Wound?  No noted wound(s)       Bebeto Prevention initiated by RN: No  Wound Care Orders initiated by RN: No    Pressure Injury (Stage 3,4, Unstageable, DTI, NWPT, and Complex wounds) if present, place Wound referral order by RN under : No    New Ostomies, if present place, Ostomy referral order under : No     Nurse 1 eSignature: Electronically signed by Ubaldo Arango RN on 10/21/23 at 2:55 AM EDT    **SHARE this note so that the co-signing nurse can place an eSignature**    Nurse 2 eSignature: Electronically signed by Dedrick Rivera RN on 10/21/23 at 3:04 AM EDT

## 2023-10-21 NOTE — H&P
[facility]   History and Physical      CHIEF COMPLAINT:  nausesa/vomiting, SOB    History of Present Illness:  72 y.o. female with a history of COPD, chronic hypoxic respiratory failure on 4L, HTN, HLD presents with SOB and URI symptoms for two days. Also has had nausea/vomiting for the past 2 days. Denies abdominal pain. No fever/chills. Workup in ED significant for CO2 30, glucose 119, troponin 23, WBC 16.1. ABG revealed pH 7.334, pCO2 28.3, pO2 91.1.  Flu/COVID negative. CXR no acute process. Given tylenol, duoneb and zofran in ED. Informant(s) for H&P: patient, chart    REVIEW OF SYSTEMS:  no fevers, chills, cp, ha, vision/hearing changes, wt changes, hot/cold flashes, other open skin lesions, diarrhea, constipation, dysuria/hematuria unless noted in HPI. Complete ROS performed with the patient and is otherwise negative. PMH:  Past Medical History:   Diagnosis Date    Anxiety     Back pain, chronic     CAD (coronary artery disease)     Chronic obstructive pulmonary disease ([address]) 3/20/2022    Community acquired bacterial pneumonia 6/10/2023    Depression     History of cardiovascular stress test 1-    LEXISCAN STRESS    Hx of blood clots     Hyperlipemia     Hypertension     Pancreatitis        Surgical History:  Past Surgical History:   Procedure Laterality Date    BACK SURGERY      CORONARY ANGIOPLASTY WITH STENT PLACEMENT      ECHO COMPL W DOP COLOR FLOW  1/10/2013         HAND SURGERY Right 10/12/2019    HAND INCISION AND DRAINAGE performed by Mary Manriquez DO at [address] (CERVIX STATUS UNKNOWN)      TEMPOROMANDIBULAR JOINT SURGERY         Medications Prior to Admission:    Prior to Admission medications    Medication Sig Start Date End Date Taking?  Authorizing Provider   albuterol sulfate HFA (PROVENTIL;VENTOLIN;PROAIR) 108 (90 Base) MCG/ACT inhaler inhale 2 puffs by mouth and INTO THE LUNGS every 4 to 6 hours if needed 4/27/23

## 2023-10-22 ENCOUNTER — APPOINTMENT (OUTPATIENT)
Dept: CT IMAGING | Age: 73
End: 2023-10-22
Payer: COMMERCIAL

## 2023-10-22 LAB
ALBUMIN SERPL-MCNC: 3.5 G/DL (ref 3.5–5.2)
ALP SERPL-CCNC: 80 U/L (ref 35–104)
ALT SERPL-CCNC: 8 U/L (ref 0–32)
AMPHET UR QL SCN: NEGATIVE
ANION GAP SERPL CALCULATED.3IONS-SCNC: 10 MMOL/L (ref 7–16)
AST SERPL-CCNC: 9 U/L (ref 0–31)
BARBITURATES UR QL SCN: NEGATIVE
BASOPHILS # BLD: 0.02 K/UL (ref 0–0.2)
BASOPHILS NFR BLD: 0 % (ref 0–2)
BENZODIAZ UR QL: NEGATIVE
BILIRUB SERPL-MCNC: 0.2 MG/DL (ref 0–1.2)
BUN SERPL-MCNC: 33 MG/DL (ref 6–23)
BUPRENORPHINE UR QL: POSITIVE
CALCIUM SERPL-MCNC: 10 MG/DL (ref 8.6–10.2)
CANNABINOIDS UR QL SCN: NEGATIVE
CHLORIDE SERPL-SCNC: 103 MMOL/L (ref 98–107)
CO2 SERPL-SCNC: 29 MMOL/L (ref 22–29)
COCAINE UR QL SCN: NEGATIVE
CREAT SERPL-MCNC: 0.4 MG/DL (ref 0.5–1)
EOSINOPHIL # BLD: 0 K/UL (ref 0.05–0.5)
EOSINOPHILS RELATIVE PERCENT: 0 % (ref 0–6)
ERYTHROCYTE [DISTWIDTH] IN BLOOD BY AUTOMATED COUNT: 13.3 % (ref 11.5–15)
FENTANYL UR QL: NEGATIVE
GFR SERPL CREATININE-BSD FRML MDRD: >60 ML/MIN/1.73M2
GLUCOSE SERPL-MCNC: 126 MG/DL (ref 74–99)
HCT VFR BLD AUTO: 36 % (ref 34–48)
HGB BLD-MCNC: 11.4 G/DL (ref 11.5–15.5)
IMM GRANULOCYTES # BLD AUTO: 0.08 K/UL (ref 0–0.58)
IMM GRANULOCYTES NFR BLD: 1 % (ref 0–5)
LYMPHOCYTES NFR BLD: 0.98 K/UL (ref 1.5–4)
LYMPHOCYTES RELATIVE PERCENT: 7 % (ref 20–42)
MCH RBC QN AUTO: 29.5 PG (ref 26–35)
MCHC RBC AUTO-ENTMCNC: 31.7 G/DL (ref 32–34.5)
MCV RBC AUTO: 93 FL (ref 80–99.9)
METHADONE UR QL: NEGATIVE
MONOCYTES NFR BLD: 0.4 K/UL (ref 0.1–0.95)
MONOCYTES NFR BLD: 3 % (ref 2–12)
NEUTROPHILS NFR BLD: 90 % (ref 43–80)
NEUTS SEG NFR BLD: 12.71 K/UL (ref 1.8–7.3)
OPIATES UR QL SCN: NEGATIVE
OXYCODONE UR QL SCN: NEGATIVE
PCP UR QL SCN: NEGATIVE
PLATELET # BLD AUTO: 391 K/UL (ref 130–450)
PMV BLD AUTO: 9.7 FL (ref 7–12)
POTASSIUM SERPL-SCNC: 3.9 MMOL/L (ref 3.5–5)
PROT SERPL-MCNC: 6.9 G/DL (ref 6.4–8.3)
RBC # BLD AUTO: 3.87 M/UL (ref 3.5–5.5)
SODIUM SERPL-SCNC: 142 MMOL/L (ref 132–146)
TEST INFORMATION: ABNORMAL
WBC OTHER # BLD: 14.2 K/UL (ref 4.5–11.5)

## 2023-10-22 PROCEDURE — 87899 AGENT NOS ASSAY W/OPTIC: CPT

## 2023-10-22 PROCEDURE — 1200000000 HC SEMI PRIVATE

## 2023-10-22 PROCEDURE — 6360000002 HC RX W HCPCS: Performed by: INTERNAL MEDICINE

## 2023-10-22 PROCEDURE — 6370000000 HC RX 637 (ALT 250 FOR IP): Performed by: FAMILY MEDICINE

## 2023-10-22 PROCEDURE — 6360000002 HC RX W HCPCS: Performed by: FAMILY MEDICINE

## 2023-10-22 PROCEDURE — 85025 COMPLETE CBC W/AUTO DIFF WBC: CPT

## 2023-10-22 PROCEDURE — 71250 CT THORAX DX C-: CPT

## 2023-10-22 PROCEDURE — 80053 COMPREHEN METABOLIC PANEL: CPT

## 2023-10-22 PROCEDURE — 2580000003 HC RX 258: Performed by: INTERNAL MEDICINE

## 2023-10-22 PROCEDURE — 2700000000 HC OXYGEN THERAPY PER DAY

## 2023-10-22 PROCEDURE — 2580000003 HC RX 258: Performed by: FAMILY MEDICINE

## 2023-10-22 PROCEDURE — 94660 CPAP INITIATION&MGMT: CPT

## 2023-10-22 PROCEDURE — 87449 NOS EACH ORGANISM AG IA: CPT

## 2023-10-22 PROCEDURE — 6370000000 HC RX 637 (ALT 250 FOR IP): Performed by: INTERNAL MEDICINE

## 2023-10-22 PROCEDURE — 94640 AIRWAY INHALATION TREATMENT: CPT

## 2023-10-22 PROCEDURE — 36415 COLL VENOUS BLD VENIPUNCTURE: CPT

## 2023-10-22 PROCEDURE — 99233 SBSQ HOSP IP/OBS HIGH 50: CPT | Performed by: INTERNAL MEDICINE

## 2023-10-22 RX ORDER — TRAZODONE HYDROCHLORIDE 50 MG/1
50 TABLET ORAL NIGHTLY
Status: DISCONTINUED | OUTPATIENT
Start: 2023-10-22 | End: 2023-10-27 | Stop reason: HOSPADM

## 2023-10-22 RX ORDER — POLYETHYLENE GLYCOL 3350 17 G/17G
17 POWDER, FOR SOLUTION ORAL DAILY
Status: DISCONTINUED | OUTPATIENT
Start: 2023-10-22 | End: 2023-10-27 | Stop reason: HOSPADM

## 2023-10-22 RX ORDER — PREDNISONE 20 MG/1
40 TABLET ORAL DAILY
Status: DISCONTINUED | OUTPATIENT
Start: 2023-10-23 | End: 2023-10-26

## 2023-10-22 RX ADMIN — METHYLPREDNISOLONE SODIUM SUCCINATE 40 MG: 40 INJECTION, POWDER, FOR SOLUTION INTRAMUSCULAR; INTRAVENOUS at 11:57

## 2023-10-22 RX ADMIN — ARFORMOTEROL TARTRATE 15 MCG: 15 SOLUTION RESPIRATORY (INHALATION) at 18:38

## 2023-10-22 RX ADMIN — IPRATROPIUM BROMIDE AND ALBUTEROL SULFATE 1 DOSE: .5; 2.5 SOLUTION RESPIRATORY (INHALATION) at 14:41

## 2023-10-22 RX ADMIN — POLYETHYLENE GLYCOL 3350 17 G: 17 POWDER, FOR SOLUTION ORAL at 18:41

## 2023-10-22 RX ADMIN — IPRATROPIUM BROMIDE AND ALBUTEROL SULFATE 1 DOSE: .5; 2.5 SOLUTION RESPIRATORY (INHALATION) at 10:14

## 2023-10-22 RX ADMIN — AZITHROMYCIN DIHYDRATE 500 MG: 250 TABLET, FILM COATED ORAL at 08:43

## 2023-10-22 RX ADMIN — DULOXETINE HYDROCHLORIDE 20 MG: 20 CAPSULE, DELAYED RELEASE ORAL at 08:43

## 2023-10-22 RX ADMIN — PIPERACILLIN AND TAZOBACTAM 4500 MG: 4; .5 INJECTION, POWDER, LYOPHILIZED, FOR SOLUTION INTRAVENOUS at 14:42

## 2023-10-22 RX ADMIN — ENOXAPARIN SODIUM 40 MG: 100 INJECTION SUBCUTANEOUS at 08:42

## 2023-10-22 RX ADMIN — Medication 10 ML: at 20:45

## 2023-10-22 RX ADMIN — BUPRENORPHINE HYDROCHLORIDE AND NALOXONE HYDROCHLORIDE DIHYDRATE 1 TABLET: 8; 2 TABLET SUBLINGUAL at 20:45

## 2023-10-22 RX ADMIN — ONDANSETRON 4 MG: 2 INJECTION INTRAMUSCULAR; INTRAVENOUS at 04:43

## 2023-10-22 RX ADMIN — BUPRENORPHINE HYDROCHLORIDE AND NALOXONE HYDROCHLORIDE DIHYDRATE 1 TABLET: 8; 2 TABLET SUBLINGUAL at 08:44

## 2023-10-22 RX ADMIN — IPRATROPIUM BROMIDE AND ALBUTEROL SULFATE 1 DOSE: .5; 2.5 SOLUTION RESPIRATORY (INHALATION) at 06:17

## 2023-10-22 RX ADMIN — METHYLPREDNISOLONE SODIUM SUCCINATE 40 MG: 40 INJECTION, POWDER, FOR SOLUTION INTRAMUSCULAR; INTRAVENOUS at 06:15

## 2023-10-22 RX ADMIN — BUDESONIDE INHALATION 500 MCG: 0.5 SUSPENSION RESPIRATORY (INHALATION) at 18:38

## 2023-10-22 RX ADMIN — BUDESONIDE INHALATION 500 MCG: 0.5 SUSPENSION RESPIRATORY (INHALATION) at 06:17

## 2023-10-22 RX ADMIN — IPRATROPIUM BROMIDE AND ALBUTEROL SULFATE 1 DOSE: .5; 2.5 SOLUTION RESPIRATORY (INHALATION) at 18:38

## 2023-10-22 RX ADMIN — GUAIFENESIN 600 MG: 600 TABLET, EXTENDED RELEASE ORAL at 08:44

## 2023-10-22 RX ADMIN — ACETAMINOPHEN 650 MG: 325 TABLET ORAL at 11:57

## 2023-10-22 RX ADMIN — ASPIRIN 81 MG CHEWABLE TABLET 81 MG: 81 TABLET CHEWABLE at 08:43

## 2023-10-22 RX ADMIN — TRAZODONE HYDROCHLORIDE 50 MG: 50 TABLET ORAL at 20:46

## 2023-10-22 RX ADMIN — ATORVASTATIN CALCIUM 40 MG: 40 TABLET, FILM COATED ORAL at 20:45

## 2023-10-22 RX ADMIN — PIPERACILLIN AND TAZOBACTAM 4500 MG: 4; .5 INJECTION, POWDER, LYOPHILIZED, FOR SOLUTION INTRAVENOUS at 23:28

## 2023-10-22 RX ADMIN — METHYLPREDNISOLONE SODIUM SUCCINATE 40 MG: 40 INJECTION, POWDER, FOR SOLUTION INTRAMUSCULAR; INTRAVENOUS at 01:50

## 2023-10-22 RX ADMIN — Medication 10 ML: at 08:42

## 2023-10-22 RX ADMIN — GUAIFENESIN 600 MG: 600 TABLET, EXTENDED RELEASE ORAL at 20:45

## 2023-10-22 RX ADMIN — BUPRENORPHINE HYDROCHLORIDE AND NALOXONE HYDROCHLORIDE DIHYDRATE 1 TABLET: 8; 2 TABLET SUBLINGUAL at 14:37

## 2023-10-22 RX ADMIN — ARFORMOTEROL TARTRATE 15 MCG: 15 SOLUTION RESPIRATORY (INHALATION) at 06:17

## 2023-10-22 ASSESSMENT — ENCOUNTER SYMPTOMS
FACIAL SWELLING: 0
BLOOD IN STOOL: 0
SINUS PAIN: 0
ABDOMINAL PAIN: 0
RHINORRHEA: 0
CONSTIPATION: 0
ABDOMINAL DISTENTION: 0
DIARRHEA: 0
WHEEZING: 0
CHEST TIGHTNESS: 0
COUGH: 1
SHORTNESS OF BREATH: 0

## 2023-10-22 ASSESSMENT — PAIN DESCRIPTION - LOCATION: LOCATION: HEAD

## 2023-10-22 ASSESSMENT — PAIN SCALES - GENERAL: PAINLEVEL_OUTOF10: 10

## 2023-10-23 ENCOUNTER — APPOINTMENT (OUTPATIENT)
Dept: GENERAL RADIOLOGY | Age: 73
End: 2023-10-23
Payer: COMMERCIAL

## 2023-10-23 LAB
ALBUMIN SERPL-MCNC: 3.4 G/DL (ref 3.5–5.2)
ALP SERPL-CCNC: 76 U/L (ref 35–104)
ALT SERPL-CCNC: 10 U/L (ref 0–32)
ANION GAP SERPL CALCULATED.3IONS-SCNC: 10 MMOL/L (ref 7–16)
AST SERPL-CCNC: 11 U/L (ref 0–31)
BASOPHILS # BLD: 0.02 K/UL (ref 0–0.2)
BASOPHILS NFR BLD: 0 % (ref 0–2)
BILIRUB SERPL-MCNC: 0.2 MG/DL (ref 0–1.2)
BUN SERPL-MCNC: 29 MG/DL (ref 6–23)
CALCIUM SERPL-MCNC: 9.9 MG/DL (ref 8.6–10.2)
CHLORIDE SERPL-SCNC: 105 MMOL/L (ref 98–107)
CO2 SERPL-SCNC: 29 MMOL/L (ref 22–29)
CREAT SERPL-MCNC: 0.5 MG/DL (ref 0.5–1)
EOSINOPHIL # BLD: 0 K/UL (ref 0.05–0.5)
EOSINOPHILS RELATIVE PERCENT: 0 % (ref 0–6)
ERYTHROCYTE [DISTWIDTH] IN BLOOD BY AUTOMATED COUNT: 13.7 % (ref 11.5–15)
GFR SERPL CREATININE-BSD FRML MDRD: >60 ML/MIN/1.73M2
GLUCOSE SERPL-MCNC: 95 MG/DL (ref 74–99)
HCT VFR BLD AUTO: 35.6 % (ref 34–48)
HGB BLD-MCNC: 11.5 G/DL (ref 11.5–15.5)
IMM GRANULOCYTES # BLD AUTO: 0.13 K/UL (ref 0–0.58)
IMM GRANULOCYTES NFR BLD: 1 % (ref 0–5)
L PNEUMO1 AG UR QL IA.RAPID: NEGATIVE
LYMPHOCYTES NFR BLD: 2.18 K/UL (ref 1.5–4)
LYMPHOCYTES RELATIVE PERCENT: 13 % (ref 20–42)
MCH RBC QN AUTO: 29.6 PG (ref 26–35)
MCHC RBC AUTO-ENTMCNC: 32.3 G/DL (ref 32–34.5)
MCV RBC AUTO: 91.5 FL (ref 80–99.9)
MONOCYTES NFR BLD: 1.41 K/UL (ref 0.1–0.95)
MONOCYTES NFR BLD: 8 % (ref 2–12)
NEUTROPHILS NFR BLD: 78 % (ref 43–80)
NEUTS SEG NFR BLD: 13.18 K/UL (ref 1.8–7.3)
PLATELET # BLD AUTO: 397 K/UL (ref 130–450)
PMV BLD AUTO: 9.7 FL (ref 7–12)
POTASSIUM SERPL-SCNC: 3.5 MMOL/L (ref 3.5–5)
PROT SERPL-MCNC: 6.6 G/DL (ref 6.4–8.3)
RBC # BLD AUTO: 3.89 M/UL (ref 3.5–5.5)
S PNEUM AG SPEC QL: NEGATIVE
SODIUM SERPL-SCNC: 144 MMOL/L (ref 132–146)
SPECIMEN SOURCE: NORMAL
WBC OTHER # BLD: 16.9 K/UL (ref 4.5–11.5)

## 2023-10-23 PROCEDURE — 6360000002 HC RX W HCPCS: Performed by: FAMILY MEDICINE

## 2023-10-23 PROCEDURE — 92526 ORAL FUNCTION THERAPY: CPT | Performed by: SPEECH-LANGUAGE PATHOLOGIST

## 2023-10-23 PROCEDURE — 6370000000 HC RX 637 (ALT 250 FOR IP): Performed by: FAMILY MEDICINE

## 2023-10-23 PROCEDURE — 1200000000 HC SEMI PRIVATE

## 2023-10-23 PROCEDURE — 6360000002 HC RX W HCPCS: Performed by: INTERNAL MEDICINE

## 2023-10-23 PROCEDURE — 2580000003 HC RX 258: Performed by: INTERNAL MEDICINE

## 2023-10-23 PROCEDURE — 99255 IP/OBS CONSLTJ NEW/EST HI 80: CPT | Performed by: INTERNAL MEDICINE

## 2023-10-23 PROCEDURE — 2580000003 HC RX 258: Performed by: FAMILY MEDICINE

## 2023-10-23 PROCEDURE — 92611 MOTION FLUOROSCOPY/SWALLOW: CPT | Performed by: SPEECH-LANGUAGE PATHOLOGIST

## 2023-10-23 PROCEDURE — 93306 TTE W/DOPPLER COMPLETE: CPT

## 2023-10-23 PROCEDURE — 99232 SBSQ HOSP IP/OBS MODERATE 35: CPT | Performed by: INTERNAL MEDICINE

## 2023-10-23 PROCEDURE — 6370000000 HC RX 637 (ALT 250 FOR IP): Performed by: INTERNAL MEDICINE

## 2023-10-23 PROCEDURE — 74230 X-RAY XM SWLNG FUNCJ C+: CPT

## 2023-10-23 PROCEDURE — 2500000003 HC RX 250 WO HCPCS: Performed by: INTERNAL MEDICINE

## 2023-10-23 PROCEDURE — 94640 AIRWAY INHALATION TREATMENT: CPT

## 2023-10-23 PROCEDURE — 36415 COLL VENOUS BLD VENIPUNCTURE: CPT

## 2023-10-23 PROCEDURE — 2700000000 HC OXYGEN THERAPY PER DAY

## 2023-10-23 PROCEDURE — 85025 COMPLETE CBC W/AUTO DIFF WBC: CPT

## 2023-10-23 PROCEDURE — 80053 COMPREHEN METABOLIC PANEL: CPT

## 2023-10-23 PROCEDURE — 94660 CPAP INITIATION&MGMT: CPT

## 2023-10-23 RX ORDER — IPRATROPIUM BROMIDE AND ALBUTEROL SULFATE 2.5; .5 MG/3ML; MG/3ML
1 SOLUTION RESPIRATORY (INHALATION)
Status: DISCONTINUED | OUTPATIENT
Start: 2023-10-23 | End: 2023-10-27 | Stop reason: HOSPADM

## 2023-10-23 RX ORDER — ENOXAPARIN SODIUM 100 MG/ML
30 INJECTION SUBCUTANEOUS DAILY
Status: DISCONTINUED | OUTPATIENT
Start: 2023-10-23 | End: 2023-10-27 | Stop reason: HOSPADM

## 2023-10-23 RX ORDER — AZITHROMYCIN 250 MG/1
250 TABLET, FILM COATED ORAL DAILY
Status: COMPLETED | OUTPATIENT
Start: 2023-10-24 | End: 2023-10-26

## 2023-10-23 RX ADMIN — ACETAMINOPHEN 650 MG: 325 TABLET ORAL at 23:38

## 2023-10-23 RX ADMIN — ENOXAPARIN SODIUM 30 MG: 100 INJECTION SUBCUTANEOUS at 12:36

## 2023-10-23 RX ADMIN — Medication 10 ML: at 12:42

## 2023-10-23 RX ADMIN — BARIUM SULFATE 45 ML: 400 SUSPENSION ORAL at 10:43

## 2023-10-23 RX ADMIN — POTASSIUM BICARBONATE 20 MEQ: 782 TABLET, EFFERVESCENT ORAL at 15:29

## 2023-10-23 RX ADMIN — ARFORMOTEROL TARTRATE 15 MCG: 15 SOLUTION RESPIRATORY (INHALATION) at 16:57

## 2023-10-23 RX ADMIN — TRAZODONE HYDROCHLORIDE 50 MG: 50 TABLET ORAL at 20:24

## 2023-10-23 RX ADMIN — BARIUM SULFATE 45 ML: 400 PASTE ORAL at 10:42

## 2023-10-23 RX ADMIN — ASPIRIN 81 MG CHEWABLE TABLET 81 MG: 81 TABLET CHEWABLE at 12:35

## 2023-10-23 RX ADMIN — IPRATROPIUM BROMIDE AND ALBUTEROL SULFATE 1 DOSE: .5; 2.5 SOLUTION RESPIRATORY (INHALATION) at 04:55

## 2023-10-23 RX ADMIN — GUAIFENESIN 600 MG: 600 TABLET, EXTENDED RELEASE ORAL at 20:24

## 2023-10-23 RX ADMIN — AZITHROMYCIN DIHYDRATE 500 MG: 250 TABLET, FILM COATED ORAL at 12:35

## 2023-10-23 RX ADMIN — ACETAMINOPHEN 650 MG: 325 TABLET ORAL at 14:12

## 2023-10-23 RX ADMIN — BUDESONIDE INHALATION 500 MCG: 0.5 SUSPENSION RESPIRATORY (INHALATION) at 16:57

## 2023-10-23 RX ADMIN — ARFORMOTEROL TARTRATE 15 MCG: 15 SOLUTION RESPIRATORY (INHALATION) at 04:55

## 2023-10-23 RX ADMIN — IPRATROPIUM BROMIDE AND ALBUTEROL SULFATE 1 DOSE: .5; 2.5 SOLUTION RESPIRATORY (INHALATION) at 14:18

## 2023-10-23 RX ADMIN — ATORVASTATIN CALCIUM 40 MG: 40 TABLET, FILM COATED ORAL at 20:24

## 2023-10-23 RX ADMIN — ONDANSETRON 4 MG: 2 INJECTION INTRAMUSCULAR; INTRAVENOUS at 06:44

## 2023-10-23 RX ADMIN — WATER 1000 MG: 1 INJECTION INTRAMUSCULAR; INTRAVENOUS; SUBCUTANEOUS at 15:30

## 2023-10-23 RX ADMIN — BUPRENORPHINE HYDROCHLORIDE AND NALOXONE HYDROCHLORIDE DIHYDRATE 1 TABLET: 8; 2 TABLET SUBLINGUAL at 12:35

## 2023-10-23 RX ADMIN — ONDANSETRON 4 MG: 4 TABLET, ORALLY DISINTEGRATING ORAL at 23:38

## 2023-10-23 RX ADMIN — BUPRENORPHINE HYDROCHLORIDE AND NALOXONE HYDROCHLORIDE DIHYDRATE 1 TABLET: 8; 2 TABLET SUBLINGUAL at 20:24

## 2023-10-23 RX ADMIN — BARIUM SULFATE 45 G: 0.81 POWDER, FOR SUSPENSION ORAL at 10:43

## 2023-10-23 RX ADMIN — PREDNISONE 40 MG: 20 TABLET ORAL at 12:35

## 2023-10-23 RX ADMIN — BUDESONIDE INHALATION 500 MCG: 0.5 SUSPENSION RESPIRATORY (INHALATION) at 04:55

## 2023-10-23 RX ADMIN — ONDANSETRON 4 MG: 4 TABLET, ORALLY DISINTEGRATING ORAL at 12:34

## 2023-10-23 RX ADMIN — ACETAMINOPHEN 650 MG: 325 TABLET ORAL at 01:23

## 2023-10-23 RX ADMIN — DULOXETINE HYDROCHLORIDE 20 MG: 20 CAPSULE, DELAYED RELEASE ORAL at 12:42

## 2023-10-23 RX ADMIN — GUAIFENESIN 600 MG: 600 TABLET, EXTENDED RELEASE ORAL at 12:35

## 2023-10-23 RX ADMIN — PIPERACILLIN AND TAZOBACTAM 4500 MG: 4; .5 INJECTION, POWDER, LYOPHILIZED, FOR SOLUTION INTRAVENOUS at 06:48

## 2023-10-23 RX ADMIN — POLYETHYLENE GLYCOL 3350 17 G: 17 POWDER, FOR SOLUTION ORAL at 12:34

## 2023-10-23 RX ADMIN — IPRATROPIUM BROMIDE AND ALBUTEROL SULFATE 1 DOSE: .5; 2.5 SOLUTION RESPIRATORY (INHALATION) at 16:56

## 2023-10-23 RX ADMIN — IPRATROPIUM BROMIDE AND ALBUTEROL SULFATE 1 DOSE: .5; 2.5 SOLUTION RESPIRATORY (INHALATION) at 22:25

## 2023-10-23 RX ADMIN — PIPERACILLIN AND TAZOBACTAM 4500 MG: 4; .5 INJECTION, POWDER, LYOPHILIZED, FOR SOLUTION INTRAVENOUS at 14:06

## 2023-10-23 ASSESSMENT — PAIN SCALES - GENERAL
PAINLEVEL_OUTOF10: 9
PAINLEVEL_OUTOF10: 0
PAINLEVEL_OUTOF10: 9
PAINLEVEL_OUTOF10: 9

## 2023-10-23 ASSESSMENT — PAIN DESCRIPTION - LOCATION
LOCATION: HEAD
LOCATION: BACK
LOCATION: HEAD

## 2023-10-23 ASSESSMENT — PAIN DESCRIPTION - DESCRIPTORS
DESCRIPTORS: ACHING
DESCRIPTORS: ACHING

## 2023-10-23 NOTE — ACP (ADVANCE CARE PLANNING)
Advance Care Planning   Healthcare Decision Maker:    Primary Decision Maker: Maude Oppenheim - [facility] - [phone]    Secondary Decision Maker: Eloy Curtis - [phone]

## 2023-10-23 NOTE — PROGRESS NOTES
Pharmacist Review and Automatic Dose Adjustment of Prophylactic Enoxaparin         The reviewing pharmacist has made an adjustment to the ordered enoxaparin dose or converted to UFH per the approved [facility] protocol and table as identified below. Emily Mendez is a 72 y.o. female. Recent Labs     10/21/23  0704 10/22/23  0552 10/23/23  0506   CREATININE 0.4* 0.4* 0.5       Estimated Creatinine Clearance: 72 mL/min (based on SCr of 0.5 mg/dL). Recent Labs     10/22/23  0552 10/23/23  0506   HGB 11.4* 11.5   HCT 36.0 35.6    397     No results for input(s): \"INR\" in the last 72 hours.     Height:   Ht Readings from Last 1 Encounters:   10/21/23 1.651 m (5' 5\")     Weight:  Wt Readings from Last 1 Encounters:   10/21/23 44 kg (97 lb)               Plan: Based upon the patient's weight and renal function    Ordered: Enoxaparin 40mg SUBQ Daily    Changed/converted to    New Order: Enoxaparin 30mg SUBQ Daily      Thank you,  Fannie Garcia, [facility]  10/23/2023, 10:08 AM

## 2023-10-23 NOTE — CARE COORDINATION
Case Management Assessment  Initial Evaluation    Date/Time of Evaluation: 10/23/2023 1:12 PM  Assessment Completed by: Brenda Edwards RN    If patient is discharged prior to next notation, then this note serves as note for discharge by case management. Patient Name: Bel Fernández                   YOB: 1950  Diagnosis: COPD exacerbation ([address]) [J44.1]  COPD with acute exacerbation ([address]) [J44.1]                   Date / Time: 10/20/2023  3:36 PM    Patient Admission Status: Inpatient   Readmission Risk (Low < 19, Mod (19-27), High > 27): Readmission Risk Score: 11.8    Current PCP: Edmond Sandhoff, DO  PCP verified by CM? Yes    Chart Reviewed: Yes      History Provided by: Patient  Patient Orientation: Alert and Oriented, Person, Place, Situation    Patient Cognition: Alert    Hospitalization in the last 30 days (Readmission):  No    Advance Directives:      Code Status: Full Code   Patient's Primary Decision Maker is: Legal Next of Kin    Primary Decision MakerStecary Mike  Julia - [phone]    Secondary Decision Maker: Idania Wright - [phone]    Discharge Planning:    Patient lives with: Family Members Type of Home: House  Primary Care Giver: Self  Patient Support Systems include: Family Members   Current Financial resources:    Current community resources:    Current services prior to admission: Oxygen Therapy            Current DME:              Type of Home Care services:  None    ADLS  Prior functional level: Assistance with the following:, Shopping, Cooking, Housework  Current functional level: Assistance with the following:, Cooking, Housework, Shopping    PT AM-PAC:   /24  OT AM-PAC:   /24    Family can provide assistance at DC: Yes  Would you like Case Management to discuss the discharge plan with any other family members/significant others, and if so, who?  Yes (if needed with francisca Ricardo)  Plans to Return to Present Housing: Yes    Potential Assistance

## 2023-10-23 NOTE — CONSULTS
Today's Date: 10/23/2023  Patient Name: Lopez Edwards  Date of admission: 10/20/2023  3:36 PM  Patient's age: 72 y.o., 1950female    Admission Dx: COPD exacerbation ([address]) [J44.1]  COPD with acute exacerbation ([address]) [J44.1]    Requesting Physician: Willy Larios DO    Chief Complaint   Patient presents with    Emesis     X1 day. Reports that it started this morning. Patient reporting increased weakness. HISTORY OF PRESENT ILLNESS:      62-year-old pleasant lady that I have been following since  when she had cardiac catheterization and stenting of her RCA, she is not to have COPD and has smoked for many years since he was teenager, she is on oxygen dependent, she also has hypertension and hyperlipidemia, presented to the hospital for worsening shortness of breath for the past couple days prior to admission as well as nausea and vomiting. She denied any chest pain. High-sensitivity troponin showed minimal elevation. EKG was normal.  She is being treated for COPD exacerbation/bronchitis/pneumonia. She had an echocardiogram that showed normal LV function and moderate mitral regurgitation with normally estimated pulmonary artery systolic pressure. REVIEW OF SYSTEMS:    A comprehensive 14 point review of systems was negative except for what is in the HPI       Past Medical History: Coronary artery disease. ___09 Two stents done by myself to mid RCA with use of drug-eluting stents. She had inferior hypokinesis with ejection fraction of 50%. ___ COPD. ___ Chronic back pain. ___ Arthritis. ___   Past Surgical History: Partial hysterectomy. Cyst removed from both breasts. Kyphoplasty. Known Allergies: TORADOL, TYLENOL #3 and COMPAZINE. Family History: Mother  of massive heart attack at age 66. Sister has coronary disease. Social History:   Smoked one pack per day for many years. Currently smokes only five to six cigarettes a day. Social alcohol drinking.    Master Medication nodules are present laterally and anteriorly. Upper Abdomen:  Limited images of the upper abdomen demonstrate no acute abnormality. Soft Tissues/Bones: No acute osseous abnormality or evidence of an aggressive osseous lesion. Emphysematous changes with a moderate amount of endobronchial debris in the lower lobes bilaterally with bronchial wall thickening and subpleural consolidative airspace opacities, suggestive of multifocal pneumonia. Consider aspiration as an etiology given the endobronchial debris. Suspicious 7 mm nodule in the subpleural distribution of the left lower lobe. A 3-6 month follow-up chest CT is recommended. See below for details. RECOMMENDATIONS: Multiple pulmonary nodules. Most significant: 7 mm left solid pulmonary nodule. Per Fleischner Society Guidelines, recommend a non-contrast Chest CT at 3-6 months, then another non-contrast Chest CT at 18-24 months. These guidelines do not apply to immunocompromised patients and patients with cancer. Follow up in patients with significant comorbidities as clinically warranted. For lung cancer screening, adhere to Lung-RADS guidelines. Reference: Radiology. 2017; 284(1):228-43. XR CHEST PORTABLE    Result Date: 10/20/2023  EXAMINATION: ONE XRAY VIEW OF THE CHEST 10/20/2023 4:30 pm COMPARISON: 06/06/2023 HISTORY: ORDERING SYSTEM PROVIDED HISTORY: cough, congestion TECHNOLOGIST PROVIDED HISTORY: Reason for exam:->cough, congestion FINDINGS: The lungs are without acute focal process. There is no effusion or pneumothorax. The cardiomediastinal silhouette is without acute process. The osseous structures are without acute process. No acute process. IMPRESSION:          Acute COPD exacerbation. Asymptomatic coronary artery disease. Minor elevation of high-sensitivity troponin without chest pain. Previous PCI of RCA done May 2009.   Moderate mitral regurgitation without pulmonary

## 2023-10-23 NOTE — CONSULTS
Pulmonary/Critical Care Consult Note    CHIEF COMPLAINT: COPD with exacerbation, acute superimposed on chronic respiratory failure, pneumonia    HISTORY OF PRESENT ILLNESS: The patient is a 81-year-old female who has been smoking since age 17 or 15 at a rate of 1 pack/day, recently decreased to 1/2 pack/day. She lives with her son and daughter-in-law and both of them smoke. Over the last several days, she has been increasingly fatigued and short of breath with a poor appetite. She has tried to cough up sputum but has been unable to do so. She is been placed on azithromycin and Zosyn as well as oral prednisone. I reviewed the patient's CT scan which shows scattered tree-in-bud infiltrates and secretion filled bronchi. She is being treated with aerosolized bronchodilators, bronchopulmonary hygiene, and mucolytic drugs. She has not been out of bed for several days except go to the bathroom. I am asked the patient's nurse to mobilize her into a chair. There is a 7 mm nodule in the left lower lobe which will require follow-up. ALLERGY:  Demerol [meperidine hcl], Ultram [tramadol], Compazine [prochlorperazine], Ibuprofen, Toradol [ketorolac tromethamine], and Tylenol with codeine #3 [acetaminophen-codeine]    FAMILY HISTORY:  Family History   Problem Relation Age of Onset    Cancer Brother         Prostate    Other Son         Sarcoma       SOCIAL HISTORY:  Social History     Socioeconomic History    Marital status:       Spouse name: Not on file    Number of children: Not on file    Years of education: Not on file    Highest education level: Not on file   Occupational History    Not on file   Tobacco Use    Smoking status: Every Day     Packs/day: 0.25     Years: 56.00     Additional pack years: 0.00     Total pack years: 14.00     Types: Cigarettes     Start date: 5    Smokeless tobacco: Never   Vaping Use    Vaping Use: Never used   Substance and Sexual Activity    Alcohol use: Not Currently

## 2023-10-23 NOTE — PROGRESS NOTES
SPEECH/LANGUAGE PATHOLOGY  VIDEOFLUOROSCOPIC STUDY OF SWALLOWING (MBS)   and PLAN OF CARE    PATIENT NAME:  Nathanael Santillan  (female)     MRN:  [id number]    :  1950  (72 y.o.)  STATUS:  Inpatient: Room 0422/0422-01    TODAY'S DATE:  10/23/2023  REFERRING PROVIDER:     SLP swallowing-dysphagia evaluation and treatment  Start:  10/22/23 1400,   End:  10/22/23 1400,   ONE TIME,   Standing Count:  1 Occurrences,   R         Janice Braga MD    SPECIFIC PROVIDER ORDER: SLP video swallow  Date of order:  10/22/23   REASON FOR REFERRAL: dysphagia    EVALUATING THERAPIST: DAVID Vaughn      RESULTS:      DYSPHAGIA DIAGNOSIS:  moderate oropharyngeal phase dysphagia     DIET RECOMMENDATIONS:  Regular consistency solids (IDDSI level 7) with  thin liquids (IDDSI level 0) WITH a throat clear    FEEDING RECOMMENDATIONS:    Assistance level:  Encourage self-feeding     Compensatory strategies recommended: Chin neutral to slightly down  and Throat clear     Discussed recommendations with nursing and/or faxed report to referring provider: No secondary to no diet/liquid change recommended     Laryngeal Penetration and Aspiration:  Penetration WITHOUT aspiration was observed in today's study with  thin liquid, mildly thick liquid (nectar)    SPEECH THERAPY  PLAN OF CARE   The dysphagia POC is established based on physician order and dysphagia diagnosis    Skilled SLP intervention for dysphagia management on acute care up to 5 x per week until goals met, pt plateaus in function and/or discharged from hospital      Conditions Requiring Skilled Therapeutic Intervention for dysphagia:    repetitive/disorganized lingual motion   impaired mastication   swallow triggered once bolus head entered the laryngeal vestibule which increases risk of aspiration   impaired laryngeal closure resulting in contrast entering the laryngeal vestibule      SPECIFIC DYSPHAGIA INTERVENTIONS TO INCLUDE:     compensatory swallowing strategies to

## 2023-10-23 NOTE — PLAN OF CARE
Problem: Safety - Adult  Goal: Free from fall injury  Outcome: Progressing     Problem: Respiratory - Adult  Goal: Achieves optimal ventilation and oxygenation  Outcome: Progressing     Problem: Gastrointestinal - Adult  Goal: Minimal or absence of nausea and vomiting  Outcome: Not Progressing

## 2023-10-24 ENCOUNTER — APPOINTMENT (OUTPATIENT)
Dept: GENERAL RADIOLOGY | Age: 73
End: 2023-10-24
Payer: COMMERCIAL

## 2023-10-24 LAB
ALBUMIN SERPL-MCNC: 3.6 G/DL (ref 3.5–5.2)
ALP SERPL-CCNC: 70 U/L (ref 35–104)
ALT SERPL-CCNC: 13 U/L (ref 0–32)
ANION GAP SERPL CALCULATED.3IONS-SCNC: 7 MMOL/L (ref 7–16)
AST SERPL-CCNC: 14 U/L (ref 0–31)
BASOPHILS # BLD: 0.02 K/UL (ref 0–0.2)
BASOPHILS NFR BLD: 0 % (ref 0–2)
BILIRUB SERPL-MCNC: 0.2 MG/DL (ref 0–1.2)
BUN SERPL-MCNC: 24 MG/DL (ref 6–23)
CALCIUM SERPL-MCNC: 9.9 MG/DL (ref 8.6–10.2)
CHLORIDE SERPL-SCNC: 104 MMOL/L (ref 98–107)
CO2 SERPL-SCNC: 31 MMOL/L (ref 22–29)
CREAT SERPL-MCNC: 0.5 MG/DL (ref 0.5–1)
EOSINOPHIL # BLD: 0 K/UL (ref 0.05–0.5)
EOSINOPHILS RELATIVE PERCENT: 0 % (ref 0–6)
ERYTHROCYTE [DISTWIDTH] IN BLOOD BY AUTOMATED COUNT: 13.7 % (ref 11.5–15)
GFR SERPL CREATININE-BSD FRML MDRD: >60 ML/MIN/1.73M2
GLUCOSE SERPL-MCNC: 97 MG/DL (ref 74–99)
HCT VFR BLD AUTO: 37.1 % (ref 34–48)
HGB BLD-MCNC: 11.6 G/DL (ref 11.5–15.5)
IMM GRANULOCYTES # BLD AUTO: 0.08 K/UL (ref 0–0.58)
IMM GRANULOCYTES NFR BLD: 1 % (ref 0–5)
LYMPHOCYTES NFR BLD: 2.41 K/UL (ref 1.5–4)
LYMPHOCYTES RELATIVE PERCENT: 22 % (ref 20–42)
MAGNESIUM SERPL-MCNC: 2.5 MG/DL (ref 1.6–2.6)
MCH RBC QN AUTO: 29.1 PG (ref 26–35)
MCHC RBC AUTO-ENTMCNC: 31.3 G/DL (ref 32–34.5)
MCV RBC AUTO: 93 FL (ref 80–99.9)
MONOCYTES NFR BLD: 0.89 K/UL (ref 0.1–0.95)
MONOCYTES NFR BLD: 8 % (ref 2–12)
NEUTROPHILS NFR BLD: 68 % (ref 43–80)
NEUTS SEG NFR BLD: 7.34 K/UL (ref 1.8–7.3)
PHOSPHATE SERPL-MCNC: 2.4 MG/DL (ref 2.5–4.5)
PLATELET # BLD AUTO: 367 K/UL (ref 130–450)
PMV BLD AUTO: 9.7 FL (ref 7–12)
POTASSIUM SERPL-SCNC: 3.9 MMOL/L (ref 3.5–5)
PROT SERPL-MCNC: 6.6 G/DL (ref 6.4–8.3)
RBC # BLD AUTO: 3.99 M/UL (ref 3.5–5.5)
SODIUM SERPL-SCNC: 142 MMOL/L (ref 132–146)
WBC OTHER # BLD: 10.7 K/UL (ref 4.5–11.5)

## 2023-10-24 PROCEDURE — 36415 COLL VENOUS BLD VENIPUNCTURE: CPT

## 2023-10-24 PROCEDURE — 6370000000 HC RX 637 (ALT 250 FOR IP): Performed by: INTERNAL MEDICINE

## 2023-10-24 PROCEDURE — 80053 COMPREHEN METABOLIC PANEL: CPT

## 2023-10-24 PROCEDURE — 6370000000 HC RX 637 (ALT 250 FOR IP): Performed by: FAMILY MEDICINE

## 2023-10-24 PROCEDURE — 99232 SBSQ HOSP IP/OBS MODERATE 35: CPT | Performed by: INTERNAL MEDICINE

## 2023-10-24 PROCEDURE — 94640 AIRWAY INHALATION TREATMENT: CPT

## 2023-10-24 PROCEDURE — 71045 X-RAY EXAM CHEST 1 VIEW: CPT

## 2023-10-24 PROCEDURE — 97110 THERAPEUTIC EXERCISES: CPT | Performed by: PHYSICAL THERAPIST

## 2023-10-24 PROCEDURE — 94660 CPAP INITIATION&MGMT: CPT

## 2023-10-24 PROCEDURE — 6360000002 HC RX W HCPCS: Performed by: INTERNAL MEDICINE

## 2023-10-24 PROCEDURE — 2580000003 HC RX 258: Performed by: FAMILY MEDICINE

## 2023-10-24 PROCEDURE — 6360000002 HC RX W HCPCS: Performed by: FAMILY MEDICINE

## 2023-10-24 PROCEDURE — 97535 SELF CARE MNGMENT TRAINING: CPT

## 2023-10-24 PROCEDURE — 84100 ASSAY OF PHOSPHORUS: CPT

## 2023-10-24 PROCEDURE — 94667 MNPJ CHEST WALL 1ST: CPT

## 2023-10-24 PROCEDURE — 97165 OT EVAL LOW COMPLEX 30 MIN: CPT

## 2023-10-24 PROCEDURE — 2580000003 HC RX 258: Performed by: INTERNAL MEDICINE

## 2023-10-24 PROCEDURE — 94669 MECHANICAL CHEST WALL OSCILL: CPT

## 2023-10-24 PROCEDURE — 97530 THERAPEUTIC ACTIVITIES: CPT | Performed by: PHYSICAL THERAPIST

## 2023-10-24 PROCEDURE — 1200000000 HC SEMI PRIVATE

## 2023-10-24 PROCEDURE — 85025 COMPLETE CBC W/AUTO DIFF WBC: CPT

## 2023-10-24 PROCEDURE — 97161 PT EVAL LOW COMPLEX 20 MIN: CPT | Performed by: PHYSICAL THERAPIST

## 2023-10-24 PROCEDURE — 83735 ASSAY OF MAGNESIUM: CPT

## 2023-10-24 PROCEDURE — 2700000000 HC OXYGEN THERAPY PER DAY

## 2023-10-24 RX ADMIN — BUDESONIDE INHALATION 500 MCG: 0.5 SUSPENSION RESPIRATORY (INHALATION) at 04:44

## 2023-10-24 RX ADMIN — ACETAMINOPHEN 650 MG: 325 TABLET ORAL at 21:29

## 2023-10-24 RX ADMIN — BUDESONIDE INHALATION 500 MCG: 0.5 SUSPENSION RESPIRATORY (INHALATION) at 16:44

## 2023-10-24 RX ADMIN — BUPRENORPHINE HYDROCHLORIDE AND NALOXONE HYDROCHLORIDE DIHYDRATE 1 TABLET: 8; 2 TABLET SUBLINGUAL at 09:29

## 2023-10-24 RX ADMIN — IPRATROPIUM BROMIDE AND ALBUTEROL SULFATE 1 DOSE: .5; 2.5 SOLUTION RESPIRATORY (INHALATION) at 02:19

## 2023-10-24 RX ADMIN — ARFORMOTEROL TARTRATE 15 MCG: 15 SOLUTION RESPIRATORY (INHALATION) at 04:44

## 2023-10-24 RX ADMIN — WATER 1000 MG: 1 INJECTION INTRAMUSCULAR; INTRAVENOUS; SUBCUTANEOUS at 14:50

## 2023-10-24 RX ADMIN — ARFORMOTEROL TARTRATE 15 MCG: 15 SOLUTION RESPIRATORY (INHALATION) at 16:44

## 2023-10-24 RX ADMIN — BUPRENORPHINE HYDROCHLORIDE AND NALOXONE HYDROCHLORIDE DIHYDRATE 1 TABLET: 8; 2 TABLET SUBLINGUAL at 14:52

## 2023-10-24 RX ADMIN — ACETAMINOPHEN 650 MG: 325 TABLET ORAL at 09:29

## 2023-10-24 RX ADMIN — POLYETHYLENE GLYCOL 3350 17 G: 17 POWDER, FOR SOLUTION ORAL at 09:31

## 2023-10-24 RX ADMIN — Medication 10 ML: at 21:32

## 2023-10-24 RX ADMIN — TRAZODONE HYDROCHLORIDE 50 MG: 50 TABLET ORAL at 21:30

## 2023-10-24 RX ADMIN — IPRATROPIUM BROMIDE AND ALBUTEROL SULFATE 1 DOSE: .5; 2.5 SOLUTION RESPIRATORY (INHALATION) at 22:04

## 2023-10-24 RX ADMIN — AZITHROMYCIN DIHYDRATE 250 MG: 250 TABLET ORAL at 09:30

## 2023-10-24 RX ADMIN — IPRATROPIUM BROMIDE AND ALBUTEROL SULFATE 1 DOSE: .5; 2.5 SOLUTION RESPIRATORY (INHALATION) at 09:16

## 2023-10-24 RX ADMIN — PREDNISONE 40 MG: 20 TABLET ORAL at 09:29

## 2023-10-24 RX ADMIN — GUAIFENESIN 600 MG: 600 TABLET, EXTENDED RELEASE ORAL at 21:28

## 2023-10-24 RX ADMIN — Medication 10 ML: at 14:52

## 2023-10-24 RX ADMIN — ATORVASTATIN CALCIUM 40 MG: 40 TABLET, FILM COATED ORAL at 21:27

## 2023-10-24 RX ADMIN — GUAIFENESIN 600 MG: 600 TABLET, EXTENDED RELEASE ORAL at 09:29

## 2023-10-24 RX ADMIN — IPRATROPIUM BROMIDE AND ALBUTEROL SULFATE 1 DOSE: .5; 2.5 SOLUTION RESPIRATORY (INHALATION) at 13:12

## 2023-10-24 RX ADMIN — ASPIRIN 81 MG CHEWABLE TABLET 81 MG: 81 TABLET CHEWABLE at 09:29

## 2023-10-24 RX ADMIN — BUPRENORPHINE HYDROCHLORIDE AND NALOXONE HYDROCHLORIDE DIHYDRATE 1 TABLET: 8; 2 TABLET SUBLINGUAL at 21:28

## 2023-10-24 RX ADMIN — ONDANSETRON 4 MG: 4 TABLET, ORALLY DISINTEGRATING ORAL at 21:29

## 2023-10-24 RX ADMIN — DULOXETINE HYDROCHLORIDE 20 MG: 20 CAPSULE, DELAYED RELEASE ORAL at 09:34

## 2023-10-24 RX ADMIN — IPRATROPIUM BROMIDE AND ALBUTEROL SULFATE 1 DOSE: .5; 2.5 SOLUTION RESPIRATORY (INHALATION) at 04:44

## 2023-10-24 RX ADMIN — IPRATROPIUM BROMIDE AND ALBUTEROL SULFATE 1 DOSE: .5; 2.5 SOLUTION RESPIRATORY (INHALATION) at 16:44

## 2023-10-24 RX ADMIN — ENOXAPARIN SODIUM 30 MG: 100 INJECTION SUBCUTANEOUS at 09:34

## 2023-10-24 ASSESSMENT — PAIN DESCRIPTION - LOCATION
LOCATION: HEAD
LOCATION: HEAD

## 2023-10-24 ASSESSMENT — PAIN DESCRIPTION - DESCRIPTORS: DESCRIPTORS: ACHING

## 2023-10-24 ASSESSMENT — PAIN SCALES - GENERAL: PAINLEVEL_OUTOF10: 3

## 2023-10-24 NOTE — PROGRESS NOTES
Albumin   Date Value Ref Range Status   10/24/2023 3.6 3.5 - 5.2 g/dL Final   10/23/2023 3.4 (L) 3.5 - 5.2 g/dL Final   10/22/2023 3.5 3.5 - 5.2 g/dL Final   04/03/2012 3.9 3.2 - 4.8 g/dL Final   12/09/2011 4.4 3.2 - 4.8 g/dL Final   11/22/2011 3.6 3.2 - 4.8 g/dL Final     Total Bilirubin   Date Value Ref Range Status   10/24/2023 0.2 0.0 - 1.2 mg/dL Final   10/23/2023 0.2 0.0 - 1.2 mg/dL Final   10/22/2023 0.2 0.0 - 1.2 mg/dL Final     Alkaline Phosphatase   Date Value Ref Range Status   10/24/2023 70 35 - 104 U/L Final   10/23/2023 76 35 - 104 U/L Final   10/22/2023 80 35 - 104 U/L Final     AST   Date Value Ref Range Status   10/24/2023 14 0 - 31 U/L Final   10/23/2023 11 0 - 31 U/L Final   10/22/2023 9 0 - 31 U/L Final     ALT   Date Value Ref Range Status   10/24/2023 13 0 - 32 U/L Final   10/23/2023 10 0 - 32 U/L Final   10/22/2023 8 0 - 32 U/L Final     Est, Glom Filt Rate   Date Value Ref Range Status   10/24/2023 >60 >60 mL/min/1.73m2 Final     Comment:           These results are not intended for use in patients <25years of age. eGFR results are calculated without a race factor using the 2021 CKD-EPI equation. Careful clinical correlation is recommended, particularly when comparing to results   calculated using previous equations. The CKD-EPI equation is less accurate in patients with extremes of muscle mass, extra-renal   metabolism of creatine, excessive creatine ingestion, or following therapy that affects   renal tubular secretion. 10/23/2023 >60 >60 mL/min/1.73m2 Final     Comment:           These results are not intended for use in patients <25years of age. eGFR results are calculated without a race factor using the 2021 CKD-EPI equation. Careful clinical correlation is recommended, particularly when comparing to results   calculated using previous equations.   The CKD-EPI equation is less accurate in patients with extremes of muscle mass, extra-renal   metabolism of creatine, excessive creatine ingestion, or following therapy that affects   renal tubular secretion. 10/22/2023 >60 >60 mL/min/1.73m2 Final     Comment:           These results are not intended for use in patients <25years of age. eGFR results are calculated without a race factor using the 2021 CKD-EPI equation. Careful clinical correlation is recommended, particularly when comparing to results   calculated using previous equations. The CKD-EPI equation is less accurate in patients with extremes of muscle mass, extra-renal   metabolism of creatine, excessive creatine ingestion, or following therapy that affects   renal tubular secretion. GFR    Date Value Ref Range Status   02/13/2022 >60  Final   02/10/2022 >60  Final   02/07/2022 >60  Final     Magnesium   Date Value Ref Range Status   10/24/2023 2.5 1.6 - 2.6 mg/dL Final   10/21/2023 2.3 1.6 - 2.6 mg/dL Final   06/06/2023 2.0 1.6 - 2.6 mg/dL Final     Phosphorus   Date Value Ref Range Status   10/24/2023 2.4 (L) 2.5 - 4.5 mg/dL Final   10/12/2019 3.9 2.5 - 4.5 mg/dL Final     No results for input(s): \"PH\", \"PO2\", \"PCO2\", \"HCO3\", \"BE\", \"O2SAT\" in the last 72 hours. RADIOLOGY:  XR CHEST PORTABLE   Final Result   Slight progression of atelectasis and or infiltrate at the left base. Obstructive airways disease. FL MODIFIED BARIUM SWALLOW W VIDEO   Final Result   1. During administration of thin barium there is penetration to the level of   vocal folds. There is no aspiration. 2. There is no evidence of aspiration or penetration with the nectar or   pudding consistency barium. Please see separate speech pathology report for full discussion of findings   and recommendations.          CT CHEST WO CONTRAST   Final Result   Emphysematous changes with a moderate amount of endobronchial debris in the   lower lobes bilaterally with bronchial wall thickening and subpleural   consolidative airspace opacities, suggestive of

## 2023-10-24 NOTE — CARE COORDINATION
10/24/2023 1037 CM note: Pt presents from home,she resides with her granddtr Alyce Negrete  and their 6 children in a 2 story home, pt resides on 1st level. She  has home [email or web address] [place]-portable and 10L cocentrator from [facility]. Pt is on suboxone and obtains med from [facility] every 2 weeks. Pt plans to return home at d/c and family will provide ride. Await PT/OT fiordaliza Burgess RN

## 2023-10-24 NOTE — PROGRESS NOTES
[facility]  [address] [place]        Date:10/24/2023                                                  Patient Name: Janet Gooden    MRN: [id number]    : 1950    Room: [address] [place] [address]      Evaluating OT: Larry Robb OTR/L; [id number]     Referring Provider and Specific Provider Orders/Date:      10/23/23 0845  OT eval and treat  Start:  10/23/23 0845,   End:  10/23/23 0845,   ONE TIME,   Standing Count:  1 Occurrences,   R         Tramaine Matos MD      Placement Recommendation: [facility]; pt is requesting portable oxygen       Diagnosis:   1.  COPD exacerbation ([address])         Surgery: none       Pertinent Medical History:       Past Medical History:   Diagnosis Date    Anxiety     Back pain, chronic     CAD (coronary artery disease)     Chronic obstructive pulmonary disease ([address]) 3/20/2022    Community acquired bacterial pneumonia 6/10/2023    Depression     History of cardiovascular stress test 2013    LEXISCAN STRESS    Hx of blood clots     Hyperlipemia     Hypertension     Pancreatitis          Past Surgical History:   Procedure Laterality Date    BACK SURGERY      CORONARY ANGIOPLASTY WITH STENT PLACEMENT      ECHO COMPL W DOP COLOR FLOW  1/10/2013         HAND SURGERY Right 10/12/2019    HAND INCISION AND DRAINAGE performed by Emma Ramos DO at [address] (CERVIX STATUS UNKNOWN)      TEMPOROMANDIBULAR JOINT SURGERY        Precautions:  Fall Risk, O2, Droplet: Rhinovirus       Assessment of current deficits:     [x] Functional mobility  [x]ADLs  [x] Strength               []Cognition    [x] Functional transfers   [x] IADLs         [] Safety Awareness   [x]Endurance    [] Fine Coordination              [x] Balance      [] Vision/perception   []Sensation     []Gross Motor Coordination  [] ROM  [] Delirium                   [] Motor Control     OT PLAN OF CARE   OT POC based on physician good  and wfl FMC/dexterity noted during ADL tasks   Improve overall RUE strength  for participation in functional tasks   LUE WFL 4/5 good  and wfl FMC/dexterity noted during ADL tasks   Improve overall LUE strength  for participation in functional tasks     Hearing: [facility]   Sensation:  No c/o numbness or tingling  Tone: WFL   Edema: no    Comments: Upon arrival the patient was supine. At end of session, patient was up in chair with call light and phone within reach, all lines and tubes intact. Overall patient demonstrated decreased independence and safety during completion of ADL/functional transfer/mobility tasks. Pt would benefit from continued skilled OT to increase safety and independence with completion of ADL/IADL tasks for functional independence and quality of life. Treatment: OT treatment provided this date includes:   Instruction/training on safety and adapted techniques for completion of ADLs   Instruction/training on safe functional mobility/transfer techniques   Instruction/training on energy conservation/work simplification for completion of ADLs   Instruction/training on proper positioning/alignment to prevent contractures     Rehab Potential: Good for established goals. Patient / Family Goal: return home       Patient and/or family were instructed on functional diagnosis, prognosis/goals and OT plan of care. Demonstrated good understanding.      Eval Complexity: Low    Time In: 10:40am   Time Out: 11:05am    Total Treatment Time: 10      Min Units   OT Eval Low 97165  X  1    OT Eval Medium 48505      OT Eval High 40795      OT Re-Eval [id number]            ADL/Self Care 19249 10 1    Therapeutic Activities 61224       Therapeutic Ex 49652       Orthotic Management 73623       Manual 21806     Neuro Re-Ed 59286       Non-Billable Time        Evaluation Time additionally includes thorough review of current medical information, gathering information on past medical history/social history

## 2023-10-24 NOTE — PROGRESS NOTES
Mobility        AM-[facility] Basic Mobility - Inpatient   How much help is needed turning from your back to your side while in a flat bed without using bedrails?: A Little  How much help is needed moving from lying on your back to sitting on the side of a flat bed without using bedrails?: A Little  How much help is needed moving to and from a bed to a chair?: A Little  How much help is needed standing up from a chair using your arms?: A Little  How much help is needed walking in hospital room?: A Little  How much help is needed climbing 3-5 steps with a railing?: A Little  AM-[facility] Inpatient Mobility Raw Score : 18  AM-PAC Inpatient T-Scale Score : 43.63  Mobility Inpatient CMS 0-100% Score: 46.58  Mobility Inpatient CMS G-Code Modifier : CK    Nursing cleared patient for PT evaluation. The admitting diagnosis and active problem list as listed above have been reviewed prior to the initiation of this evaluation. OBJECTIVE:   Initial Evaluation  Date: 10/24/2023 Treatment Date:     Short Term/ Long Term   Goals   Was pt agreeable to Eval/treatment? Yes  To be met in 3 days   Pain level   0/10        Bed Mobility  Using rails and head of bed elevated:       Rolling: Not assessed patient in chair      Rolling: Independent    Supine to sit:  Independent    Sit to supine: Independent    Scooting: Independent     Transfers Sit to stand: Supervision     Sit to stand: Independent     Ambulation     1 x 40 feet 1 x 20 feet using  no device with Supervision    for balance and O2 line management  and cues for safety, pacing, and pursed lip breathing    100 feet using  no device with Independent      Stair negotiation: ascended and descended   Not assessed     4 steps, without rail, Supervision         ROM Within functional limits    Increase range of motion 10% of affected joints    Strength BUE:  refer to OT eval  RLE:  3+/5  LLE:  3+/5  Increase strength in affected mm groups by 1/3 grade   Balance Sitting EOB:  not assessed CPT codes:  Low Complexity PT evaluation (47519)  Therapeutic activities (50929)   15 minutes  1 unit(s)  Therapeutic exercises (16799)   9 minutes  1 unit(s)    Sydni Calero PT

## 2023-10-25 PROBLEM — E43 SEVERE PROTEIN-CALORIE MALNUTRITION (HCC): Chronic | Status: ACTIVE | Noted: 2023-06-08

## 2023-10-25 PROBLEM — J44.1 COPD WITH ACUTE EXACERBATION (HCC): Status: ACTIVE | Noted: 2023-10-25

## 2023-10-25 PROCEDURE — 6360000002 HC RX W HCPCS: Performed by: INTERNAL MEDICINE

## 2023-10-25 PROCEDURE — 6360000002 HC RX W HCPCS: Performed by: FAMILY MEDICINE

## 2023-10-25 PROCEDURE — 2700000000 HC OXYGEN THERAPY PER DAY

## 2023-10-25 PROCEDURE — 6370000000 HC RX 637 (ALT 250 FOR IP): Performed by: INTERNAL MEDICINE

## 2023-10-25 PROCEDURE — 0012F CAP BACTERIAL ASSESS: CPT | Performed by: INTERNAL MEDICINE

## 2023-10-25 PROCEDURE — 94669 MECHANICAL CHEST WALL OSCILL: CPT

## 2023-10-25 PROCEDURE — 94640 AIRWAY INHALATION TREATMENT: CPT

## 2023-10-25 PROCEDURE — 99232 SBSQ HOSP IP/OBS MODERATE 35: CPT | Performed by: INTERNAL MEDICINE

## 2023-10-25 PROCEDURE — 94660 CPAP INITIATION&MGMT: CPT

## 2023-10-25 PROCEDURE — 2580000003 HC RX 258: Performed by: FAMILY MEDICINE

## 2023-10-25 PROCEDURE — 6370000000 HC RX 637 (ALT 250 FOR IP): Performed by: FAMILY MEDICINE

## 2023-10-25 PROCEDURE — 1200000000 HC SEMI PRIVATE

## 2023-10-25 PROCEDURE — 2580000003 HC RX 258: Performed by: INTERNAL MEDICINE

## 2023-10-25 RX ADMIN — IPRATROPIUM BROMIDE AND ALBUTEROL SULFATE 1 DOSE: .5; 2.5 SOLUTION RESPIRATORY (INHALATION) at 18:34

## 2023-10-25 RX ADMIN — ENOXAPARIN SODIUM 30 MG: 100 INJECTION SUBCUTANEOUS at 10:48

## 2023-10-25 RX ADMIN — GUAIFENESIN 600 MG: 600 TABLET, EXTENDED RELEASE ORAL at 19:57

## 2023-10-25 RX ADMIN — WATER 1000 MG: 1 INJECTION INTRAMUSCULAR; INTRAVENOUS; SUBCUTANEOUS at 15:08

## 2023-10-25 RX ADMIN — ARFORMOTEROL TARTRATE 15 MCG: 15 SOLUTION RESPIRATORY (INHALATION) at 18:33

## 2023-10-25 RX ADMIN — IPRATROPIUM BROMIDE AND ALBUTEROL SULFATE 1 DOSE: .5; 2.5 SOLUTION RESPIRATORY (INHALATION) at 21:53

## 2023-10-25 RX ADMIN — Medication 10 ML: at 19:57

## 2023-10-25 RX ADMIN — BENZONATATE 100 MG: 100 CAPSULE ORAL at 10:46

## 2023-10-25 RX ADMIN — Medication 10 ML: at 10:48

## 2023-10-25 RX ADMIN — DULOXETINE HYDROCHLORIDE 20 MG: 20 CAPSULE, DELAYED RELEASE ORAL at 10:46

## 2023-10-25 RX ADMIN — IPRATROPIUM BROMIDE AND ALBUTEROL SULFATE 1 DOSE: .5; 2.5 SOLUTION RESPIRATORY (INHALATION) at 10:02

## 2023-10-25 RX ADMIN — IPRATROPIUM BROMIDE AND ALBUTEROL SULFATE 1 DOSE: .5; 2.5 SOLUTION RESPIRATORY (INHALATION) at 02:00

## 2023-10-25 RX ADMIN — IPRATROPIUM BROMIDE AND ALBUTEROL SULFATE 1 DOSE: .5; 2.5 SOLUTION RESPIRATORY (INHALATION) at 06:25

## 2023-10-25 RX ADMIN — BUDESONIDE INHALATION 500 MCG: 0.5 SUSPENSION RESPIRATORY (INHALATION) at 18:34

## 2023-10-25 RX ADMIN — BUPRENORPHINE HYDROCHLORIDE AND NALOXONE HYDROCHLORIDE DIHYDRATE 1 TABLET: 8; 2 TABLET SUBLINGUAL at 14:54

## 2023-10-25 RX ADMIN — BUPRENORPHINE HYDROCHLORIDE AND NALOXONE HYDROCHLORIDE DIHYDRATE 1 TABLET: 8; 2 TABLET SUBLINGUAL at 19:57

## 2023-10-25 RX ADMIN — ATORVASTATIN CALCIUM 40 MG: 40 TABLET, FILM COATED ORAL at 19:57

## 2023-10-25 RX ADMIN — ONDANSETRON 4 MG: 4 TABLET, ORALLY DISINTEGRATING ORAL at 06:53

## 2023-10-25 RX ADMIN — ACETAMINOPHEN 650 MG: 325 TABLET ORAL at 06:53

## 2023-10-25 RX ADMIN — GUAIFENESIN 600 MG: 600 TABLET, EXTENDED RELEASE ORAL at 10:45

## 2023-10-25 RX ADMIN — AZITHROMYCIN DIHYDRATE 250 MG: 250 TABLET ORAL at 10:47

## 2023-10-25 RX ADMIN — ASPIRIN 81 MG CHEWABLE TABLET 81 MG: 81 TABLET CHEWABLE at 10:46

## 2023-10-25 RX ADMIN — IPRATROPIUM BROMIDE AND ALBUTEROL SULFATE 1 DOSE: .5; 2.5 SOLUTION RESPIRATORY (INHALATION) at 14:15

## 2023-10-25 RX ADMIN — BUDESONIDE INHALATION 500 MCG: 0.5 SUSPENSION RESPIRATORY (INHALATION) at 06:26

## 2023-10-25 RX ADMIN — TRAZODONE HYDROCHLORIDE 50 MG: 50 TABLET ORAL at 19:57

## 2023-10-25 RX ADMIN — ACETAMINOPHEN 650 MG: 325 TABLET ORAL at 14:54

## 2023-10-25 RX ADMIN — ARFORMOTEROL TARTRATE 15 MCG: 15 SOLUTION RESPIRATORY (INHALATION) at 06:25

## 2023-10-25 RX ADMIN — PREDNISONE 40 MG: 20 TABLET ORAL at 10:47

## 2023-10-25 RX ADMIN — BUPRENORPHINE HYDROCHLORIDE AND NALOXONE HYDROCHLORIDE DIHYDRATE 1 TABLET: 8; 2 TABLET SUBLINGUAL at 10:46

## 2023-10-25 ASSESSMENT — PAIN SCALES - GENERAL
PAINLEVEL_OUTOF10: 7
PAINLEVEL_OUTOF10: 9

## 2023-10-25 ASSESSMENT — PAIN DESCRIPTION - ORIENTATION
ORIENTATION: LEFT;ANTERIOR
ORIENTATION: LEFT;ANTERIOR

## 2023-10-25 ASSESSMENT — PAIN DESCRIPTION - LOCATION
LOCATION: HEAD
LOCATION: HEAD

## 2023-10-25 ASSESSMENT — PAIN DESCRIPTION - DESCRIPTORS
DESCRIPTORS: ACHING
DESCRIPTORS: THROBBING

## 2023-10-25 NOTE — PROGRESS NOTES
Pulmonary/Critical Care Progress Note    We are following patient for acute hypoxemic respiratory failure, severe COPD with exacerbation, bilateral pneumonia, improving mucociliary clearance, nicotine addiction    SUBJECTIVE:  The patient is clearly feeling better and stronger. She is eating all of her meals and has been very resolute in her desire to quit smoking completely. We will begin to taper her steroids and continue IV antibiotics with chest x-ray for tomorrow. I would think that she is a candidate for discharge within the next 24 to 48 hours. MEDICATIONS:   enoxaparin  30 mg SubCUTAneous Daily    ipratropium 0.5 mg-albuterol 2.5 mg  1 Dose Inhalation Q4H RT    azithromycin  250 mg Oral Daily    cefTRIAXone (ROCEPHIN) IV  1,000 mg IntraVENous Q24H    predniSONE  40 mg Oral Daily    traZODone  50 mg Oral Nightly    polyethylene glycol  17 g Oral Daily    sodium chloride flush  5-40 mL IntraVENous 2 times per day    guaiFENesin  600 mg Oral BID    arformoterol tartrate  15 mcg Nebulization BID RT    budesonide  0.5 mg Nebulization BID RT    aspirin  81 mg Oral Daily    atorvastatin  40 mg Oral Nightly    buprenorphine-naloxone  1 tablet SubLINGual TID    DULoxetine  20 mg Oral Daily      sodium chloride       perflutren lipid microspheres, sodium chloride flush, sodium chloride, acetaminophen **OR** acetaminophen, ondansetron **OR** ondansetron, benzonatate, ipratropium 0.5 mg-albuterol 2.5 mg      REVIEW OF SYSTEMS:  Constitutional: Denies fever, weight loss, night sweats, and fatigue  Skin: Denies pigmentation, dark lesions, and rashes   HEENT: Denies hearing loss, tinnitus, ear drainage, epistaxis, sore throat, and hoarseness. Cardiovascular: Denies palpitations, chest pain, and chest pressure. Respiratory: Denies cough, dyspnea at rest, hemoptysis, apnea, and choking.   Gastrointestinal: Denies nausea, vomiting, poor appetite, diarrhea, heartburn or reflux  Genitourinary: Denies dysuria,

## 2023-10-25 NOTE — CONSULTS
Comprehensive Nutrition Assessment    Type and Reason for Visit:  Initial, Positive Nutrition Screen, Consult (Poor Intake/Appetite 5 or more days)    Nutrition Recommendations/Plan:   Continue current diet   Recommend continue ONS Ensure Plus HP (or equivalent Ensure Enlive) BID   Recommend Magic Cup Daily         Malnutrition Assessment:  Malnutrition Status:  Severe malnutrition (10/25/23 1223)    Context:  Acute Illness     Findings of the 6 clinical characteristics of malnutrition:  Energy Intake:  75% or less of estimated energy requirements for 7 or more days  Weight Loss:  Unable to assess (d/t lack of accurate wt hx to trend)     Body Fat Loss: Moderate body fat loss Triceps, Orbital, Buccal region   Muscle Mass Loss: Moderate muscle mass loss Temples (temporalis), Clavicles (pectoralis & deltoids), Hand (interosseous)  Fluid Accumulation:  No significant fluid accumulation     Strength:  Not Performed    Nutrition Assessment:    Pt admit w/ COPD exacerbation d/t rhinovirus. PMHx of Anxiety, CAD, COPD, Depression, HTN, HLD, & Pancreatitis. Pt meets criteria for severe malnutrition. Consult for poor PO intake, pt is on High Dominik/ High Pro ONS BID. recommend continue w/ ONS, will add Magic cup daily. Continue to monitor while inpatient. Nutrition Related Findings:    abd soft, NT, ND, active BSx4, A&Ox4, missing teeth, decreased appetite, I/O's WDL Wound Type: None       Current Nutrition Intake & Therapies:    Average Meal Intake: 1-25%, 26-50%  Average Supplements Intake: Unable to assess (limted intake data)  ADULT DIET;  Regular  ADULT ORAL NUTRITION SUPPLEMENT; Breakfast, Lunch; Standard High Calorie/High Protein Oral Supplement  ADULT ORAL NUTRITION SUPPLEMENT; Dinner; Frozen Oral Supplement    Anthropometric Measures:  Height: 165.1 cm (5' 5\")  Ideal Body Weight (IBW): 125 lbs (57 kg)    Admission Body Weight: 44 kg (97 lb) (10/21)  Current Body Weight: 44 kg (97 lb) (10/21 not specified, unable to obtain BS wt at this time d/t droplet isolation status), 77.6 % IBW. Weight Source: Bed Scale  Current BMI (kg/m2): 16.1  Usual Body Weight:  (lack of accurate wt hx to trend, states she has always been small - noted wt 3/2022 113 lbs 13 oz)  Weight Adjustment For: No Adjustment  BMI Categories: Underweight (BMI less than 22) age over 72    Estimated Daily Nutrient Needs:  Energy Requirements Based On: Kcal/kg  Weight Used for Energy Requirements: Current  Energy (kcal/day): 1500-1700kcal/day (35-38kcal/kg CBW)  Weight Used for Protein Requirements: Current  Protein (g/day): 70-80g/day (1.5-1.8g/kg CBW)  Method Used for Fluid Requirements: 1 ml/kcal  Fluid (ml/day): 1500-1700ml/day    Nutrition Diagnosis:   Severe malnutrition, In context of acute illness or injury related to inadequate protein-energy intake as evidenced by Criteria as identified in malnutrition assessment    Nutrition Interventions:   Food and/or Nutrient Delivery: Continue Current Diet, Start Oral Nutrition Supplement (Recommend ONS Ensure Plus HP (or equivalent Ensure Enlive) BID)  Nutrition Education/Counseling: No recommendation at this time  Coordination of Nutrition Care: Continue to monitor while inpatient     Goals:  Goals: PO intake 75% or greater, by next RD assessment    Nutrition Monitoring and Evaluation:   Behavioral-Environmental Outcomes: None Identified  Food/Nutrient Intake Outcomes: Food and Nutrient Intake, Supplement Intake  Physical Signs/Symptoms Outcomes: Biochemical Data, Chewing or Swallowing, GI Status, Fluid Status or Edema, Nutrition Focused Physical Findings, Skin, Weight    Discharge Planning:     Too soon to determine     Edita Ortiz RD  Contact:

## 2023-10-25 NOTE — DISCHARGE INSTRUCTIONS
Follow up with  you primary care physician during the next 7 days after discharge  Ask your doctor about scheduling you for follow-up CAT scan of your chest in 3 to 6 months to double check a lung nodule  [facility] will start care Sat 10/28/23  Home care will call to schedule appointment time          Your information:  Name: Jeannette Plummer  : 1950    Your instructions:    IF YOU EXPERIENCE ANY OF THE FOLLOWING SYMPTOMS,SHORTNESS OF BREATH, TROUBLE BREATHING, CHEST PAIN OR PRESSURE, COUGH, COUGH WITH THICK, YELLOW SPUTUM, GENERAL NOT FEELING WELL, SIGNS AND SYMPTOMS OF INFECTION LIKE FEVER AND OR CHILLS, PLEASE CALL THE DOCTOR OR RETURN TO THE EMERGENCY ROOM     What to do after you leave the hospital:    Recommended diet: regular diet    Recommended activity: activity as tolerated        The following personal items were collected during your admission and were returned to you:    Belongings  Dental Appliances: None  Vision - Corrective Lenses: None  Hearing Aid: None  Clothing: Socks, Pants, Shirt  Jewelry: Bracelet, Earrings, Necklace, Ring  Electronic Devices: None  Weapons (Notify Protective Services/Security): None  Home Medications: None  Valuables Given To: Patient  Provide Name(s) of Who Valuable(s) Were Given To: PATIENT    Information obtained by:  By signing below, I understand that if any problems occur once I leave the hospital I am to contact Dr. Jignesh Valdes. I understand and acknowledge receipt of the instructions indicated above.

## 2023-10-25 NOTE — DISCHARGE INSTR - COC
Feedings:850421220}  Liquids: {Slp liquid thickness:35168}  Daily Fluid Restriction: {CHP DME Yes amt example:612678218}  Last Modified Barium Swallow with Video (Video Swallowing Test): {Done Not Done TMIO:251143232}    Treatments at the Time of Hospital Discharge:   Respiratory Treatments: ***  Oxygen Therapy:  {Therapy; copd oxygen:82918}  Ventilator:    {[facility] Vent TKSH:104382156}    Rehab Therapies: {THERAPEUTIC INTERVENTION:5659410535}  Weight Bearing Status/Restrictions: { CC Weight Bearin}  Other Medical Equipment (for information only, NOT a DME order):  {EQUIPMENT:916960648}  Other Treatments: ***    Patient's personal belongings (please select all that are sent with patient):  {P DME Belongings:853818917}    RN SIGNATURE:  {Esignature:698174411}    CASE MANAGEMENT/SOCIAL WORK SECTION    Inpatient Status Date: ***    Readmission Risk Assessment Score:  Readmission Risk              Risk of Unplanned Readmission:  16           Discharging to Facility/ Agency   Name:   Address:  Phone:  Fax:    Dialysis Facility (if applicable)   Name:  Address:  Dialysis Schedule:  Phone:  Fax:    / signature: {Esignature:334711441}    PHYSICIAN SECTION    Prognosis: {Prognosis:1965228177}    Condition at Discharge: [address] Patient Condition:889791607}    Rehab Potential (if transferring to Rehab): {Prognosis:8477463916}    Recommended Labs or Other Treatments After Discharge: ***    Physician Certification: I certify the above information and transfer of [place]  is necessary for the continuing treatment of the diagnosis listed and that she requires {Admit to Appropriate Level of Care:88147} for {GREATER/LESS:025636866} 30 days.      Update Admission H&P: {CHP DME Changes in UKPLM:106793078}    PHYSICIAN SIGNATURE:  {Esignature:213195057}

## 2023-10-25 NOTE — CARE COORDINATION
10/25/2023 1021 CM note:Pt resides with her granddtr Ki Ovalles  and their 6 children in a 2 story home, pt resides on 1st level. She  has home [email or web address]-portable and 10L cocentrator from [facility]. Pt is on suboxone and obtains med from [facility] every 2 weeks. Pt plans to return home at d/c and family will provide ride. Pt is agreeable for [facility], no agency preference. Referral placed to [address] [place] [address] at [facility]. [facility] accepted pt, received orders and will start care Sat 10/28/23. Jenifer LIZ    The Plan for Transition of Care is related to the following treatment goals: C    The Patient  was provided with a choice of provider and agrees   with the discharge plan. [x] Yes [] No    Freedom of choice list was provided with basic dialogue that supports the patient's individualized plan of care/goals, treatment preferences and shares the quality data associated with the providers.  [x] Yes [] No

## 2023-10-26 ENCOUNTER — APPOINTMENT (OUTPATIENT)
Dept: GENERAL RADIOLOGY | Age: 73
End: 2023-10-26
Payer: COMMERCIAL

## 2023-10-26 PROCEDURE — 6370000000 HC RX 637 (ALT 250 FOR IP): Performed by: INTERNAL MEDICINE

## 2023-10-26 PROCEDURE — 1200000000 HC SEMI PRIVATE

## 2023-10-26 PROCEDURE — 71045 X-RAY EXAM CHEST 1 VIEW: CPT

## 2023-10-26 PROCEDURE — 94660 CPAP INITIATION&MGMT: CPT

## 2023-10-26 PROCEDURE — 6360000002 HC RX W HCPCS: Performed by: FAMILY MEDICINE

## 2023-10-26 PROCEDURE — 97530 THERAPEUTIC ACTIVITIES: CPT

## 2023-10-26 PROCEDURE — 6360000002 HC RX W HCPCS: Performed by: INTERNAL MEDICINE

## 2023-10-26 PROCEDURE — 94669 MECHANICAL CHEST WALL OSCILL: CPT

## 2023-10-26 PROCEDURE — 97530 THERAPEUTIC ACTIVITIES: CPT | Performed by: PHYSICAL THERAPIST

## 2023-10-26 PROCEDURE — 6370000000 HC RX 637 (ALT 250 FOR IP): Performed by: FAMILY MEDICINE

## 2023-10-26 PROCEDURE — 2700000000 HC OXYGEN THERAPY PER DAY

## 2023-10-26 PROCEDURE — 97535 SELF CARE MNGMENT TRAINING: CPT

## 2023-10-26 PROCEDURE — 2580000003 HC RX 258: Performed by: INTERNAL MEDICINE

## 2023-10-26 PROCEDURE — 99231 SBSQ HOSP IP/OBS SF/LOW 25: CPT | Performed by: INTERNAL MEDICINE

## 2023-10-26 PROCEDURE — 2580000003 HC RX 258: Performed by: FAMILY MEDICINE

## 2023-10-26 PROCEDURE — 94640 AIRWAY INHALATION TREATMENT: CPT

## 2023-10-26 PROCEDURE — 99232 SBSQ HOSP IP/OBS MODERATE 35: CPT | Performed by: INTERNAL MEDICINE

## 2023-10-26 PROCEDURE — 97110 THERAPEUTIC EXERCISES: CPT | Performed by: PHYSICAL THERAPIST

## 2023-10-26 RX ORDER — ONDANSETRON 2 MG/ML
4 INJECTION INTRAMUSCULAR; INTRAVENOUS EVERY 6 HOURS
Status: DISCONTINUED | OUTPATIENT
Start: 2023-10-26 | End: 2023-10-26

## 2023-10-26 RX ORDER — ACETAMINOPHEN 500 MG
1000 TABLET ORAL EVERY 8 HOURS PRN
Status: DISCONTINUED | OUTPATIENT
Start: 2023-10-26 | End: 2023-10-27 | Stop reason: HOSPADM

## 2023-10-26 RX ORDER — ACETAMINOPHEN 650 MG/1
650 SUPPOSITORY RECTAL EVERY 6 HOURS PRN
Status: DISCONTINUED | OUTPATIENT
Start: 2023-10-26 | End: 2023-10-27 | Stop reason: HOSPADM

## 2023-10-26 RX ORDER — ONDANSETRON 4 MG/1
4 TABLET, ORALLY DISINTEGRATING ORAL EVERY 6 HOURS
Status: DISCONTINUED | OUTPATIENT
Start: 2023-10-26 | End: 2023-10-26

## 2023-10-26 RX ORDER — ONDANSETRON 4 MG/1
4 TABLET, ORALLY DISINTEGRATING ORAL EVERY 8 HOURS PRN
Status: DISCONTINUED | OUTPATIENT
Start: 2023-10-26 | End: 2023-10-26

## 2023-10-26 RX ORDER — PREDNISONE 10 MG/1
10 TABLET ORAL 2 TIMES DAILY
Status: DISCONTINUED | OUTPATIENT
Start: 2023-10-26 | End: 2023-10-27 | Stop reason: HOSPADM

## 2023-10-26 RX ORDER — PANTOPRAZOLE SODIUM 40 MG/1
40 TABLET, DELAYED RELEASE ORAL
Status: DISCONTINUED | OUTPATIENT
Start: 2023-10-27 | End: 2023-10-27 | Stop reason: HOSPADM

## 2023-10-26 RX ORDER — ONDANSETRON 4 MG/1
4 TABLET, ORALLY DISINTEGRATING ORAL EVERY 4 HOURS PRN
Status: DISCONTINUED | OUTPATIENT
Start: 2023-10-26 | End: 2023-10-27 | Stop reason: HOSPADM

## 2023-10-26 RX ORDER — ONDANSETRON 2 MG/ML
4 INJECTION INTRAMUSCULAR; INTRAVENOUS EVERY 4 HOURS PRN
Status: DISCONTINUED | OUTPATIENT
Start: 2023-10-26 | End: 2023-10-27 | Stop reason: HOSPADM

## 2023-10-26 RX ORDER — ONDANSETRON 2 MG/ML
4 INJECTION INTRAMUSCULAR; INTRAVENOUS EVERY 4 HOURS PRN
Status: DISCONTINUED | OUTPATIENT
Start: 2023-10-26 | End: 2023-10-26

## 2023-10-26 RX ADMIN — DULOXETINE HYDROCHLORIDE 20 MG: 20 CAPSULE, DELAYED RELEASE ORAL at 10:12

## 2023-10-26 RX ADMIN — BUPRENORPHINE HYDROCHLORIDE AND NALOXONE HYDROCHLORIDE DIHYDRATE 1 TABLET: 8; 2 TABLET SUBLINGUAL at 14:36

## 2023-10-26 RX ADMIN — IPRATROPIUM BROMIDE AND ALBUTEROL SULFATE 1 DOSE: .5; 2.5 SOLUTION RESPIRATORY (INHALATION) at 17:49

## 2023-10-26 RX ADMIN — PREDNISONE 40 MG: 20 TABLET ORAL at 10:12

## 2023-10-26 RX ADMIN — WATER 1000 MG: 1 INJECTION INTRAMUSCULAR; INTRAVENOUS; SUBCUTANEOUS at 14:36

## 2023-10-26 RX ADMIN — PREDNISONE 10 MG: 10 TABLET ORAL at 19:33

## 2023-10-26 RX ADMIN — GUAIFENESIN 600 MG: 600 TABLET, EXTENDED RELEASE ORAL at 10:12

## 2023-10-26 RX ADMIN — AZITHROMYCIN DIHYDRATE 250 MG: 250 TABLET ORAL at 10:12

## 2023-10-26 RX ADMIN — IPRATROPIUM BROMIDE AND ALBUTEROL SULFATE 1 DOSE: .5; 2.5 SOLUTION RESPIRATORY (INHALATION) at 14:23

## 2023-10-26 RX ADMIN — TRAZODONE HYDROCHLORIDE 50 MG: 50 TABLET ORAL at 19:33

## 2023-10-26 RX ADMIN — ONDANSETRON 4 MG: 2 INJECTION INTRAMUSCULAR; INTRAVENOUS at 07:50

## 2023-10-26 RX ADMIN — ATORVASTATIN CALCIUM 40 MG: 40 TABLET, FILM COATED ORAL at 19:33

## 2023-10-26 RX ADMIN — IPRATROPIUM BROMIDE AND ALBUTEROL SULFATE 1 DOSE: .5; 2.5 SOLUTION RESPIRATORY (INHALATION) at 10:11

## 2023-10-26 RX ADMIN — IPRATROPIUM BROMIDE AND ALBUTEROL SULFATE 1 DOSE: .5; 2.5 SOLUTION RESPIRATORY (INHALATION) at 05:01

## 2023-10-26 RX ADMIN — ACETAMINOPHEN 1000 MG: 500 TABLET ORAL at 21:11

## 2023-10-26 RX ADMIN — Medication 10 ML: at 19:33

## 2023-10-26 RX ADMIN — GUAIFENESIN 600 MG: 600 TABLET, EXTENDED RELEASE ORAL at 19:33

## 2023-10-26 RX ADMIN — BUPRENORPHINE HYDROCHLORIDE AND NALOXONE HYDROCHLORIDE DIHYDRATE 1 TABLET: 8; 2 TABLET SUBLINGUAL at 10:12

## 2023-10-26 RX ADMIN — ENOXAPARIN SODIUM 30 MG: 100 INJECTION SUBCUTANEOUS at 10:12

## 2023-10-26 RX ADMIN — ASPIRIN 81 MG CHEWABLE TABLET 81 MG: 81 TABLET CHEWABLE at 10:12

## 2023-10-26 RX ADMIN — Medication 10 ML: at 10:14

## 2023-10-26 RX ADMIN — ACETAMINOPHEN 650 MG: 325 TABLET ORAL at 07:50

## 2023-10-26 RX ADMIN — BUDESONIDE INHALATION 500 MCG: 0.5 SUSPENSION RESPIRATORY (INHALATION) at 05:01

## 2023-10-26 RX ADMIN — BENZONATATE 100 MG: 100 CAPSULE ORAL at 10:12

## 2023-10-26 RX ADMIN — ARFORMOTEROL TARTRATE 15 MCG: 15 SOLUTION RESPIRATORY (INHALATION) at 17:49

## 2023-10-26 RX ADMIN — ONDANSETRON 4 MG: 2 INJECTION INTRAMUSCULAR; INTRAVENOUS at 21:11

## 2023-10-26 RX ADMIN — ACETAMINOPHEN 1000 MG: 500 TABLET ORAL at 12:23

## 2023-10-26 RX ADMIN — BUPRENORPHINE HYDROCHLORIDE AND NALOXONE HYDROCHLORIDE DIHYDRATE 1 TABLET: 8; 2 TABLET SUBLINGUAL at 19:33

## 2023-10-26 RX ADMIN — ONDANSETRON 4 MG: 2 INJECTION INTRAMUSCULAR; INTRAVENOUS at 12:24

## 2023-10-26 RX ADMIN — ARFORMOTEROL TARTRATE 15 MCG: 15 SOLUTION RESPIRATORY (INHALATION) at 05:01

## 2023-10-26 RX ADMIN — BUDESONIDE INHALATION 500 MCG: 0.5 SUSPENSION RESPIRATORY (INHALATION) at 17:49

## 2023-10-26 RX ADMIN — IPRATROPIUM BROMIDE AND ALBUTEROL SULFATE 1 DOSE: .5; 2.5 SOLUTION RESPIRATORY (INHALATION) at 22:04

## 2023-10-26 RX ADMIN — ONDANSETRON 4 MG: 2 INJECTION INTRAMUSCULAR; INTRAVENOUS at 16:47

## 2023-10-26 ASSESSMENT — PAIN DESCRIPTION - ORIENTATION
ORIENTATION: LEFT
ORIENTATION: LEFT;ANTERIOR
ORIENTATION: MID

## 2023-10-26 ASSESSMENT — PAIN DESCRIPTION - LOCATION
LOCATION: HEAD

## 2023-10-26 ASSESSMENT — PAIN SCALES - GENERAL
PAINLEVEL_OUTOF10: 10
PAINLEVEL_OUTOF10: 10

## 2023-10-26 ASSESSMENT — PAIN DESCRIPTION - DESCRIPTORS
DESCRIPTORS: THROBBING
DESCRIPTORS: THROBBING
DESCRIPTORS: ACHING;DISCOMFORT;DULL

## 2023-10-26 NOTE — PLAN OF CARE
Problem: Safety - Adult  Goal: Free from fall injury  Outcome: Progressing     Problem: Respiratory - Adult  Goal: Achieves optimal ventilation and oxygenation  Outcome: Progressing     Problem: Gastrointestinal - Adult  Goal: Minimal or absence of nausea and vomiting  Outcome: Progressing     Problem: Pain  Goal: Verbalizes/displays adequate comfort level or baseline comfort level  Outcome: Progressing     Problem: Skin/Tissue Integrity  Goal: Absence of new skin breakdown  Description: 1. Monitor for areas of redness and/or skin breakdown  2. Assess vascular access sites hourly  3. Every 4-6 hours minimum:  Change oxygen saturation probe site  4. Every 4-6 hours:  If on nasal continuous positive airway pressure, respiratory therapy assess nares and determine need for appliance change or resting period.   Outcome: Progressing     Problem: Nutrition Deficit:  Goal: Optimize nutritional status  Outcome: Progressing

## 2023-10-26 NOTE — PROGRESS NOTES
Activity - Inpatient   How much help is needed for putting on and taking off regular lower body clothing?: A Little  How much help is needed for bathing (which includes washing, rinsing, drying)?: A Little  How much help is needed for toileting (which includes using toilet, bedpan, or urinal)?: A Little  How much help is needed for putting on and taking off regular upper body clothing?: A Little  How much help is needed for taking care of personal grooming?: A Little  How much help for eating meals?: None  AM-PAC Inpatient Daily Activity Raw Score: 19  AM-PAC Inpatient ADL T-Scale Score : 40.22  ADL Inpatient CMS 0-100% Score: 42.8  ADL Inpatient CMS G-Code Modifier : CK                Functional Assessment:     Initial Eval Status  Date: 10/24/23 Treatment Status  Date: 10/26/23 STGs = LTGs  Time frame: 10-14 days   Feeding Independent  Independent  Independent    Grooming Supervision  Supervision pt standing at sink to wash hands, face  Independent    UB Dressing Supervision  MIN A to [facility] gown requiring assist to tie/adjust in back  Independent    LB Dressing Supervision  Supervision to doc/doff brief/pants v/c's for safety  Independent    Bathing Supervision  supervision to complete sponge bathing standing/seated at sink  [address] for hygiene and to wash hands at sink level  Supervision completed all aspects of toileting  Independent    Bed Mobility  Supine to sit: Supervision   Sit to supine: N/T as pt was up in chair   Rolling:N/T    Supine<>sit supervision  Supine to sit: Independent   Sit to supine: Independent   Rolling:Independent     Functional Transfers Supervision from EOB. Supervision for transfer to and from low commode with Minimal verbal cues to use grab bar for safe commode transfer. Supervision for transfer from standing to bedside chair. Transfer training with verbal cues for hand placement throughout session to improve safety.   Supervision sit<>stand from EOB and standard toilet  Independent    Functional Mobility Supervision with no device to and from bathroom, verbal cues for long O2 line management, sequence and safety. Supervision short house hold distances in room no AD   Independent    Balance Sitting:     Static: good     Dynamic: fair   Standing: fair  with no device  Sitting:   Static: supervision   Dynamic: supervision   Standing: supervision   Sitting:     Static: good     Dynamic: good   Standing: good  with no device    Activity Tolerance Fair   fair good    Visual/  Perceptual Glasses: yes                           Comments: Upon arrival pt supine in bed, agreeable to therapy session. Pt educated with regards to bed mobility, functional transfers, functional mobility, safety awareness, LE/UE dressing, bathing, grooming tasks . At end of session pt supine in bed,  all lines and tubes intact, call light within reach. Pt has made fair  progress towards set goals.    Continue with current plan of care      Treatment Time In:1046            Treatment Time Out: 1110                Treatment Charges: Mins Units   Ther Ex  35015     Manual Therapy [id number]     Thera Activities 24183 10 1   ADL/Home Mgt 61485 14 1   Neuro Re-ed 57103     Group Therapy      Orthotic manage/training  40509     Non-Billable Time     Total Timed Treatment 24 [address] [facility] MATOS/L 14120

## 2023-10-26 NOTE — DISCHARGE SUMMARY
[facility] Physician Discharge Summary       No follow-up provider specified. Activity level: Slowly increase as tolerated    Diet: ADULT DIET; Regular  ADULT ORAL NUTRITION SUPPLEMENT; Breakfast, Lunch; Standard High Calorie/High Protein Oral Supplement  ADULT ORAL NUTRITION SUPPLEMENT; Dinner; Frozen Oral Supplement    Labs: None are pending at the discharge    Condition at discharge: Stable    Dispo: Return to home setting     Patient ID:  Kenney Fontana  [id number]  72 y.o.  1950    Admit date: 10/20/2023    Discharge date and time:  10/26/2023  8:35 AM    Admission Diagnoses: Principal Problem:    COPD exacerbation ([address])  Active Problems:    Chronic back pain    Coronary artery disease involving native heart without angina pectoris    Hyperlipidemia    Severe protein-calorie malnutrition ([address])    COPD with acute exacerbation ([address])  Resolved Problems:    * No resolved hospital problems. *      Discharge Diagnoses: Principal Problem:    COPD exacerbation ([address])  Active Problems:    Chronic back pain    Coronary artery disease involving native heart without angina pectoris    Hyperlipidemia    Severe protein-calorie malnutrition (HCC)    COPD with acute exacerbation (HCC)  Resolved Problems:    * No resolved hospital problems. *      Consults:  IP CONSULT TO CARDIOLOGY  IP CONSULT TO PULMONOLOGY  IP CONSULT TO DIETITIAN  IP CONSULT TO SOCIAL WORK    Procedures: None significant except if described in hospital course. Hospital Course: History of present illness from History and Physical:   72 y.o. female with a history of COPD, chronic hypoxic respiratory failure on 4L, HTN, HLD presents with SOB and URI symptoms for two days. Also has had nausea/vomiting for the past 2 days. Denies abdominal pain. No fever/chills. Workup in ED significant for CO2 30, glucose 119, troponin 23, WBC 16.1. ABG revealed pH 7.334, pCO2 28.3, pO2 91.1.  Flu/COVID negative.   CXR

## 2023-10-26 NOTE — PROGRESS NOTES
hand placement, technique and safety. Provide stabilization to prevent fall   -Gait: Gait training, Standing activities to improve: base of support, weight shift, weight bearing , and Pregait training to emphasize: Safety, O2 line management, and pacing   -Endurance: Utilize Supervised activities to increase level of endurance to allow for safe functional mobility including transfers and gait  and Use graduated activities to promote good breathing techniques and provide support and education to maximize respiratory function  -Stairs: Stair training with instruction on proper technique and hand placement on rail  -Instruction in independent management of O2 line    PT long term treatment goals are located in below grid    Patient and or family understand(s) diagnosis, prognosis, and plan of care. Frequency of treatments: Patient will be seen  daily. Prior Level of Function: Patient ambulated independently, drives    Rehab Potential: good   for baseline    Past medical history:   Past Medical History:   Diagnosis Date    Anxiety     Back pain, chronic     CAD (coronary artery disease)     Chronic obstructive pulmonary disease ([address]) 3/20/2022    Community acquired bacterial pneumonia 6/10/2023    Depression     History of cardiovascular stress test 1-    LEXISCAN STRESS    Hx of blood clots     Hyperlipemia     Hypertension     Pancreatitis      Past Surgical History:   Procedure Laterality Date    BACK SURGERY      CORONARY ANGIOPLASTY WITH STENT PLACEMENT      ECHO COMPL W DOP COLOR FLOW  1/10/2013         HAND SURGERY Right 10/12/2019    HAND INCISION AND DRAINAGE performed by Emma Ramos DO at [address] (CERVIX STATUS UNKNOWN)      TEMPOROMANDIBULAR JOINT SURGERY         SUBJECTIVE:    Precautions:  Up with assistance, falls, O2, and droplet isolation rhinovirus  ,      Imaging results: XR CHEST PORTABLE    Result Date: 10/24/2023  EXAMINATION: ONE XRAY VIEW OF THE CHEST 10/24/2023 Physical Therapy to improve functional independence and quality of life.          Patient's/ family goals   home    Time in     65  Time out   943    Total Treatment Time  26 minutes        CPT codes:  Therapeutic activities (35293)   16 minutes  1 unit(s)  Therapeutic exercises (76707)   10 minutes  1 unit(s)    Hoa Osuna, PT

## 2023-10-26 NOTE — PROGRESS NOTES
Pulmonary/Critical Care Progress Note    We are following patient for acute hypoxemic respiratory failure, COPD with exacerbation, pneumonia (clear) nicotine addiction    SUBJECTIVE:  Doing continues to improve. We will initiate a Medrol Dosepak and discontinue antibiotics since she is starting to have some diarrhea and nausea. We will add Protonix to her regimen. She is stable for discharge tomorrow from the pulmonary perspective. Chest x-ray images have been personally reviewed by me. MEDICATIONS:   enoxaparin  30 mg SubCUTAneous Daily    ipratropium 0.5 mg-albuterol 2.5 mg  1 Dose Inhalation Q4H RT    cefTRIAXone (ROCEPHIN) IV  1,000 mg IntraVENous Q24H    predniSONE  40 mg Oral Daily    traZODone  50 mg Oral Nightly    polyethylene glycol  17 g Oral Daily    sodium chloride flush  5-40 mL IntraVENous 2 times per day    guaiFENesin  600 mg Oral BID    arformoterol tartrate  15 mcg Nebulization BID RT    budesonide  0.5 mg Nebulization BID RT    aspirin  81 mg Oral Daily    atorvastatin  40 mg Oral Nightly    buprenorphine-naloxone  1 tablet SubLINGual TID    DULoxetine  20 mg Oral Daily      sodium chloride       acetaminophen **OR** acetaminophen, ondansetron **OR** ondansetron, perflutren lipid microspheres, sodium chloride flush, sodium chloride, benzonatate      REVIEW OF SYSTEMS:  Constitutional: Denies fever, weight loss, night sweats, and fatigue  Skin: Denies pigmentation, dark lesions, and rashes   HEENT: Denies hearing loss, tinnitus, ear drainage, epistaxis, sore throat, and hoarseness. Cardiovascular: Denies palpitations, chest pain, and chest pressure. Respiratory: Denies cough, dyspnea at rest, hemoptysis, apnea, and choking.   Gastrointestinal: Positive for nausea, negative for vomiting, poor appetite, complains of mild diarrhea, denies heartburn or reflux  Genitourinary: Denies dysuria, frequency, urgency or hematuria  Musculoskeletal: Denies myalgias, muscle weakness, and bone RECOMMENDATIONS:   Multiple pulmonary nodules. Most significant: 7 mm left solid pulmonary   nodule. Per Fleischner Society Guidelines, recommend a non-contrast Chest CT   at 3-6 months, then another non-contrast Chest CT at 18-24 months. These guidelines do not apply to immunocompromised patients and patients with   cancer. Follow up in patients with significant comorbidities as clinically   warranted. For lung cancer screening, adhere to Lung-RADS guidelines. Reference: Radiology. 2017; 284(1):228-43. XR CHEST PORTABLE   Final Result   No acute process. PROBLEM LIST:  Principal Problem:    COPD exacerbation ([address])  Active Problems:    Chronic back pain    Coronary artery disease involving native heart without angina pectoris    Hyperlipidemia    Severe protein-calorie malnutrition (HCC)    COPD with acute exacerbation (HCC)  Resolved Problems:    * No resolved hospital problems.  *      IMPRESSION:  Acute hypoxemic respiratory failure, improved  Bilateral pneumonia, improved  Nicotine addiction  History of Suboxone use  Nausea vomiting earlier this morning    PLAN:  Discontinue antibiotics  Add Protonix  Changed to oral steroids with meals  Okay for discharge tomorrow from pulmonary point of view        Electronically signed by Umair Juarez MD on 10/26/2023 at 2:47 PM

## 2023-10-27 VITALS
OXYGEN SATURATION: 95 % | TEMPERATURE: 97.6 F | HEART RATE: 70 BPM | RESPIRATION RATE: 12 BRPM | WEIGHT: 97 LBS | HEIGHT: 65 IN | BODY MASS INDEX: 16.16 KG/M2 | SYSTOLIC BLOOD PRESSURE: 105 MMHG | DIASTOLIC BLOOD PRESSURE: 51 MMHG

## 2023-10-27 PROCEDURE — 6370000000 HC RX 637 (ALT 250 FOR IP): Performed by: INTERNAL MEDICINE

## 2023-10-27 PROCEDURE — 6370000000 HC RX 637 (ALT 250 FOR IP): Performed by: FAMILY MEDICINE

## 2023-10-27 PROCEDURE — 99231 SBSQ HOSP IP/OBS SF/LOW 25: CPT | Performed by: INTERNAL MEDICINE

## 2023-10-27 PROCEDURE — 2580000003 HC RX 258: Performed by: FAMILY MEDICINE

## 2023-10-27 PROCEDURE — 97530 THERAPEUTIC ACTIVITIES: CPT | Performed by: PHYSICAL THERAPIST

## 2023-10-27 PROCEDURE — 94669 MECHANICAL CHEST WALL OSCILL: CPT

## 2023-10-27 PROCEDURE — 6360000002 HC RX W HCPCS: Performed by: INTERNAL MEDICINE

## 2023-10-27 PROCEDURE — 94640 AIRWAY INHALATION TREATMENT: CPT

## 2023-10-27 PROCEDURE — 99239 HOSP IP/OBS DSCHRG MGMT >30: CPT | Performed by: INTERNAL MEDICINE

## 2023-10-27 PROCEDURE — 94660 CPAP INITIATION&MGMT: CPT

## 2023-10-27 PROCEDURE — 6360000002 HC RX W HCPCS: Performed by: FAMILY MEDICINE

## 2023-10-27 PROCEDURE — 2700000000 HC OXYGEN THERAPY PER DAY

## 2023-10-27 RX ORDER — PREDNISONE 10 MG/1
TABLET ORAL
Qty: 9 TABLET | Refills: 0 | Status: SHIPPED | OUTPATIENT
Start: 2023-10-27

## 2023-10-27 RX ORDER — GUAIFENESIN 600 MG/1
600 TABLET, EXTENDED RELEASE ORAL 2 TIMES DAILY
Qty: 30 TABLET | Refills: 0 | Status: SHIPPED | OUTPATIENT
Start: 2023-10-27 | End: 2023-11-11

## 2023-10-27 RX ADMIN — BUDESONIDE INHALATION 500 MCG: 0.5 SUSPENSION RESPIRATORY (INHALATION) at 06:04

## 2023-10-27 RX ADMIN — ACETAMINOPHEN 1000 MG: 500 TABLET ORAL at 13:18

## 2023-10-27 RX ADMIN — ARFORMOTEROL TARTRATE 15 MCG: 15 SOLUTION RESPIRATORY (INHALATION) at 06:04

## 2023-10-27 RX ADMIN — IPRATROPIUM BROMIDE AND ALBUTEROL SULFATE 1 DOSE: .5; 2.5 SOLUTION RESPIRATORY (INHALATION) at 13:30

## 2023-10-27 RX ADMIN — ACETAMINOPHEN 1000 MG: 500 TABLET ORAL at 05:09

## 2023-10-27 RX ADMIN — GUAIFENESIN 600 MG: 600 TABLET, EXTENDED RELEASE ORAL at 09:18

## 2023-10-27 RX ADMIN — IPRATROPIUM BROMIDE AND ALBUTEROL SULFATE 1 DOSE: .5; 2.5 SOLUTION RESPIRATORY (INHALATION) at 06:04

## 2023-10-27 RX ADMIN — PREDNISONE 10 MG: 10 TABLET ORAL at 09:17

## 2023-10-27 RX ADMIN — BUPRENORPHINE HYDROCHLORIDE AND NALOXONE HYDROCHLORIDE DIHYDRATE 1 TABLET: 8; 2 TABLET SUBLINGUAL at 15:04

## 2023-10-27 RX ADMIN — BUPRENORPHINE HYDROCHLORIDE AND NALOXONE HYDROCHLORIDE DIHYDRATE 1 TABLET: 8; 2 TABLET SUBLINGUAL at 09:17

## 2023-10-27 RX ADMIN — DULOXETINE HYDROCHLORIDE 20 MG: 20 CAPSULE, DELAYED RELEASE ORAL at 09:18

## 2023-10-27 RX ADMIN — Medication 10 ML: at 09:17

## 2023-10-27 RX ADMIN — ENOXAPARIN SODIUM 30 MG: 100 INJECTION SUBCUTANEOUS at 09:18

## 2023-10-27 RX ADMIN — ONDANSETRON 4 MG: 2 INJECTION INTRAMUSCULAR; INTRAVENOUS at 09:21

## 2023-10-27 RX ADMIN — IPRATROPIUM BROMIDE AND ALBUTEROL SULFATE 1 DOSE: .5; 2.5 SOLUTION RESPIRATORY (INHALATION) at 09:43

## 2023-10-27 RX ADMIN — ASPIRIN 81 MG CHEWABLE TABLET 81 MG: 81 TABLET CHEWABLE at 09:18

## 2023-10-27 RX ADMIN — ONDANSETRON 4 MG: 2 INJECTION INTRAMUSCULAR; INTRAVENOUS at 02:55

## 2023-10-27 RX ADMIN — PANTOPRAZOLE SODIUM 40 MG: 40 TABLET, DELAYED RELEASE ORAL at 05:09

## 2023-10-27 ASSESSMENT — PAIN DESCRIPTION - LOCATION
LOCATION: HEAD
LOCATION: HEAD

## 2023-10-27 ASSESSMENT — PAIN DESCRIPTION - DESCRIPTORS: DESCRIPTORS: ACHING

## 2023-10-27 ASSESSMENT — PAIN SCALES - GENERAL
PAINLEVEL_OUTOF10: 10
PAINLEVEL_OUTOF10: 5

## 2023-10-27 ASSESSMENT — PAIN DESCRIPTION - ORIENTATION: ORIENTATION: RIGHT;LEFT;MID

## 2023-10-27 NOTE — DISCHARGE SUMMARY
Oral   Oral   SpO2:  93% 96% 95%   Weight:       Height:           No acute distress moist membranes   Normocephalic, without obvious abnormality, atraumatic, external ears without lesions,   Neck supple no cervical lymphadenopathy  Heart has a regular rate and rhythm no murmur  Lungs are clear to auscultation bilaterally with equal movements. Abdomen is soft nontender nondistended no rebound or guarding. No  significant peripheral edema good peripheral perfusion. No significant rashes or new skin lesions. No new focality on neuro exam which is overall grossly intact. Affect and mood seem to be appropriate     I/O last 3 completed shifts:  In: -   Out: 600 [Urine:600]  No intake/output data recorded. LABS:  No results for input(s): \"NA\", \"K\", \"CL\", \"CO2\", \"BUN\", \"CREATININE\", \"GLUCOSE\", \"CALCIUM\" in the last 72 hours. No results for input(s): \"WBC\", \"RBC\", \"HGB\", \"HCT\", \"MCV\", \"MCH\", \"MCHC\", \"RDW\", \"PLT\", \"MPV\" in the last 72 hours. No results for input(s): \"POCGLU\" in the last 72 hours. Imaging:  XR CHEST PORTABLE    Result Date: 10/20/2023  EXAMINATION: ONE XRAY VIEW OF THE CHEST 10/20/2023 4:30 pm COMPARISON: 06/06/2023 HISTORY: ORDERING SYSTEM PROVIDED HISTORY: cough, congestion TECHNOLOGIST PROVIDED HISTORY: Reason for exam:->cough, congestion FINDINGS: The lungs are without acute focal process. There is no effusion or pneumothorax. The cardiomediastinal silhouette is without acute process. The osseous structures are without acute process. No acute process.          Patient Instructions:   Current Discharge Medication List        START taking these medications    Details   guaiFENesin (MUCINEX) 600 MG extended release tablet Take 1 tablet by mouth 2 times daily for 30 doses  Qty: 30 tablet, Refills: 0      predniSONE (DELTASONE) 10 MG tablet 2 tabs daily x3 then 1 tab daily x3  Qty: 9 tablet, Refills: 0           CONTINUE these medications which have NOT CHANGED    Details   albuterol sulfate HFA (PROVENTIL;VENTOLIN;PROAIR) 108 (90 Base) MCG/ACT inhaler inhale 2 puffs by mouth and INTO THE LUNGS every 4 to 6 hours if needed      tiotropium (SPIRIVA HANDIHALER) 18 MCG inhalation capsule Inhale 1 capsule into the lungs daily  Qty: 90 capsule, Refills: 1    Associated Diagnoses: Chronic obstructive pulmonary disease, unspecified COPD type (HCC)      DULoxetine (CYMBALTA) 20 MG extended release capsule Take 1 capsule by mouth daily  Qty: 30 capsule, Refills: 3      atorvastatin (LIPITOR) 40 MG tablet Take 1 tablet by mouth nightly  Qty: 90 tablet, Refills: 1    Associated Diagnoses: Hyperlipidemia, unspecified hyperlipidemia type      aspirin 81 MG chewable tablet Take 1 tablet by mouth daily  Qty: 90 tablet, Refills: 3    Comments: Patient tolerates  Associated Diagnoses: Coronary artery disease involving native coronary artery of native heart without angina pectoris      albuterol-ipratropium (COMBIVENT RESPIMAT)  MCG/ACT AERS inhaler Inhale 1 puff into the lungs every 6 hours as needed for Wheezing or Shortness of Breath  Qty: 3 each, Refills: 3    Associated Diagnoses: Chronic obstructive pulmonary disease, unspecified COPD type (HCC)      buprenorphine-naloxone (SUBOXONE) 8-2 MG FILM SL film Place 1 Film under the tongue 3 times daily. Note that more than 30 minutes was spent in preparing discharge papers, discussing discharge with patient, medication review, etc.    NOTE: This report was transcribed using voice recognition software. Every effort was made to ensure accuracy; however, inadvertent computerized transcription errors may be present.      Signed:  Electronically signed by Gallo Carmen MD on 10/27/2023 at 11:01 AM

## 2023-10-27 NOTE — PROGRESS NOTES
Pulmonary/Critical Care Progress Note    We are following patient for acute hypoxemic respiratory failure, COPD exacerbation, pneumonia, nicotine addiction    SUBJECTIVE:  Patient continues to improve and is happy to be going home. She is tolerating her steroids and is waiting for her other prescriptions. Oxygenation is continue to improve. She is on 4 L/min and saturating at 95%. She probably requires less oxygen at rest but should probably be on 3 to 4 L with exertion. MEDICATIONS:   predniSONE  10 mg Oral BID    pantoprazole  40 mg Oral QAM AC    enoxaparin  30 mg SubCUTAneous Daily    ipratropium 0.5 mg-albuterol 2.5 mg  1 Dose Inhalation Q4H RT    traZODone  50 mg Oral Nightly    polyethylene glycol  17 g Oral Daily    sodium chloride flush  5-40 mL IntraVENous 2 times per day    guaiFENesin  600 mg Oral BID    arformoterol tartrate  15 mcg Nebulization BID RT    budesonide  0.5 mg Nebulization BID RT    aspirin  81 mg Oral Daily    atorvastatin  40 mg Oral Nightly    buprenorphine-naloxone  1 tablet SubLINGual TID    DULoxetine  20 mg Oral Daily      sodium chloride       acetaminophen **OR** acetaminophen, ondansetron **OR** ondansetron, perflutren lipid microspheres, sodium chloride flush, sodium chloride, benzonatate      REVIEW OF SYSTEMS:  Constitutional: Denies fever, weight loss, night sweats, and fatigue  Skin: Denies pigmentation, dark lesions, and rashes   HEENT: Denies hearing loss, tinnitus, ear drainage, epistaxis, sore throat, and hoarseness. Cardiovascular: Denies palpitations, chest pain, and chest pressure. Respiratory: Denies cough, dyspnea at rest, hemoptysis, apnea, and choking.   Gastrointestinal: Denies nausea, vomiting, poor appetite, diarrhea, heartburn or reflux  Genitourinary: Denies dysuria, frequency, urgency or hematuria  Musculoskeletal: Denies myalgias, muscle weakness, and bone pain  Neurological: Denies dizziness, vertigo, headache, and focal left solid pulmonary   nodule. Per Fleischner Society Guidelines, recommend a non-contrast Chest CT   at 3-6 months, then another non-contrast Chest CT at 18-24 months. These guidelines do not apply to immunocompromised patients and patients with   cancer. Follow up in patients with significant comorbidities as clinically   warranted. For lung cancer screening, adhere to Lung-RADS guidelines. Reference: Radiology. 2017; 284(1):228-43. XR CHEST PORTABLE   Final Result   No acute process. PROBLEM LIST:  Principal Problem:    COPD exacerbation ([address])  Active Problems:    Chronic back pain    Coronary artery disease involving native heart without angina pectoris    Hyperlipidemia    Severe protein-calorie malnutrition (HCC)    COPD with acute exacerbation (HCC)  Resolved Problems:    * No resolved hospital problems.  *      IMPRESSION:  Acute hypoxemic respiratory failure, improved further  Bilateral pneumonia, improved  Nicotine addiction    PLAN:  Okay for discharge from pulmonary standpoint  Taper steroids per instructions of attending physician  Patient should follow-up with outpatient pulmonology  Absolutely no smoking ever    Electronically signed by Tarah Gale MD on 10/27/2023 at 4:10 PM

## 2023-10-27 NOTE — PROGRESS NOTES
CLINICAL PHARMACY NOTE: MEDS TO BEDS    Total # of Prescriptions Filled: 2   The following medications were delivered to the patient:  Prednisone 10 mg  Mucus relief er 600 mg    Additional Documentation:  Delivered to desk

## 2023-10-27 NOTE — CARE COORDINATION
10/27/2023 0938 CM note: Pt resides with multiple family members in a 2 story home, pt resides on 1st level. She  has home [email or web address] [place]-portable and 10L cocentrator from [facility]. Pt is on suboxone and obtains med from [facility] every 2 weeks. [facility] accepted pt, received orders and will start care Sat 10/28/23. Pt plans to return home at d/c and family will provide ride. Jenifer LIZ

## 2023-10-27 NOTE — PROGRESS NOTES
Physical Therapy Treatment Note/Plan of Care    Room #:  2443/2565-04  Patient Name: Shaylee Alonso  [facility]"  YOB: 1950  MRN: [id number]    Date of Service: 10/27/2023     Tentative placement recommendation: Home with Home Health Physical Therapy  Equipment recommendation: To be determined      Evaluating Physical Therapist: Sydni Calero, PT #[id number]      Specific Provider Orders/Date/Referring Provider :  10/23/23 0845    PT eval and treat  Start:  10/23/23 0845,   End:  10/23/23 0845,   ONE TIME,   Standing Count:  1 Occurrences,   R         Phuong Bar MD     Admitting Diagnosis:   COPD exacerbation ([address]) [J44.1]  COPD with acute exacerbation ([address]) [J44.1]      shortness of breath   Surgery: none      Patient Active Problem List   Diagnosis    Chronic back pain    Depression    Coronary artery disease involving native heart without angina pectoris    Bronchiectasis without complication (HCC)    Hyperlipidemia    Chronic obstructive pulmonary disease (HCC)    Severe protein-calorie malnutrition (HCC)    COPD exacerbation ([address])    COPD with acute exacerbation ([address])        ASSESSMENT of Current Deficits Patient exhibits decreased strength, balance, and endurance impairing functional mobility, transfers, gait , gait distance, and tolerance to activity decreased O2 saturation with activity requiring rest breaks and cues purse lip breathing. Pt mildly unsteady with ambulation with no LOB, dizziness, with moderate SOB. Pt performed stairs with ModA and HHA during session limited by weakness and SOB. Patient requires skilled physical therapy to address concerns listed above to increase safety and independence at discharge.           PHYSICAL THERAPY  PLAN OF CARE       Physical therapy plan of care is established based on physician order,  patient diagnosis and clinical assessment    Current Treatment Recommendations:    -Transfers: Provide instruction on proper hand and foot position for adequate transfer of weight onto lower extremities and use of gait device if needed and Cues for hand placement, technique and safety. Provide stabilization to prevent fall   -Gait: Gait training, Standing activities to improve: base of support, weight shift, weight bearing , and Pregait training to emphasize: Safety, O2 line management, and pacing   -Endurance: Utilize Supervised activities to increase level of endurance to allow for safe functional mobility including transfers and gait  and Use graduated activities to promote good breathing techniques and provide support and education to maximize respiratory function  -Stairs: Stair training with instruction on proper technique and hand placement on rail  -Instruction in independent management of O2 line    PT long term treatment goals are located in below grid    Patient and or family understand(s) diagnosis, prognosis, and plan of care. Frequency of treatments: Patient will be seen  daily. Prior Level of Function: Patient ambulated independently, drives    Rehab Potential: good   for baseline    Past medical history:   Past Medical History:   Diagnosis Date    Anxiety     Back pain, chronic     CAD (coronary artery disease)     Chronic obstructive pulmonary disease ([address]) 3/20/2022    Community acquired bacterial pneumonia 6/10/2023    Depression     History of cardiovascular stress test 1-    LEXISCAN STRESS    Hx of blood clots     Hyperlipemia     Hypertension     Pancreatitis      Past Surgical History:   Procedure Laterality Date    BACK SURGERY      CORONARY ANGIOPLASTY WITH STENT PLACEMENT      ECHO COMPL W DOP COLOR FLOW  1/10/2013         HAND SURGERY Right 10/12/2019    HAND INCISION AND DRAINAGE performed by Francisco Khan DO at [address] (CERVIX STATUS UNKNOWN)      TEMPOROMANDIBULAR JOINT SURGERY         SUBJECTIVE:    Precautions:  Up with assistance, falls, O2, and droplet isolation rhinovirus  ,      Imaging results: XR CHEST

## 2023-10-31 ENCOUNTER — TELEPHONE (OUTPATIENT)
Dept: FAMILY MEDICINE CLINIC | Age: 73
End: 2023-10-31

## 2023-10-31 NOTE — TELEPHONE ENCOUNTER
[facility] is cancelling the referral. They called the  patient multiple times, mail box is full. They called the son previously he was to speak with his mother and get back to them. Melody hasn't heard from the son.

## 2023-11-10 ENCOUNTER — HOSPITAL ENCOUNTER (OUTPATIENT)
Age: 73
End: 2023-11-10
Payer: COMMERCIAL

## 2023-11-10 ENCOUNTER — HOSPITAL ENCOUNTER (OUTPATIENT)
Dept: GENERAL RADIOLOGY | Age: 73
End: 2023-11-10
Payer: COMMERCIAL

## 2023-11-10 DIAGNOSIS — R07.81 RIB PAIN ON LEFT SIDE: ICD-10-CM

## 2023-11-10 PROCEDURE — 71101 X-RAY EXAM UNILAT RIBS/CHEST: CPT

## 2023-12-24 NOTE — ED PROVIDER NOTES
Ammonia   Result Value Ref Range    Ammonia 63.0 (H) 11.0 - 51.0 umol/L   Hepatic Function Panel   Result Value Ref Range    Total Protein 7.0 6.4 - 8.3 g/dL    Albumin 3.5 3.5 - 5.2 g/dL    Alkaline Phosphatase 98 35 - 104 U/L    ALT 12 0 - 32 U/L    AST 13 0 - 31 U/L    Total Bilirubin 0.6 0.0 - 1.2 mg/dL    Bilirubin, Direct <0.2 0.0 - 0.3 mg/dL    Bilirubin, Indirect see below 0.0 - 1.0 mg/dL   Troponin   Result Value Ref Range    Troponin, High Sensitivity 6 0 - 9 ng/L   Beta-Hydroxybutyrate   Result Value Ref Range    Beta-Hydroxybutyrate 0.05 0.02 - 0.27 mmol/L   Acetaminophen Level   Result Value Ref Range    Acetaminophen Level <5.0 (L) 10.0 - 30.0 mcg/mL   Ethanol   Result Value Ref Range    Ethanol Lvl <98 mg/dL   Salicylate   Result Value Ref Range    Salicylate, Serum <6.2 0.0 - 30.0 mg/dL   Blood Gas, Arterial   Result Value Ref Range    Date Analyzed [id number]     Time Analyzed 1722     Source: Blood Arterial     pH, Blood Gas 7.374 7.350 - 7.450    PCO2 49.1 (H) 35.0 - 45.0 mmHg    PO2 92.5 75.0 - 100.0 mmHg    HCO3 28.0 (H) 22.0 - 26.0 mmol/L    B.E. 2.0 -3.0 - 3.0 mmol/L    O2 Sat 96.5 92.0 - 98.5 %    O2Hb 94.8 94.0 - 97.0 %    COHb 1.5 0.0 - 1.5 %    MetHb 0.3 0.0 - 1.5 %    O2 Content 17.8 mL/dL    HHb 3.4 0.0 - 5.0 %    tHb (est) 13.3 11.5 - 16.5 g/dL    Mode NC-  2L     Date Of Collection      Time Collected      Pt Temp 37.0 C     ID [id number]     Lab [id number]     Critical(s) Notified . No Critical Values    EKG 12 Lead   Result Value Ref Range    Ventricular Rate 109 BPM    Atrial Rate 109 BPM    P-R Interval 124 ms    QRS Duration 86 ms    Q-T Interval 326 ms    QTc Calculation (Bazett) 439 ms    P Axis 23 degrees    R Axis 69 degrees    T Axis 18 degrees       Radiology  CT CHEST WO CONTRAST   Final Result   Chest: Scattered patchy opacifications in the right lung with coalescent and   dense opacities right lower lobe of bronchopneumonia likely with intervening   air bronchograms.   Trace No

## 2024-02-02 ENCOUNTER — HOSPITAL ENCOUNTER (OUTPATIENT)
Dept: CT IMAGING | Age: 74
Discharge: HOME | End: 2024-02-02
Payer: COMMERCIAL

## 2024-02-02 DIAGNOSIS — R91.1 LUNG NODULE: ICD-10-CM

## 2024-02-02 PROCEDURE — 71250 CT THORAX DX C-: CPT

## 2024-02-15 ENCOUNTER — COMMUNITY OUTREACH (OUTPATIENT)
Dept: FAMILY MEDICINE CLINIC | Age: 74
End: 2024-02-15

## 2024-02-21 ENCOUNTER — TELEPHONE (OUTPATIENT)
Dept: FAMILY MEDICINE CLINIC | Age: 74
End: 2024-02-21

## 2024-02-21 NOTE — TELEPHONE ENCOUNTER
Received denial from [facility] for oxygen. Called patient to schedule appointment per Dr. Ayala, but mailbox full

## 2024-02-23 NOTE — TELEPHONE ENCOUNTER
I tried to call the patient and her voicemail is full and her granddaughter is listed on HIPAA and her # wont take calls. I looked back into her chart and found # for her son Elvin who is on her HIPAA form too. I left him a message so I will wait for him to call me back

## 2024-03-12 ENCOUNTER — OFFICE VISIT (OUTPATIENT)
Dept: FAMILY MEDICINE CLINIC | Age: 74
End: 2024-03-12
Payer: COMMERCIAL

## 2024-03-12 VITALS
BODY MASS INDEX: 17.66 KG/M2 | TEMPERATURE: 97 F | OXYGEN SATURATION: 97 % | WEIGHT: 106 LBS | HEART RATE: 68 BPM | RESPIRATION RATE: 16 BRPM | HEIGHT: 65 IN | SYSTOLIC BLOOD PRESSURE: 126 MMHG | DIASTOLIC BLOOD PRESSURE: 68 MMHG

## 2024-03-12 DIAGNOSIS — Z12.31 ENCOUNTER FOR SCREENING MAMMOGRAM FOR MALIGNANT NEOPLASM OF BREAST: ICD-10-CM

## 2024-03-12 DIAGNOSIS — J44.9 CHRONIC OBSTRUCTIVE PULMONARY DISEASE, UNSPECIFIED COPD TYPE (HCC): Primary | ICD-10-CM

## 2024-03-12 DIAGNOSIS — G47.00 INSOMNIA, UNSPECIFIED TYPE: ICD-10-CM

## 2024-03-12 DIAGNOSIS — I25.10 CORONARY ARTERY DISEASE INVOLVING NATIVE CORONARY ARTERY OF NATIVE HEART WITHOUT ANGINA PECTORIS: ICD-10-CM

## 2024-03-12 DIAGNOSIS — Z12.11 COLON CANCER SCREENING: ICD-10-CM

## 2024-03-12 DIAGNOSIS — F32.A DEPRESSION, UNSPECIFIED DEPRESSION TYPE: ICD-10-CM

## 2024-03-12 DIAGNOSIS — E78.5 HYPERLIPIDEMIA, UNSPECIFIED HYPERLIPIDEMIA TYPE: ICD-10-CM

## 2024-03-12 PROCEDURE — 3023F SPIROM DOC REV: CPT | Performed by: FAMILY MEDICINE

## 2024-03-12 PROCEDURE — 99214 OFFICE O/P EST MOD 30 MIN: CPT | Performed by: FAMILY MEDICINE

## 2024-03-12 PROCEDURE — G8419 CALC BMI OUT NRM PARAM NOF/U: HCPCS | Performed by: FAMILY MEDICINE

## 2024-03-12 PROCEDURE — G8427 DOCREV CUR MEDS BY ELIG CLIN: HCPCS | Performed by: FAMILY MEDICINE

## 2024-03-12 PROCEDURE — 3017F COLORECTAL CA SCREEN DOC REV: CPT | Performed by: FAMILY MEDICINE

## 2024-03-12 PROCEDURE — 4004F PT TOBACCO SCREEN RCVD TLK: CPT | Performed by: FAMILY MEDICINE

## 2024-03-12 PROCEDURE — G8484 FLU IMMUNIZE NO ADMIN: HCPCS | Performed by: FAMILY MEDICINE

## 2024-03-12 PROCEDURE — 1123F ACP DISCUSS/DSCN MKR DOCD: CPT | Performed by: FAMILY MEDICINE

## 2024-03-12 PROCEDURE — 1090F PRES/ABSN URINE INCON ASSESS: CPT | Performed by: FAMILY MEDICINE

## 2024-03-12 PROCEDURE — G8400 PT W/DXA NO RESULTS DOC: HCPCS | Performed by: FAMILY MEDICINE

## 2024-03-12 RX ORDER — DULOXETIN HYDROCHLORIDE 20 MG/1
20 CAPSULE, DELAYED RELEASE ORAL DAILY
Qty: 30 CAPSULE | Refills: 3 | Status: SHIPPED | OUTPATIENT
Start: 2024-03-12

## 2024-03-12 RX ORDER — ALBUTEROL SULFATE 90 UG/1
AEROSOL, METERED RESPIRATORY (INHALATION)
Qty: 18 G | Refills: 5 | Status: SHIPPED | OUTPATIENT
Start: 2024-03-12

## 2024-03-12 RX ORDER — PSYLLIUM HUSK 3.4 G/7G
POWDER ORAL
COMMUNITY
Start: 2024-02-17

## 2024-03-12 RX ORDER — MOMETASONE FUROATE AND FORMOTEROL FUMARATE DIHYDRATE 100; 5 UG/1; UG/1
AEROSOL RESPIRATORY (INHALATION)
COMMUNITY
Start: 2024-02-05 | End: 2024-03-12 | Stop reason: SDUPTHER

## 2024-03-12 RX ORDER — ATORVASTATIN CALCIUM 40 MG/1
40 TABLET, FILM COATED ORAL NIGHTLY
Qty: 90 TABLET | Refills: 1 | Status: SHIPPED | OUTPATIENT
Start: 2024-03-12

## 2024-03-12 RX ORDER — NALOXONE HYDROCHLORIDE 4 MG/.1ML
SPRAY NASAL
COMMUNITY
Start: 2024-02-17

## 2024-03-12 RX ORDER — ASPIRIN 81 MG/1
81 TABLET, CHEWABLE ORAL DAILY
Qty: 90 TABLET | Refills: 3 | Status: SHIPPED | OUTPATIENT
Start: 2024-03-12

## 2024-03-12 RX ORDER — MOMETASONE FUROATE AND FORMOTEROL FUMARATE DIHYDRATE 100; 5 UG/1; UG/1
AEROSOL RESPIRATORY (INHALATION)
Qty: 13 G | Refills: 5 | Status: SHIPPED | OUTPATIENT
Start: 2024-03-12

## 2024-03-12 ASSESSMENT — ENCOUNTER SYMPTOMS
GASTROINTESTINAL NEGATIVE: 1
SHORTNESS OF BREATH: 0
PHOTOPHOBIA: 0
EYE REDNESS: 0
RHINORRHEA: 0
SINUS PAIN: 0
COUGH: 0
EYE DISCHARGE: 0

## 2024-03-12 ASSESSMENT — PATIENT HEALTH QUESTIONNAIRE - PHQ9
SUM OF ALL RESPONSES TO PHQ QUESTIONS 1-9: 8
3. TROUBLE FALLING OR STAYING ASLEEP: NEARLY EVERY DAY
SUM OF ALL RESPONSES TO PHQ QUESTIONS 1-9: 8
6. FEELING BAD ABOUT YOURSELF - OR THAT YOU ARE A FAILURE OR HAVE LET YOURSELF OR YOUR FAMILY DOWN: 0
5. POOR APPETITE OR OVEREATING: 0
SUM OF ALL RESPONSES TO PHQ9 QUESTIONS 1 & 2: 2
SUM OF ALL RESPONSES TO PHQ QUESTIONS 1-9: 8
3. TROUBLE FALLING OR STAYING ASLEEP: 3
1. LITTLE INTEREST OR PLEASURE IN DOING THINGS: SEVERAL DAYS
9. THOUGHTS THAT YOU WOULD BE BETTER OFF DEAD, OR OF HURTING YOURSELF: NOT AT ALL
9. THOUGHTS THAT YOU WOULD BE BETTER OFF DEAD, OR OF HURTING YOURSELF: 0
8. MOVING OR SPEAKING SO SLOWLY THAT OTHER PEOPLE COULD HAVE NOTICED. OR THE OPPOSITE - BEING SO FIDGETY OR RESTLESS THAT YOU HAVE BEEN MOVING AROUND A LOT MORE THAN USUAL: NOT AT ALL
7. TROUBLE CONCENTRATING ON THINGS, SUCH AS READING THE NEWSPAPER OR WATCHING TELEVISION: 0
2. FEELING DOWN, DEPRESSED OR HOPELESS: 1
4. FEELING TIRED OR HAVING LITTLE ENERGY: 3
10. IF YOU CHECKED OFF ANY PROBLEMS, HOW DIFFICULT HAVE THESE PROBLEMS MADE IT FOR YOU TO DO YOUR WORK, TAKE CARE OF THINGS AT HOME, OR GET ALONG WITH OTHER PEOPLE: 1
SUM OF ALL RESPONSES TO PHQ QUESTIONS 1-9: 8
8. MOVING OR SPEAKING SO SLOWLY THAT OTHER PEOPLE COULD HAVE NOTICED. OR THE OPPOSITE, BEING SO FIGETY OR RESTLESS THAT YOU HAVE BEEN MOVING AROUND A LOT MORE THAN USUAL: 0
7. TROUBLE CONCENTRATING ON THINGS, SUCH AS READING THE NEWSPAPER OR WATCHING TELEVISION: NOT AT ALL
4. FEELING TIRED OR HAVING LITTLE ENERGY: NEARLY EVERY DAY
5. POOR APPETITE OR OVEREATING: NOT AT ALL
6. FEELING BAD ABOUT YOURSELF - OR THAT YOU ARE A FAILURE OR HAVE LET YOURSELF OR YOUR FAMILY DOWN: NOT AT ALL
2. FEELING DOWN, DEPRESSED OR HOPELESS: SEVERAL DAYS
10. IF YOU CHECKED OFF ANY PROBLEMS, HOW DIFFICULT HAVE THESE PROBLEMS MADE IT FOR YOU TO DO YOUR WORK, TAKE CARE OF THINGS AT HOME, OR GET ALONG WITH OTHER PEOPLE: SOMEWHAT DIFFICULT
1. LITTLE INTEREST OR PLEASURE IN DOING THINGS: 1
SUM OF ALL RESPONSES TO PHQ QUESTIONS 1-9: 8

## 2024-03-12 NOTE — PROGRESS NOTES
breast  -     PALLAVI DIGITAL SCREEN BILATERAL PER PROTOCOL; Future    Colon cancer screening  -     Louis Oconnell DO, Gastro[facility]ology[facility] ([facility])    Depression, unspecified depression type  -     TSH; Future  Patient is started on duloxetine 20 mg daily.    Insomnia, unspecified type    Patient's insomnia may improve with treatment of her anxiety.    Return in about 2 weeks (around 3/26/2024).         Electronically signed by Nehemias Ayala DO on 3/12/2024

## 2024-03-22 ENCOUNTER — COMMUNITY OUTREACH (OUTPATIENT)
Dept: FAMILY MEDICINE CLINIC | Age: 74
End: 2024-03-22

## 2024-03-22 NOTE — PROGRESS NOTES
Patient's HM shows they are overdue for Mammogram, Colorectal Screening and Dexa.  Care Everywhere and  files searched.  No results to attach to order nor HM updated.

## 2024-04-02 ENCOUNTER — TELEPHONE (OUTPATIENT)
Dept: FAMILY MEDICINE CLINIC | Age: 74
End: 2024-04-02

## 2024-04-02 NOTE — TELEPHONE ENCOUNTER
Darlyn from Home Health called about oxygen.  The OV notes say 4L of O2 at night but she has an order from Doctor that states O2 continuous.   She needs to get a PA to insurance for O2 for the year but can not with 2 different statements.  She needs them to state the same.  Darlyn's number is [phone]

## 2024-05-02 DIAGNOSIS — J44.9 CHRONIC OBSTRUCTIVE PULMONARY DISEASE, UNSPECIFIED COPD TYPE (HCC): Primary | ICD-10-CM

## 2024-05-03 ENCOUNTER — NURSE ONLY (OUTPATIENT)
Dept: FAMILY MEDICINE CLINIC | Age: 74
End: 2024-05-03

## 2024-05-03 NOTE — PROGRESS NOTES
Patient came in today for pulse ox certification for her home oxygen. She uses 4L continuous flow at home via nasal cannula. Without her oxygen she gets very sob  and coughs . Her oxygen was 86 % on room air. I did leave [facility] care a vm and Dr Ayala will sign off on her forms next week. Patient is asking for portable oxygen because the tanks are too heavy for her to carry around

## 2024-06-18 DIAGNOSIS — J44.9 CHRONIC OBSTRUCTIVE PULMONARY DISEASE, UNSPECIFIED COPD TYPE (HCC): Primary | ICD-10-CM

## 2024-06-18 DIAGNOSIS — Z99.81 OXYGEN DEPENDENT: ICD-10-CM

## 2024-12-26 ENCOUNTER — HOSPITAL ENCOUNTER (EMERGENCY)
Age: 74
Discharge: HOME | End: 2024-12-27
Attending: EMERGENCY MEDICINE
Payer: COMMERCIAL

## 2024-12-26 ENCOUNTER — APPOINTMENT (OUTPATIENT)
Dept: GENERAL RADIOLOGY | Age: 74
End: 2024-12-26
Payer: COMMERCIAL

## 2024-12-26 VITALS
HEART RATE: 75 BPM | BODY MASS INDEX: 16.97 KG/M2 | TEMPERATURE: 99 F | WEIGHT: 102 LBS | SYSTOLIC BLOOD PRESSURE: 119 MMHG | OXYGEN SATURATION: 93 % | RESPIRATION RATE: 18 BRPM | DIASTOLIC BLOOD PRESSURE: 73 MMHG

## 2024-12-26 DIAGNOSIS — J18.9 COMMUNITY ACQUIRED PNEUMONIA OF RIGHT LOWER LOBE OF LUNG: Primary | ICD-10-CM

## 2024-12-26 LAB
ANION GAP SERPL CALCULATED.3IONS-SCNC: 9 MMOL/L (ref 7–16)
BASOPHILS # BLD: 0.03 K/UL (ref 0–0.2)
BASOPHILS NFR BLD: 0 % (ref 0–2)
BUN SERPL-MCNC: 15 MG/DL (ref 6–23)
CALCIUM SERPL-MCNC: 10.1 MG/DL (ref 8.6–10.2)
CHLORIDE SERPL-SCNC: 105 MMOL/L (ref 98–107)
CO2 SERPL-SCNC: 27 MMOL/L (ref 22–29)
CREAT SERPL-MCNC: 0.5 MG/DL (ref 0.5–1)
EOSINOPHIL # BLD: 0.1 K/UL (ref 0.05–0.5)
EOSINOPHILS RELATIVE PERCENT: 1 % (ref 0–6)
ERYTHROCYTE [DISTWIDTH] IN BLOOD BY AUTOMATED COUNT: 13.9 % (ref 11.5–15)
FLUAV RNA RESP QL NAA+PROBE: NOT DETECTED
FLUBV RNA RESP QL NAA+PROBE: NOT DETECTED
GFR, ESTIMATED: >90 ML/MIN/1.73M2
GLUCOSE SERPL-MCNC: 94 MG/DL (ref 74–99)
HCT VFR BLD AUTO: 31.4 % (ref 34–48)
HGB BLD-MCNC: 10.3 G/DL (ref 11.5–15.5)
IMM GRANULOCYTES # BLD AUTO: 0.04 K/UL (ref 0–0.58)
IMM GRANULOCYTES NFR BLD: 0 % (ref 0–5)
LYMPHOCYTES NFR BLD: 1.51 K/UL (ref 1.5–4)
LYMPHOCYTES RELATIVE PERCENT: 14 % (ref 20–42)
MAGNESIUM SERPL-MCNC: 2 MG/DL (ref 1.6–2.6)
MCH RBC QN AUTO: 31.2 PG (ref 26–35)
MCHC RBC AUTO-ENTMCNC: 32.8 G/DL (ref 32–34.5)
MCV RBC AUTO: 95.2 FL (ref 80–99.9)
MONOCYTES NFR BLD: 0.87 K/UL (ref 0.1–0.95)
MONOCYTES NFR BLD: 8 % (ref 2–12)
NEUTROPHILS NFR BLD: 77 % (ref 43–80)
NEUTS SEG NFR BLD: 8.39 K/UL (ref 1.8–7.3)
PLATELET # BLD AUTO: 225 K/UL (ref 130–450)
PMV BLD AUTO: 10.4 FL (ref 7–12)
POTASSIUM SERPL-SCNC: 3.3 MMOL/L (ref 3.5–5)
RBC # BLD AUTO: 3.3 M/UL (ref 3.5–5.5)
SARS-COV-2 RNA RESP QL NAA+PROBE: NOT DETECTED
SODIUM SERPL-SCNC: 141 MMOL/L (ref 132–146)
SOURCE: NORMAL
SPECIMEN DESCRIPTION: NORMAL
TROPONIN I SERPL HS-MCNC: <6 NG/L (ref 0–9)
WBC OTHER # BLD: 10.9 K/UL (ref 4.5–11.5)

## 2024-12-26 PROCEDURE — 84484 ASSAY OF TROPONIN QUANT: CPT

## 2024-12-26 PROCEDURE — 96365 THER/PROPH/DIAG IV INF INIT: CPT

## 2024-12-26 PROCEDURE — 6370000000 HC RX 637 (ALT 250 FOR IP)

## 2024-12-26 PROCEDURE — 2580000003 HC RX 258

## 2024-12-26 PROCEDURE — 80048 BASIC METABOLIC PNL TOTAL CA: CPT

## 2024-12-26 PROCEDURE — 99285 EMERGENCY DEPT VISIT HI MDM: CPT

## 2024-12-26 PROCEDURE — 93005 ELECTROCARDIOGRAM TRACING: CPT

## 2024-12-26 PROCEDURE — 83735 ASSAY OF MAGNESIUM: CPT

## 2024-12-26 PROCEDURE — 87636 SARSCOV2 & INF A&B AMP PRB: CPT

## 2024-12-26 PROCEDURE — 2500000003 HC RX 250 WO HCPCS

## 2024-12-26 PROCEDURE — 94640 AIRWAY INHALATION TREATMENT: CPT

## 2024-12-26 PROCEDURE — 96366 THER/PROPH/DIAG IV INF ADDON: CPT

## 2024-12-26 PROCEDURE — 71045 X-RAY EXAM CHEST 1 VIEW: CPT

## 2024-12-26 PROCEDURE — 6360000002 HC RX W HCPCS

## 2024-12-26 PROCEDURE — 96375 TX/PRO/DX INJ NEW DRUG ADDON: CPT

## 2024-12-26 PROCEDURE — 85025 COMPLETE CBC W/AUTO DIFF WBC: CPT

## 2024-12-26 RX ORDER — DOXYCYCLINE HYCLATE 100 MG
100 TABLET ORAL 2 TIMES DAILY
Qty: 20 TABLET | Refills: 0 | Status: SHIPPED | OUTPATIENT
Start: 2024-12-26 | End: 2024-12-26

## 2024-12-26 RX ORDER — PREDNISONE 20 MG/1
60 TABLET ORAL DAILY
Status: DISCONTINUED | OUTPATIENT
Start: 2024-12-26 | End: 2024-12-27 | Stop reason: HOSPADM

## 2024-12-26 RX ORDER — IPRATROPIUM BROMIDE AND ALBUTEROL SULFATE 2.5; .5 MG/3ML; MG/3ML
1 SOLUTION RESPIRATORY (INHALATION) ONCE
Status: COMPLETED | OUTPATIENT
Start: 2024-12-26 | End: 2024-12-26

## 2024-12-26 RX ORDER — CEFDINIR 300 MG/1
300 CAPSULE ORAL 2 TIMES DAILY
Qty: 20 CAPSULE | Refills: 0 | Status: SHIPPED | OUTPATIENT
Start: 2024-12-26 | End: 2024-12-26

## 2024-12-26 RX ORDER — CEFDINIR 300 MG/1
300 CAPSULE ORAL 2 TIMES DAILY
Qty: 20 CAPSULE | Refills: 0 | Status: SHIPPED | OUTPATIENT
Start: 2024-12-26 | End: 2025-01-05

## 2024-12-26 RX ORDER — DOXYCYCLINE HYCLATE 100 MG
100 TABLET ORAL 2 TIMES DAILY
Qty: 20 TABLET | Refills: 0 | Status: SHIPPED | OUTPATIENT
Start: 2024-12-26 | End: 2025-01-05

## 2024-12-26 RX ADMIN — DOXYCYCLINE 100 MG: 100 INJECTION, POWDER, LYOPHILIZED, FOR SOLUTION INTRAVENOUS at 21:46

## 2024-12-26 RX ADMIN — POTASSIUM BICARBONATE 25 MEQ: 25 TABLET, EFFERVESCENT ORAL at 21:45

## 2024-12-26 RX ADMIN — PREDNISONE 60 MG: 20 TABLET ORAL at 20:36

## 2024-12-26 RX ADMIN — WATER 2000 MG: 1 INJECTION INTRAMUSCULAR; INTRAVENOUS; SUBCUTANEOUS at 21:44

## 2024-12-26 RX ADMIN — IPRATROPIUM BROMIDE AND ALBUTEROL SULFATE 1 DOSE: .5; 2.5 SOLUTION RESPIRATORY (INHALATION) at 20:22

## 2024-12-26 ASSESSMENT — LIFESTYLE VARIABLES
HOW MANY STANDARD DRINKS CONTAINING ALCOHOL DO YOU HAVE ON A TYPICAL DAY: PATIENT DOES NOT DRINK
HOW OFTEN DO YOU HAVE A DRINK CONTAINING ALCOHOL: NEVER

## 2024-12-26 ASSESSMENT — PAIN - FUNCTIONAL ASSESSMENT: PAIN_FUNCTIONAL_ASSESSMENT: NONE - DENIES PAIN

## 2024-12-27 LAB
EKG ATRIAL RATE: 86 BPM
EKG P AXIS: 60 DEGREES
EKG P-R INTERVAL: 180 MS
EKG Q-T INTERVAL: 392 MS
EKG QRS DURATION: 84 MS
EKG QTC CALCULATION (BAZETT): 469 MS
EKG R AXIS: 62 DEGREES
EKG T AXIS: 64 DEGREES
EKG VENTRICULAR RATE: 86 BPM

## 2024-12-27 PROCEDURE — 93010 ELECTROCARDIOGRAM REPORT: CPT | Performed by: INTERNAL MEDICINE

## 2024-12-27 NOTE — ED PROVIDER NOTES
74-year-old female with history of COPD chronically on nasal cannula 2 to 3 L also has a history of coronary artery disease and polysubstance abuse.  She presents to the Emergency Department after she had a follow-up routine at her primary care physician's office.  She states that her oxygen level was low but at that time she was not hooked up to oxygen which prompted them to come to the emergency department.  She denies symptoms such as the following: Chest pain, shortness of breath, abdominal pain, nausea, vomiting, fever, chills, urinary symptoms, GI bleed, body aches.  Patient states that she wants to go home but agrees to be evaluated.        Chief Complaint   Patient presents with    Low Pulse ox      Pt states that she was sent in due to low pulse ox, pt is suppose to be on oxygen wasn't wearing it        Review of Systems   Pert stated in the hpi above   Physical Exam  Vitals reviewed.   Constitutional:       General: She is not in acute distress.     Appearance: She is not ill-appearing.   HENT:      Head: Normocephalic.      Right Ear: External ear normal.      Left Ear: External ear normal.      Nose: Nose normal.      Mouth/Throat:      Mouth: Mucous membranes are moist.   Eyes:      General:         Right eye: No discharge.         Left eye: No discharge.      Conjunctiva/sclera: Conjunctivae normal.   Cardiovascular:      Rate and Rhythm: Normal rate and regular rhythm.      Heart sounds:      No friction rub. No gallop.   Pulmonary:      Effort: No respiratory distress.      Breath sounds: No stridor. Wheezing present.   Abdominal:      General: There is no distension.      Tenderness: There is no abdominal tenderness. There is no guarding or rebound.   Musculoskeletal:         General: No deformity or signs of injury.      Cervical back: Normal range of motion and neck supple. No rigidity or tenderness.   Skin:     Coloration: Skin is not jaundiced.   Neurological:      General: No focal deficit

## 2024-12-27 NOTE — ED NOTES
Pt treatment refusal form signed. Pt verbalized her understanding of the necessity of the test. Pt refused ABG, Dr. Snow notified.

## 2025-04-30 NOTE — Clinical Note
Ruddy Marreroens                                                                [address] [place] [id number]         4/30/25     Dear Ms. Llanes:  MRN:[id number]    This message is regarding a referral that needs to be scheduled. We were unable to reach you by phone; however, your referral is available for review in [facility] under insurance in the drop down menu. We will be making further attempts to contact you to get this scheduled.       Thank you,  Christiano [facility]

## 2025-05-04 ENCOUNTER — APPOINTMENT (OUTPATIENT)
Dept: GENERAL RADIOLOGY | Age: 75
DRG: 175 | End: 2025-05-04
Payer: COMMERCIAL

## 2025-05-04 ENCOUNTER — HOSPITAL ENCOUNTER (INPATIENT)
Age: 75
LOS: 2 days | Discharge: HOME | DRG: 175 | End: 2025-05-06
Attending: STUDENT IN AN ORGANIZED HEALTH CARE EDUCATION/TRAINING PROGRAM | Admitting: INTERNAL MEDICINE
Payer: COMMERCIAL

## 2025-05-04 DIAGNOSIS — J44.1 COPD EXACERBATION (HCC): ICD-10-CM

## 2025-05-04 DIAGNOSIS — R07.9 CHEST PAIN, UNSPECIFIED TYPE: Primary | ICD-10-CM

## 2025-05-04 DIAGNOSIS — I25.10 CORONARY ARTERY DISEASE DUE TO CALCIFIED CORONARY LESION: ICD-10-CM

## 2025-05-04 DIAGNOSIS — R06.02 SHORTNESS OF BREATH: ICD-10-CM

## 2025-05-04 DIAGNOSIS — I20.0 UNSTABLE ANGINA (HCC): ICD-10-CM

## 2025-05-04 DIAGNOSIS — J90 PLEURAL EFFUSION: ICD-10-CM

## 2025-05-04 DIAGNOSIS — I25.84 CORONARY ARTERY DISEASE DUE TO CALCIFIED CORONARY LESION: ICD-10-CM

## 2025-05-04 DIAGNOSIS — I25.10 CAD (CORONARY ARTERY DISEASE): ICD-10-CM

## 2025-05-04 PROBLEM — J96.11 CHRONIC RESPIRATORY FAILURE WITH HYPOXIA (HCC): Status: ACTIVE | Noted: 2025-05-04

## 2025-05-04 PROBLEM — F41.9 ANXIETY: Status: ACTIVE | Noted: 2025-05-04

## 2025-05-04 PROBLEM — F19.10 DRUG ABUSE (HCC): Status: ACTIVE | Noted: 2025-05-04

## 2025-05-04 LAB
ALBUMIN SERPL-MCNC: 4.1 G/DL (ref 3.5–5.2)
ALP SERPL-CCNC: 81 U/L (ref 35–104)
ALT SERPL-CCNC: 10 U/L (ref 0–32)
ANION GAP SERPL CALCULATED.3IONS-SCNC: 11 MMOL/L (ref 7–16)
AST SERPL-CCNC: 14 U/L (ref 0–31)
B PARAP IS1001 DNA NPH QL NAA+NON-PROBE: NOT DETECTED
B PERT DNA SPEC QL NAA+PROBE: NOT DETECTED
BASOPHILS # BLD: 0.03 K/UL (ref 0–0.2)
BASOPHILS NFR BLD: 0 % (ref 0–2)
BILIRUB SERPL-MCNC: 0.2 MG/DL (ref 0–1.2)
BNP SERPL-MCNC: 661 PG/ML (ref 0–450)
BUN SERPL-MCNC: 13 MG/DL (ref 6–23)
C PNEUM DNA NPH QL NAA+NON-PROBE: NOT DETECTED
CALCIUM SERPL-MCNC: 9.6 MG/DL (ref 8.6–10.2)
CHLORIDE SERPL-SCNC: 109 MMOL/L (ref 98–107)
CO2 SERPL-SCNC: 21 MMOL/L (ref 22–29)
CREAT SERPL-MCNC: 0.5 MG/DL (ref 0.5–1)
D-DIMER QUANTITATIVE: <200 NG/ML DDU (ref 0–230)
EKG ATRIAL RATE: 75 BPM
EKG P AXIS: 69 DEGREES
EKG P-R INTERVAL: 158 MS
EKG Q-T INTERVAL: 422 MS
EKG QRS DURATION: 74 MS
EKG QTC CALCULATION (BAZETT): 471 MS
EKG R AXIS: 76 DEGREES
EKG T AXIS: 80 DEGREES
EKG VENTRICULAR RATE: 75 BPM
EOSINOPHIL # BLD: 0.09 K/UL (ref 0.05–0.5)
EOSINOPHILS RELATIVE PERCENT: 1 % (ref 0–6)
ERYTHROCYTE [DISTWIDTH] IN BLOOD BY AUTOMATED COUNT: 14.1 % (ref 11.5–15)
FLUAV RNA NPH QL NAA+NON-PROBE: NOT DETECTED
FLUAV RNA RESP QL NAA+PROBE: NOT DETECTED
FLUBV RNA NPH QL NAA+NON-PROBE: NOT DETECTED
FLUBV RNA RESP QL NAA+PROBE: NOT DETECTED
GFR, ESTIMATED: >90 ML/MIN/1.73M2
GLUCOSE SERPL-MCNC: 98 MG/DL (ref 74–99)
HADV DNA NPH QL NAA+NON-PROBE: NOT DETECTED
HCOV 229E RNA NPH QL NAA+NON-PROBE: NOT DETECTED
HCOV HKU1 RNA NPH QL NAA+NON-PROBE: NOT DETECTED
HCOV NL63 RNA NPH QL NAA+NON-PROBE: NOT DETECTED
HCOV OC43 RNA NPH QL NAA+NON-PROBE: NOT DETECTED
HCT VFR BLD AUTO: 36.9 % (ref 34–48)
HGB BLD-MCNC: 12.2 G/DL (ref 11.5–15.5)
HMPV RNA NPH QL NAA+NON-PROBE: NOT DETECTED
HPIV1 RNA NPH QL NAA+NON-PROBE: NOT DETECTED
HPIV2 RNA NPH QL NAA+NON-PROBE: NOT DETECTED
HPIV3 RNA NPH QL NAA+NON-PROBE: NOT DETECTED
HPIV4 RNA NPH QL NAA+NON-PROBE: NOT DETECTED
IMM GRANULOCYTES # BLD AUTO: <0.03 K/UL (ref 0–0.58)
IMM GRANULOCYTES NFR BLD: 0 % (ref 0–5)
LYMPHOCYTES NFR BLD: 1.69 K/UL (ref 1.5–4)
LYMPHOCYTES RELATIVE PERCENT: 20 % (ref 20–42)
M PNEUMO DNA NPH QL NAA+NON-PROBE: NOT DETECTED
MAGNESIUM SERPL-MCNC: 2.1 MG/DL (ref 1.6–2.6)
MCH RBC QN AUTO: 31.3 PG (ref 26–35)
MCHC RBC AUTO-ENTMCNC: 33.1 G/DL (ref 32–34.5)
MCV RBC AUTO: 94.6 FL (ref 80–99.9)
MONOCYTES NFR BLD: 0.47 K/UL (ref 0.1–0.95)
MONOCYTES NFR BLD: 6 % (ref 2–12)
NEUTROPHILS NFR BLD: 72 % (ref 43–80)
NEUTS SEG NFR BLD: 5.99 K/UL (ref 1.8–7.3)
PLATELET # BLD AUTO: 257 K/UL (ref 130–450)
PMV BLD AUTO: 10.1 FL (ref 7–12)
POTASSIUM SERPL-SCNC: 4.2 MMOL/L (ref 3.5–5)
PROT SERPL-MCNC: 6.9 G/DL (ref 6.4–8.3)
RBC # BLD AUTO: 3.9 M/UL (ref 3.5–5.5)
RSV BY PCR: NOT DETECTED
RSV RNA NPH QL NAA+NON-PROBE: NOT DETECTED
RV+EV RNA NPH QL NAA+NON-PROBE: NOT DETECTED
SARS-COV-2 RNA NPH QL NAA+NON-PROBE: NOT DETECTED
SARS-COV-2 RNA RESP QL NAA+PROBE: NOT DETECTED
SODIUM SERPL-SCNC: 141 MMOL/L (ref 132–146)
SOURCE: NORMAL
SPECIMEN DESCRIPTION: NORMAL
SPECIMEN DESCRIPTION: NORMAL
SPECIMEN SOURCE: NORMAL
TROPONIN I SERPL HS-MCNC: 7 NG/L (ref 0–14)
TROPONIN I SERPL HS-MCNC: 8 NG/L (ref 0–14)
TROPONIN I SERPL HS-MCNC: <6 NG/L (ref 0–14)
WBC OTHER # BLD: 8.3 K/UL (ref 4.5–11.5)

## 2025-05-04 PROCEDURE — 85025 COMPLETE CBC W/AUTO DIFF WBC: CPT

## 2025-05-04 PROCEDURE — 1200000000 HC SEMI PRIVATE

## 2025-05-04 PROCEDURE — 6370000000 HC RX 637 (ALT 250 FOR IP)

## 2025-05-04 PROCEDURE — 87636 SARSCOV2 & INF A&B AMP PRB: CPT

## 2025-05-04 PROCEDURE — 71045 X-RAY EXAM CHEST 1 VIEW: CPT

## 2025-05-04 PROCEDURE — 93010 ELECTROCARDIOGRAM REPORT: CPT | Performed by: INTERNAL MEDICINE

## 2025-05-04 PROCEDURE — 6370000000 HC RX 637 (ALT 250 FOR IP): Performed by: NURSE PRACTITIONER

## 2025-05-04 PROCEDURE — 2500000003 HC RX 250 WO HCPCS: Performed by: INTERNAL MEDICINE

## 2025-05-04 PROCEDURE — 83735 ASSAY OF MAGNESIUM: CPT

## 2025-05-04 PROCEDURE — 93005 ELECTROCARDIOGRAM TRACING: CPT

## 2025-05-04 PROCEDURE — 0202U NFCT DS 22 TRGT SARS-COV-2: CPT

## 2025-05-04 PROCEDURE — 6360000002 HC RX W HCPCS: Performed by: INTERNAL MEDICINE

## 2025-05-04 PROCEDURE — 6360000002 HC RX W HCPCS

## 2025-05-04 PROCEDURE — 99285 EMERGENCY DEPT VISIT HI MDM: CPT

## 2025-05-04 PROCEDURE — 83880 ASSAY OF NATRIURETIC PEPTIDE: CPT

## 2025-05-04 PROCEDURE — 99222 1ST HOSP IP/OBS MODERATE 55: CPT | Performed by: INTERNAL MEDICINE

## 2025-05-04 PROCEDURE — 87634 RSV DNA/RNA AMP PROBE: CPT

## 2025-05-04 PROCEDURE — 6370000000 HC RX 637 (ALT 250 FOR IP): Performed by: INTERNAL MEDICINE

## 2025-05-04 PROCEDURE — 85379 FIBRIN DEGRADATION QUANT: CPT

## 2025-05-04 PROCEDURE — 80053 COMPREHEN METABOLIC PANEL: CPT

## 2025-05-04 PROCEDURE — 84484 ASSAY OF TROPONIN QUANT: CPT

## 2025-05-04 PROCEDURE — 2580000003 HC RX 258: Performed by: INTERNAL MEDICINE

## 2025-05-04 PROCEDURE — 96375 TX/PRO/DX INJ NEW DRUG ADDON: CPT

## 2025-05-04 PROCEDURE — 2500000003 HC RX 250 WO HCPCS

## 2025-05-04 PROCEDURE — 2700000000 HC OXYGEN THERAPY PER DAY

## 2025-05-04 PROCEDURE — APPSS45 APP SPLIT SHARED TIME 31-45 MINUTES: Performed by: NURSE PRACTITIONER

## 2025-05-04 PROCEDURE — 94640 AIRWAY INHALATION TREATMENT: CPT

## 2025-05-04 PROCEDURE — 80307 DRUG TEST PRSMV CHEM ANLYZR: CPT

## 2025-05-04 PROCEDURE — 96374 THER/PROPH/DIAG INJ IV PUSH: CPT

## 2025-05-04 RX ORDER — IPRATROPIUM BROMIDE AND ALBUTEROL SULFATE 2.5; .5 MG/3ML; MG/3ML
1 SOLUTION RESPIRATORY (INHALATION)
Status: DISCONTINUED | OUTPATIENT
Start: 2025-05-04 | End: 2025-05-06 | Stop reason: HOSPADM

## 2025-05-04 RX ORDER — ACETAMINOPHEN 650 MG/1
650 SUPPOSITORY RECTAL EVERY 6 HOURS PRN
Status: DISCONTINUED | OUTPATIENT
Start: 2025-05-04 | End: 2025-05-06 | Stop reason: HOSPADM

## 2025-05-04 RX ORDER — ONDANSETRON 4 MG/1
4 TABLET, ORALLY DISINTEGRATING ORAL EVERY 8 HOURS PRN
Status: DISCONTINUED | OUTPATIENT
Start: 2025-05-04 | End: 2025-05-06 | Stop reason: HOSPADM

## 2025-05-04 RX ORDER — ASPIRIN 81 MG/1
81 TABLET, CHEWABLE ORAL DAILY
Status: DISCONTINUED | OUTPATIENT
Start: 2025-05-05 | End: 2025-05-06 | Stop reason: HOSPADM

## 2025-05-04 RX ORDER — SODIUM CHLORIDE 9 MG/ML
INJECTION, SOLUTION INTRAVENOUS PRN
Status: DISCONTINUED | OUTPATIENT
Start: 2025-05-04 | End: 2025-05-06 | Stop reason: HOSPADM

## 2025-05-04 RX ORDER — POTASSIUM CHLORIDE 1500 MG/1
40 TABLET, EXTENDED RELEASE ORAL PRN
Status: DISCONTINUED | OUTPATIENT
Start: 2025-05-04 | End: 2025-05-06 | Stop reason: HOSPADM

## 2025-05-04 RX ORDER — SODIUM CHLORIDE 0.9 % (FLUSH) 0.9 %
5-40 SYRINGE (ML) INJECTION EVERY 12 HOURS SCHEDULED
Status: DISCONTINUED | OUTPATIENT
Start: 2025-05-04 | End: 2025-05-06 | Stop reason: HOSPADM

## 2025-05-04 RX ORDER — SODIUM CHLORIDE 0.9 % (FLUSH) 0.9 %
5-40 SYRINGE (ML) INJECTION PRN
Status: DISCONTINUED | OUTPATIENT
Start: 2025-05-04 | End: 2025-05-06 | Stop reason: HOSPADM

## 2025-05-04 RX ORDER — DULOXETIN HYDROCHLORIDE 20 MG/1
20 CAPSULE, DELAYED RELEASE ORAL DAILY
Status: DISCONTINUED | OUTPATIENT
Start: 2025-05-04 | End: 2025-05-05

## 2025-05-04 RX ORDER — ATORVASTATIN CALCIUM 40 MG/1
40 TABLET, FILM COATED ORAL NIGHTLY
Status: DISCONTINUED | OUTPATIENT
Start: 2025-05-05 | End: 2025-05-06 | Stop reason: HOSPADM

## 2025-05-04 RX ORDER — POLYETHYLENE GLYCOL 3350 17 G/17G
17 POWDER, FOR SOLUTION ORAL DAILY PRN
Status: DISCONTINUED | OUTPATIENT
Start: 2025-05-04 | End: 2025-05-06 | Stop reason: HOSPADM

## 2025-05-04 RX ORDER — MAGNESIUM SULFATE IN WATER 40 MG/ML
2000 INJECTION, SOLUTION INTRAVENOUS PRN
Status: DISCONTINUED | OUTPATIENT
Start: 2025-05-04 | End: 2025-05-06 | Stop reason: HOSPADM

## 2025-05-04 RX ORDER — BUPRENORPHINE HYDROCHLORIDE AND NALOXONE HYDROCHLORIDE DIHYDRATE 8; 2 MG/1; MG/1
1 TABLET SUBLINGUAL 3 TIMES DAILY
Status: DISCONTINUED | OUTPATIENT
Start: 2025-05-04 | End: 2025-05-06 | Stop reason: HOSPADM

## 2025-05-04 RX ORDER — ENOXAPARIN SODIUM 100 MG/ML
30 INJECTION SUBCUTANEOUS DAILY
Status: DISCONTINUED | OUTPATIENT
Start: 2025-05-05 | End: 2025-05-06 | Stop reason: HOSPADM

## 2025-05-04 RX ORDER — ONDANSETRON 2 MG/ML
4 INJECTION INTRAMUSCULAR; INTRAVENOUS EVERY 6 HOURS PRN
Status: DISCONTINUED | OUTPATIENT
Start: 2025-05-04 | End: 2025-05-06 | Stop reason: HOSPADM

## 2025-05-04 RX ORDER — ASPIRIN 81 MG/1
324 TABLET, CHEWABLE ORAL ONCE
Status: COMPLETED | OUTPATIENT
Start: 2025-05-04 | End: 2025-05-04

## 2025-05-04 RX ORDER — IPRATROPIUM BROMIDE AND ALBUTEROL SULFATE 2.5; .5 MG/3ML; MG/3ML
3 SOLUTION RESPIRATORY (INHALATION) ONCE
Status: COMPLETED | OUTPATIENT
Start: 2025-05-04 | End: 2025-05-04

## 2025-05-04 RX ORDER — ONDANSETRON 2 MG/ML
4 INJECTION INTRAMUSCULAR; INTRAVENOUS ONCE
Status: COMPLETED | OUTPATIENT
Start: 2025-05-04 | End: 2025-05-04

## 2025-05-04 RX ORDER — POTASSIUM CHLORIDE 7.45 MG/ML
10 INJECTION INTRAVENOUS PRN
Status: DISCONTINUED | OUTPATIENT
Start: 2025-05-04 | End: 2025-05-06 | Stop reason: HOSPADM

## 2025-05-04 RX ORDER — METHOCARBAMOL 500 MG/1
750 TABLET, FILM COATED ORAL ONCE
Status: COMPLETED | OUTPATIENT
Start: 2025-05-04 | End: 2025-05-04

## 2025-05-04 RX ORDER — PANTOPRAZOLE SODIUM 40 MG/1
40 TABLET, DELAYED RELEASE ORAL
Status: DISCONTINUED | OUTPATIENT
Start: 2025-05-05 | End: 2025-05-06 | Stop reason: HOSPADM

## 2025-05-04 RX ORDER — MECOBALAMIN 5000 MCG
5 TABLET,DISINTEGRATING ORAL NIGHTLY PRN
Status: DISCONTINUED | OUTPATIENT
Start: 2025-05-04 | End: 2025-05-06 | Stop reason: HOSPADM

## 2025-05-04 RX ORDER — ACETAMINOPHEN 325 MG/1
650 TABLET ORAL EVERY 6 HOURS PRN
Status: DISCONTINUED | OUTPATIENT
Start: 2025-05-04 | End: 2025-05-06 | Stop reason: HOSPADM

## 2025-05-04 RX ORDER — BUSPIRONE HYDROCHLORIDE 5 MG/1
5 TABLET ORAL 3 TIMES DAILY
Status: DISCONTINUED | OUTPATIENT
Start: 2025-05-04 | End: 2025-05-06 | Stop reason: HOSPADM

## 2025-05-04 RX ORDER — NICOTINE 21 MG/24HR
1 PATCH, TRANSDERMAL 24 HOURS TRANSDERMAL DAILY
Status: DISCONTINUED | OUTPATIENT
Start: 2025-05-04 | End: 2025-05-06 | Stop reason: HOSPADM

## 2025-05-04 RX ORDER — BUDESONIDE 0.5 MG/2ML
0.5 INHALANT ORAL
Status: DISCONTINUED | OUTPATIENT
Start: 2025-05-04 | End: 2025-05-06 | Stop reason: HOSPADM

## 2025-05-04 RX ADMIN — WATER 125 MG: 1 INJECTION INTRAMUSCULAR; INTRAVENOUS; SUBCUTANEOUS at 11:35

## 2025-05-04 RX ADMIN — LIDOCAINE HYDROCHLORIDE: 20 SOLUTION ORAL at 11:35

## 2025-05-04 RX ADMIN — IPRATROPIUM BROMIDE AND ALBUTEROL SULFATE 1 DOSE: .5; 2.5 SOLUTION RESPIRATORY (INHALATION) at 15:53

## 2025-05-04 RX ADMIN — METHOCARBAMOL 750 MG: 500 TABLET ORAL at 13:38

## 2025-05-04 RX ADMIN — BUPRENORPHINE HYDROCHLORIDE AND NALOXONE HYDROCHLORIDE DIHYDRATE 1 TABLET: 8; 2 TABLET SUBLINGUAL at 21:13

## 2025-05-04 RX ADMIN — WATER 1000 MG: 1 INJECTION INTRAMUSCULAR; INTRAVENOUS; SUBCUTANEOUS at 15:59

## 2025-05-04 RX ADMIN — AZITHROMYCIN DIHYDRATE 500 MG: 500 INJECTION, POWDER, LYOPHILIZED, FOR SOLUTION INTRAVENOUS at 16:09

## 2025-05-04 RX ADMIN — BUSPIRONE HYDROCHLORIDE 5 MG: 5 TABLET ORAL at 21:13

## 2025-05-04 RX ADMIN — BUDESONIDE 500 MCG: 0.5 SUSPENSION RESPIRATORY (INHALATION) at 15:52

## 2025-05-04 RX ADMIN — ONDANSETRON 4 MG: 2 INJECTION, SOLUTION INTRAMUSCULAR; INTRAVENOUS at 11:34

## 2025-05-04 RX ADMIN — ASPIRIN 324 MG: 81 TABLET, CHEWABLE ORAL at 13:38

## 2025-05-04 RX ADMIN — ONDANSETRON 4 MG: 4 TABLET, ORALLY DISINTEGRATING ORAL at 23:11

## 2025-05-04 RX ADMIN — IPRATROPIUM BROMIDE AND ALBUTEROL SULFATE 3 DOSE: .5; 2.5 SOLUTION RESPIRATORY (INHALATION) at 11:39

## 2025-05-04 RX ADMIN — BUSPIRONE HYDROCHLORIDE 5 MG: 5 TABLET ORAL at 17:12

## 2025-05-04 RX ADMIN — ACETAMINOPHEN 650 MG: 325 TABLET ORAL at 16:00

## 2025-05-04 NOTE — H&P
[facility]   History and Physical    CHIEF COMPLAINT:  Chest pain    History of Present Illness:  Ruddy Llanes is a 74 y.o. female with a history of COPD with chronic hypoxia, HTN, HLD, anxiety and depression who presents with Chest Pain (Pt presents to ED due to chest pain x1 week)  Pt reports burning & pain to chest for the last week. Pain is aggravated by exertion and had been alleviated by rest until this morning when pain persisted at rest. She also reports nausea, 1 episode of emesis this am and worsening dyspnea over the last few days. She states that she has had ongoing cough since having pna a few months ago, but the cough has also worsened recently & now with yellow/green sputum.   VS stable in ER. Flu/covid, RSV negative. Lab work grossly unremarkable. CXR notes small-moderate right pleural effusion on background hyperinflated lungs.   While in ER, pt received GI cocktail, loading dose asa, robaxin, zofran, stacked duonebs and 125mg solumedrol.     WORK UP SINCE ARRIVAL:  Results for orders placed or performed during the hospital encounter of 05/04/25   COVID-19 & Influenza Combo    Specimen: Nasopharyngeal Swab   Result Value Ref Range    Specimen Description .NASOPHARYNGEAL SWAB     Source .NASOPHARYNGEAL SWAB     SARS-CoV-2 RNA, RT PCR Not Detected Not Detected    Influenza A Not Detected Not Detected    Influenza B Not Detected Not Detected   RSV Detection    Specimen: Nasopharyngeal Swab   Result Value Ref Range    Source .NASOPHARYNGEAL SWAB     RSV by PCR Not Detected Not Detected   CBC with Auto Differential   Result Value Ref Range    WBC 8.3 4.5 - 11.5 k/uL    RBC 3.90 3.50 - 5.50 m/uL    Hemoglobin 12.2 11.5 - 15.5 g/dL    Hematocrit 36.9 34.0 - 48.0 %    MCV 94.6 80.0 - 99.9 fL    MCH 31.3 26.0 - 35.0 pg    MCHC 33.1 32.0 - 34.5 g/dL    RDW 14.1 11.5 - 15.0 %    Platelets 257 130 - 450 k/uL    MPV 10.1 7.0 - 12.0 fL    Neutrophils % 72 43.0 - 80.0 %

## 2025-05-04 NOTE — ED NOTES
Report attempt to the 5th floor RN. 5th floor cannot take pt @ this time due to all of the nurses having 5 patients and being maxed. Can possibly take pt @ 7pm.

## 2025-05-04 NOTE — ED PROVIDER NOTES
[facility]  EMERGENCY DEPARTMENT ENCOUNTER      Pt Name: Ruddy Llanes  MRN: [id number]  Birthdate 1950  Date of evaluation: 5/4/2025  Provider: Khai Franco MD  PCP: Nehemias Ayala DO  Note Started: 10:56 AM EDT 5/4/25    CHIEF COMPLAINT       Chief Complaint   Patient presents with    Chest Pain     Pt presents to ED due to chest pain x1 week       HISTORY OF PRESENT ILLNESS: 1 or more Elements   History From: Patient  Limitations to history : None    Ruddy Llanes is a 74 y.o. female who presents BIBEMS from home with complaints of midsternal burning chest pain radiating across front of the chest ongoing for the past 3 to 4 days.  Associated with cough productive of yellow mucus, and increased shortness of breath on exertion.  Does use 2 L of oxygen at baseline for history of COPD/emphysema.  Endorses history of cardiac catheterization with stent placement.  Denies abdominal pain, nausea or vomiting.  Endorses some generalized fatigue.  Also associated with some nausea this morning.  Denies objective fevers.  Reports she feels generally unwell.  Currently denies nausea, vomiting, objective fevers, dizziness, dysuria.    Nursing Notes were all reviewed and agreed with or any disagreements were addressed in the HPI.    REVIEW OF SYSTEMS :    Positives and Pertinent negatives as per HPI.     PAST MEDICAL HISTORY/Chronic Conditions Affecting Care    has a past medical history of Anxiety, Back pain, chronic, CAD (coronary artery disease), Chronic obstructive pulmonary disease (HCC) (3/20/2022), Community acquired bacterial pneumonia (6/10/2023), Depression, History of cardiovascular stress test (1-), blood clots, Hyperlipemia, Hypertension, and Pancreatitis.     SURGICAL HISTORY     Past Surgical History:   Procedure Laterality Date    BACK SURGERY      CORONARY ANGIOPLASTY WITH STENT PLACEMENT      ECHO COMPL W DOP COLOR FLOW  1/10/2013         HAND SURGERY Right 10/12/2019    HAND    Problem Relation Age of Onset    Cancer Brother         Prostate    Other Son         Sarcoma    Cancer Brother     Cancer Sister         SOCIAL HISTORY       Social History     Tobacco Use    Smoking status: Every Day     Current packs/day: 0.50     Average packs/day: 0.5 packs/day for 58.3 years (29.2 ttl pk-yrs)     Types: Cigarettes     Start date: 1/1/1967    Smokeless tobacco: Never   Vaping Use    Vaping status: Never Used   Substance Use Topics    Alcohol use: Not Currently    Drug use: No       SCREENINGS        [place] Coma Scale  Eye Opening: Spontaneous  Best Verbal Response: Oriented  Best Motor Response: Obeys commands  Gabriela Coma Scale Score: 15                CIWA Assessment  BP: (!) 108/46  Pulse: 71           PHYSICAL EXAM  1 or more Elements     ED Triage Vitals   BP Systolic BP Percentile Diastolic BP Percentile Temp Temp src Pulse Resp SpO2   -- -- -- -- -- -- -- --      Height Weight         -- --           Constitutional/General: Alert and oriented x3  Head: Normocephalic and atraumatic  Eyes: conjunctiva normal, sclera non icteric  ENT:  Oropharynx clear  Neck: Supple, full ROM, no stridor, no meningeal signs  Respiratory: Lungs diminished bilaterally with mild increased work of breathing  Cardiovascular:  Regular rate. 2+ distal pulses. Equal extremity pulses.   GI:  Abdomen Soft, Non tender, Non distended.  No rebound, guarding, or rigidity. No pulsatile masses.  Musculoskeletal: Moves all extremities x 4. Capillary refill <3 seconds  Integument: skin warm and dry.   Neurologic: GCS 15, no focal deficits, symmetric strength 5/5 in the upper and lower extremities bilaterally  Psychiatric: Normal Affect    DIAGNOSTIC RESULTS   LABS:    Labs Reviewed   COMPREHENSIVE METABOLIC PANEL - Abnormal; Notable for the following components:       Result Value    Chloride 109 (*)     CO2 21 (*)     All other components within normal limits   BRAIN NATRIURETIC PEPTIDE - Abnormal; Notable for the

## 2025-05-05 LAB
ALBUMIN SERPL-MCNC: 3.9 G/DL (ref 3.5–5.2)
ALP SERPL-CCNC: 67 U/L (ref 35–104)
ALT SERPL-CCNC: 9 U/L (ref 0–32)
AMPHET UR QL SCN: NEGATIVE
ANION GAP SERPL CALCULATED.3IONS-SCNC: 8 MMOL/L (ref 7–16)
AST SERPL-CCNC: 11 U/L (ref 0–31)
BARBITURATES UR QL SCN: NEGATIVE
BASOPHILS # BLD: 0.02 K/UL (ref 0–0.2)
BASOPHILS NFR BLD: 0 % (ref 0–2)
BENZODIAZ UR QL: NEGATIVE
BILIRUB SERPL-MCNC: 0.2 MG/DL (ref 0–1.2)
BUN SERPL-MCNC: 11 MG/DL (ref 6–23)
BUPRENORPHINE UR QL: POSITIVE
CALCIUM SERPL-MCNC: 9.1 MG/DL (ref 8.6–10.2)
CANNABINOIDS UR QL SCN: NEGATIVE
CHLORIDE SERPL-SCNC: 107 MMOL/L (ref 98–107)
CO2 SERPL-SCNC: 23 MMOL/L (ref 22–29)
COCAINE UR QL SCN: NEGATIVE
CREAT SERPL-MCNC: 0.5 MG/DL (ref 0.5–1)
ECHO BSA: 1.45 M2
EOSINOPHIL # BLD: 0 K/UL (ref 0.05–0.5)
EOSINOPHILS RELATIVE PERCENT: 0 % (ref 0–6)
ERYTHROCYTE [DISTWIDTH] IN BLOOD BY AUTOMATED COUNT: 14.1 % (ref 11.5–15)
FENTANYL UR QL: NEGATIVE
GFR, ESTIMATED: >90 ML/MIN/1.73M2
GLUCOSE SERPL-MCNC: 120 MG/DL (ref 74–99)
HCT VFR BLD AUTO: 33.6 % (ref 34–48)
HGB BLD-MCNC: 11.2 G/DL (ref 11.5–15.5)
IMM GRANULOCYTES # BLD AUTO: 0.05 K/UL (ref 0–0.58)
IMM GRANULOCYTES NFR BLD: 0 % (ref 0–5)
INR PPP: 1
LYMPHOCYTES NFR BLD: 1.04 K/UL (ref 1.5–4)
LYMPHOCYTES RELATIVE PERCENT: 8 % (ref 20–42)
MCH RBC QN AUTO: 31.6 PG (ref 26–35)
MCHC RBC AUTO-ENTMCNC: 33.3 G/DL (ref 32–34.5)
MCV RBC AUTO: 94.9 FL (ref 80–99.9)
METHADONE UR QL: NEGATIVE
MONOCYTES NFR BLD: 0.18 K/UL (ref 0.1–0.95)
MONOCYTES NFR BLD: 1 % (ref 2–12)
NEUTROPHILS NFR BLD: 90 % (ref 43–80)
NEUTS SEG NFR BLD: 11.18 K/UL (ref 1.8–7.3)
OPIATES UR QL SCN: NEGATIVE
OXYCODONE UR QL SCN: NEGATIVE
PCP UR QL SCN: NEGATIVE
PLATELET # BLD AUTO: 283 K/UL (ref 130–450)
PMV BLD AUTO: 9.9 FL (ref 7–12)
POTASSIUM SERPL-SCNC: 3.8 MMOL/L (ref 3.5–5)
PROT SERPL-MCNC: 6 G/DL (ref 6.4–8.3)
PROTHROMBIN TIME: 10.8 SEC (ref 9.3–12.4)
RBC # BLD AUTO: 3.54 M/UL (ref 3.5–5.5)
SODIUM SERPL-SCNC: 138 MMOL/L (ref 132–146)
TEST INFORMATION: ABNORMAL
WBC OTHER # BLD: 12.5 K/UL (ref 4.5–11.5)

## 2025-05-05 PROCEDURE — 80053 COMPREHEN METABOLIC PANEL: CPT

## 2025-05-05 PROCEDURE — 6360000002 HC RX W HCPCS: Performed by: FAMILY MEDICINE

## 2025-05-05 PROCEDURE — 6370000000 HC RX 637 (ALT 250 FOR IP): Performed by: NURSE PRACTITIONER

## 2025-05-05 PROCEDURE — 2700000000 HC OXYGEN THERAPY PER DAY

## 2025-05-05 PROCEDURE — 1200000000 HC SEMI PRIVATE

## 2025-05-05 PROCEDURE — 93458 L HRT ARTERY/VENTRICLE ANGIO: CPT | Performed by: INTERNAL MEDICINE

## 2025-05-05 PROCEDURE — 6370000000 HC RX 637 (ALT 250 FOR IP): Performed by: INTERNAL MEDICINE

## 2025-05-05 PROCEDURE — 4A023N7 MEASUREMENT OF CARDIAC SAMPLING AND PRESSURE, LEFT HEART, PERCUTANEOUS APPROACH: ICD-10-PCS | Performed by: INTERNAL MEDICINE

## 2025-05-05 PROCEDURE — 85025 COMPLETE CBC W/AUTO DIFF WBC: CPT

## 2025-05-05 PROCEDURE — 6360000002 HC RX W HCPCS: Performed by: INTERNAL MEDICINE

## 2025-05-05 PROCEDURE — C1894 INTRO/SHEATH, NON-LASER: HCPCS | Performed by: INTERNAL MEDICINE

## 2025-05-05 PROCEDURE — 7100000001 HC PACU RECOVERY - ADDTL 15 MIN: Performed by: INTERNAL MEDICINE

## 2025-05-05 PROCEDURE — 2709999900 HC NON-CHARGEABLE SUPPLY: Performed by: INTERNAL MEDICINE

## 2025-05-05 PROCEDURE — 2500000003 HC RX 250 WO HCPCS: Performed by: INTERNAL MEDICINE

## 2025-05-05 PROCEDURE — 7100000000 HC PACU RECOVERY - FIRST 15 MIN: Performed by: INTERNAL MEDICINE

## 2025-05-05 PROCEDURE — B2151ZZ FLUOROSCOPY OF LEFT HEART USING LOW OSMOLAR CONTRAST: ICD-10-PCS | Performed by: INTERNAL MEDICINE

## 2025-05-05 PROCEDURE — 2580000003 HC RX 258: Performed by: INTERNAL MEDICINE

## 2025-05-05 PROCEDURE — 6370000000 HC RX 637 (ALT 250 FOR IP): Performed by: FAMILY MEDICINE

## 2025-05-05 PROCEDURE — 027034Z DILATION OF CORONARY ARTERY, ONE ARTERY WITH DRUG-ELUTING INTRALUMINAL DEVICE, PERCUTANEOUS APPROACH: ICD-10-PCS | Performed by: INTERNAL MEDICINE

## 2025-05-05 PROCEDURE — C1769 GUIDE WIRE: HCPCS | Performed by: INTERNAL MEDICINE

## 2025-05-05 PROCEDURE — 94640 AIRWAY INHALATION TREATMENT: CPT

## 2025-05-05 PROCEDURE — C1887 CATHETER, GUIDING: HCPCS | Performed by: INTERNAL MEDICINE

## 2025-05-05 PROCEDURE — 85610 PROTHROMBIN TIME: CPT

## 2025-05-05 PROCEDURE — 92928 PRQ TCAT PLMT NTRAC ST 1 LES: CPT | Performed by: INTERNAL MEDICINE

## 2025-05-05 PROCEDURE — 99232 SBSQ HOSP IP/OBS MODERATE 35: CPT | Performed by: FAMILY MEDICINE

## 2025-05-05 PROCEDURE — 36415 COLL VENOUS BLD VENIPUNCTURE: CPT

## 2025-05-05 PROCEDURE — 99152 MOD SED SAME PHYS/QHP 5/>YRS: CPT | Performed by: INTERNAL MEDICINE

## 2025-05-05 PROCEDURE — 85347 COAGULATION TIME ACTIVATED: CPT

## 2025-05-05 PROCEDURE — B2111ZZ FLUOROSCOPY OF MULTIPLE CORONARY ARTERIES USING LOW OSMOLAR CONTRAST: ICD-10-PCS | Performed by: INTERNAL MEDICINE

## 2025-05-05 PROCEDURE — C1874 STENT, COATED/COV W/DEL SYS: HCPCS | Performed by: INTERNAL MEDICINE

## 2025-05-05 PROCEDURE — 99153 MOD SED SAME PHYS/QHP EA: CPT | Performed by: INTERNAL MEDICINE

## 2025-05-05 PROCEDURE — 6360000004 HC RX CONTRAST MEDICATION: Performed by: INTERNAL MEDICINE

## 2025-05-05 PROCEDURE — C1725 CATH, TRANSLUMIN NON-LASER: HCPCS | Performed by: INTERNAL MEDICINE

## 2025-05-05 DEVICE — STENT ONYXNG35038UX ONYX 3.50X38RX
Type: IMPLANTABLE DEVICE | Status: FUNCTIONAL
Brand: ONYX FRONTIER™

## 2025-05-05 RX ORDER — TRAMADOL HYDROCHLORIDE 50 MG/1
100 TABLET ORAL EVERY 6 HOURS PRN
Status: DISCONTINUED | OUTPATIENT
Start: 2025-05-05 | End: 2025-05-06 | Stop reason: HOSPADM

## 2025-05-05 RX ORDER — CLOPIDOGREL BISULFATE 75 MG/1
75 TABLET ORAL DAILY
Status: DISCONTINUED | OUTPATIENT
Start: 2025-05-06 | End: 2025-05-06 | Stop reason: HOSPADM

## 2025-05-05 RX ORDER — MIDAZOLAM HYDROCHLORIDE 1 MG/ML
INJECTION, SOLUTION INTRAMUSCULAR; INTRAVENOUS PRN
Status: DISCONTINUED | OUTPATIENT
Start: 2025-05-05 | End: 2025-05-05 | Stop reason: HOSPADM

## 2025-05-05 RX ORDER — METOPROLOL SUCCINATE 25 MG/1
25 TABLET, EXTENDED RELEASE ORAL DAILY
Status: DISCONTINUED | OUTPATIENT
Start: 2025-05-05 | End: 2025-05-06

## 2025-05-05 RX ORDER — SODIUM CHLORIDE 9 MG/ML
INJECTION, SOLUTION INTRAVENOUS CONTINUOUS PRN
Status: COMPLETED | OUTPATIENT
Start: 2025-05-05 | End: 2025-05-05

## 2025-05-05 RX ORDER — METOCLOPRAMIDE HYDROCHLORIDE 5 MG/ML
10 INJECTION INTRAMUSCULAR; INTRAVENOUS ONCE
Status: COMPLETED | OUTPATIENT
Start: 2025-05-05 | End: 2025-05-05

## 2025-05-05 RX ORDER — IOPAMIDOL 755 MG/ML
INJECTION, SOLUTION INTRAVASCULAR PRN
Status: DISCONTINUED | OUTPATIENT
Start: 2025-05-05 | End: 2025-05-05 | Stop reason: HOSPADM

## 2025-05-05 RX ORDER — LOSARTAN POTASSIUM 25 MG/1
12.5 TABLET ORAL DAILY
Status: DISCONTINUED | OUTPATIENT
Start: 2025-05-06 | End: 2025-05-06 | Stop reason: HOSPADM

## 2025-05-05 RX ORDER — LOSARTAN POTASSIUM 50 MG/1
25 TABLET ORAL DAILY
Status: DISCONTINUED | OUTPATIENT
Start: 2025-05-05 | End: 2025-05-05

## 2025-05-05 RX ADMIN — ACETAMINOPHEN 650 MG: 325 TABLET ORAL at 12:12

## 2025-05-05 RX ADMIN — METOPROLOL SUCCINATE 25 MG: 25 TABLET, EXTENDED RELEASE ORAL at 12:12

## 2025-05-05 RX ADMIN — BUSPIRONE HYDROCHLORIDE 5 MG: 5 TABLET ORAL at 20:33

## 2025-05-05 RX ADMIN — BUPRENORPHINE HYDROCHLORIDE AND NALOXONE HYDROCHLORIDE DIHYDRATE 1 TABLET: 8; 2 TABLET SUBLINGUAL at 08:32

## 2025-05-05 RX ADMIN — BUDESONIDE 500 MCG: 0.5 SUSPENSION RESPIRATORY (INHALATION) at 06:24

## 2025-05-05 RX ADMIN — ENOXAPARIN SODIUM 30 MG: 100 INJECTION SUBCUTANEOUS at 08:31

## 2025-05-05 RX ADMIN — BUSPIRONE HYDROCHLORIDE 5 MG: 5 TABLET ORAL at 15:14

## 2025-05-05 RX ADMIN — ASPIRIN 81 MG: 81 TABLET, CHEWABLE ORAL at 08:32

## 2025-05-05 RX ADMIN — TRAMADOL HYDROCHLORIDE 100 MG: 50 TABLET, COATED ORAL at 12:12

## 2025-05-05 RX ADMIN — BUPRENORPHINE HYDROCHLORIDE AND NALOXONE HYDROCHLORIDE DIHYDRATE 1 TABLET: 8; 2 TABLET SUBLINGUAL at 15:14

## 2025-05-05 RX ADMIN — BUPRENORPHINE HYDROCHLORIDE AND NALOXONE HYDROCHLORIDE DIHYDRATE 1 TABLET: 8; 2 TABLET SUBLINGUAL at 20:33

## 2025-05-05 RX ADMIN — IPRATROPIUM BROMIDE AND ALBUTEROL SULFATE 1 DOSE: .5; 2.5 SOLUTION RESPIRATORY (INHALATION) at 14:08

## 2025-05-05 RX ADMIN — BUSPIRONE HYDROCHLORIDE 5 MG: 5 TABLET ORAL at 08:32

## 2025-05-05 RX ADMIN — ONDANSETRON 4 MG: 2 INJECTION, SOLUTION INTRAMUSCULAR; INTRAVENOUS at 08:43

## 2025-05-05 RX ADMIN — PANTOPRAZOLE SODIUM 40 MG: 40 TABLET, DELAYED RELEASE ORAL at 06:05

## 2025-05-05 RX ADMIN — IPRATROPIUM BROMIDE AND ALBUTEROL SULFATE 1 DOSE: .5; 2.5 SOLUTION RESPIRATORY (INHALATION) at 06:24

## 2025-05-05 RX ADMIN — ATORVASTATIN CALCIUM 40 MG: 40 TABLET, FILM COATED ORAL at 20:33

## 2025-05-05 RX ADMIN — SODIUM CHLORIDE, PRESERVATIVE FREE 10 ML: 5 INJECTION INTRAVENOUS at 20:33

## 2025-05-05 RX ADMIN — AZITHROMYCIN DIHYDRATE 500 MG: 500 INJECTION, POWDER, LYOPHILIZED, FOR SOLUTION INTRAVENOUS at 16:18

## 2025-05-05 RX ADMIN — WATER 1000 MG: 1 INJECTION INTRAMUSCULAR; INTRAVENOUS; SUBCUTANEOUS at 16:13

## 2025-05-05 RX ADMIN — METOCLOPRAMIDE HYDROCHLORIDE 10 MG: 5 INJECTION INTRAMUSCULAR; INTRAVENOUS at 12:17

## 2025-05-05 RX ADMIN — METHYLPREDNISOLONE SODIUM SUCCINATE 40 MG: 40 INJECTION, POWDER, LYOPHILIZED, FOR SOLUTION INTRAMUSCULAR; INTRAVENOUS at 08:32

## 2025-05-05 RX ADMIN — ACETAMINOPHEN 650 MG: 325 TABLET ORAL at 06:05

## 2025-05-05 RX ADMIN — SODIUM CHLORIDE, PRESERVATIVE FREE 10 ML: 5 INJECTION INTRAVENOUS at 08:32

## 2025-05-05 RX ADMIN — BUDESONIDE 500 MCG: 0.5 SUSPENSION RESPIRATORY (INHALATION) at 18:24

## 2025-05-05 RX ADMIN — IPRATROPIUM BROMIDE AND ALBUTEROL SULFATE 1 DOSE: .5; 2.5 SOLUTION RESPIRATORY (INHALATION) at 18:24

## 2025-05-05 ASSESSMENT — PAIN SCALES - GENERAL
PAINLEVEL_OUTOF10: 8
PAINLEVEL_OUTOF10: 0
PAINLEVEL_OUTOF10: 8

## 2025-05-05 ASSESSMENT — PAIN DESCRIPTION - LOCATION: LOCATION: CHEST

## 2025-05-05 NOTE — PROGRESS NOTES
I received report from cath lab they stated pt had 18cc in the TR band. I removed 11 before the TR band was empty. Pt radial was free of swelling not bleeding. Pt having no pain

## 2025-05-05 NOTE — PROGRESS NOTES
Patient came down to special procedures for cardiac catheterization with Dr. Mayo.       Total amount of sedation medication given during procedure: 2 mg of IV Versed   18 cc of air in right Radial band and Radial pulse is +2.  Right wrist to remain straight, patient not to bend wrist.    1052 - 15 minute post procedure VS HR 77, RR 18, /56, Spo2 92 % with 5L nassal cannula.     Right hand color is within normal limits,  pulse of +2, no numbness or tingling     Patient is alert and oriented x 4     nurse to nurse called, spoke with Arik RN, nurse notified of above information.  Nurse assumed post sedation vital signs    Patient transported back to the [facility].

## 2025-05-05 NOTE — PROGRESS NOTES
Pharmacist Review and Automatic Dose Adjustment of Prophylactic Enoxaparin         The reviewing pharmacist has made an adjustment to the ordered enoxaparin dose or converted to UFH per the approved [facility] protocol and table as identified below.        Ruddy Llanes is a 74 y.o. female.     Recent Labs     05/04/25  1130   CREATININE 0.5       Estimated Creatinine Clearance: 74 mL/min (based on SCr of 0.5 mg/dL).    Recent Labs     05/04/25  1130   HGB 12.2   HCT 36.9        No results for input(s): \"INR\" in the last 72 hours.    Height:   Ht Readings from Last 1 Encounters:   05/04/25 1.651 m (5' 5\")     Weight:  Wt Readings from Last 1 Encounters:   05/04/25 46 kg (101 lb 6.4 oz)               Plan: Based upon the patient's weight and renal function    Ordered: Enoxaparin 40mg SUBQ Daily    Changed/converted to    New Order: Enoxaparin 30mg SUBQ Daily      Thank you,  Edgar Doll [facility]  5/4/2025, 11:07 PM

## 2025-05-05 NOTE — PROGRESS NOTES
[facility] Hospitalist Progress Note    Admitting Date and Time: 5/4/2025 10:51 AM  Admit Dx: Shortness of breath [R06.02]  COPD exacerbation (HCC) [J44.1]  Chest pain, unspecified type [R07.9]      Assessment:    Principal Problem:    COPD exacerbation (HCC)  Active Problems:    Coronary artery disease of native artery of native heart with stable angina pectoris    Hyperlipidemia    Anxiety    Chronic respiratory failure with hypoxia (HCC)    Drug abuse (HCC)    Chest pain  Resolved Problems:    * No resolved hospital problems. *      Plan:  5/4/2025  Impression:  Principal Problem:    COPD exacerbation (HCC)  Active Problems:    Coronary artery disease of native artery of native heart with stable angina pectoris    Hyperlipidemia    Anxiety    Chronic respiratory failure with hypoxia (HCC)    Drug abuse (HCC)  Resolved Problems:    * No resolved hospital problems. *        My findings/plan include:  Chest pain has been getting more consistent with exercise or walking with going up and down the steps.  Patient did have 2 stents in 2008 and she has not followed with the Dr. Mayo except once right after the stent.  Then no other follow-ups.  Wound consult Dr. Mayo will get a 2D echo.  Continue her aspirin daily.  COPD exacerbation will place her on Solu-Medrol and aerosol treatments , Zithromax and Rocephin.  On oxygen she is already on oxygen at home at 3 L baseline.  Tobacco abuse patient still smokes half a pack a day.  I discussed with her about quitting smoking.  Will start her on nicotine patch.  Drug abuse she has been on Suboxone for a long time.  Will restart with the same dose.  Will check urine drug screen.  Anxiety I will start her on BuSpar continue her Cymbalta.  Lovenox for DVT/prophylaxis  Full code  Anticipate discharge home in 2 to 3 days    5/5/2025  --on rocephin and azithromycin for copd exacerbation  --scheduled and prn bronchodilators  --on iv steroids, wean as able  --encourage

## 2025-05-06 ENCOUNTER — APPOINTMENT (OUTPATIENT)
Age: 75
DRG: 175 | End: 2025-05-06
Attending: INTERNAL MEDICINE
Payer: COMMERCIAL

## 2025-05-06 VITALS
OXYGEN SATURATION: 94 % | RESPIRATION RATE: 18 BRPM | HEART RATE: 71 BPM | TEMPERATURE: 97.9 F | DIASTOLIC BLOOD PRESSURE: 43 MMHG | BODY MASS INDEX: 16.83 KG/M2 | WEIGHT: 101 LBS | HEIGHT: 65 IN | SYSTOLIC BLOOD PRESSURE: 99 MMHG

## 2025-05-06 LAB
ACTIVATED CLOTTING TIME, LOW RANGE: 361 SEC
ECHO AO ASC DIAM: 2.6 CM
ECHO AO ASCENDING AORTA INDEX: 1.76 CM/M2
ECHO AV AREA PEAK VELOCITY: 1.8 CM2
ECHO AV AREA PEAK VELOCITY: 1.9 CM2
ECHO AV AREA PEAK VELOCITY: 2 CM2
ECHO AV AREA PEAK VELOCITY: 2 CM2
ECHO AV AREA VTI: 2.1 CM2
ECHO AV AREA/BSA VTI: 1.4 CM2/M2
ECHO AV CUSP MM: 1.5 CM
ECHO AV MEAN GRADIENT: 7 MMHG
ECHO AV MEAN VELOCITY: 1.2 M/S
ECHO AV PEAK GRADIENT: 11 MMHG
ECHO AV PEAK GRADIENT: 11 MMHG
ECHO AV PEAK VELOCITY: 1.6 M/S
ECHO AV PEAK VELOCITY: 1.7 M/S
ECHO AV VTI: 35.5 CM
ECHO BSA: 1.45 M2
ECHO EST RA PRESSURE: 3 MMHG
ECHO LA DIAMETER INDEX: 2.43 CM/M2
ECHO LA DIAMETER: 3.6 CM
ECHO LA VOL A-L A2C: 61 ML (ref 22–52)
ECHO LA VOL A-L A4C: 68 ML (ref 22–52)
ECHO LA VOL BP: 62 ML (ref 22–52)
ECHO LA VOL MOD A2C: 58 ML (ref 22–52)
ECHO LA VOL MOD A4C: 61 ML (ref 22–52)
ECHO LA VOL/BSA BIPLANE: 42 ML/M2 (ref 16–34)
ECHO LA VOLUME AREA LENGTH: 67 ML
ECHO LA VOLUME INDEX A-L A2C: 41 ML/M2 (ref 16–34)
ECHO LA VOLUME INDEX A-L A4C: 46 ML/M2 (ref 16–34)
ECHO LA VOLUME INDEX AREA LENGTH: 45 ML/M2 (ref 16–34)
ECHO LA VOLUME INDEX MOD A2C: 39 ML/M2 (ref 16–34)
ECHO LA VOLUME INDEX MOD A4C: 41 ML/M2 (ref 16–34)
ECHO LV EF PHYSICIAN: 50 %
ECHO LV FRACTIONAL SHORTENING: 26 % (ref 28–44)
ECHO LV INTERNAL DIMENSION DIASTOLE INDEX: 3.65 CM/M2
ECHO LV INTERNAL DIMENSION DIASTOLIC: 5.4 CM (ref 3.9–5.3)
ECHO LV INTERNAL DIMENSION SYSTOLIC INDEX: 2.7 CM/M2
ECHO LV INTERNAL DIMENSION SYSTOLIC: 4 CM
ECHO LV IVSD: 0.9 CM (ref 0.6–0.9)
ECHO LV IVSS: 1.3 CM
ECHO LV MASS 2D: 180.1 G (ref 67–162)
ECHO LV MASS INDEX 2D: 121.7 G/M2 (ref 43–95)
ECHO LV POSTERIOR WALL DIASTOLIC: 0.9 CM (ref 0.6–0.9)
ECHO LV POSTERIOR WALL SYSTOLIC: 1.2 CM
ECHO LV RELATIVE WALL THICKNESS RATIO: 0.33
ECHO LVOT AREA: 3.1 CM2
ECHO LVOT AV VTI INDEX: 0.68
ECHO LVOT DIAM: 2 CM
ECHO LVOT MEAN GRADIENT: 2 MMHG
ECHO LVOT PEAK GRADIENT: 5 MMHG
ECHO LVOT PEAK GRADIENT: 5 MMHG
ECHO LVOT PEAK VELOCITY: 1 M/S
ECHO LVOT PEAK VELOCITY: 1.1 M/S
ECHO LVOT STROKE VOLUME INDEX: 51.6 ML/M2
ECHO LVOT SV: 76.3 ML
ECHO LVOT VTI: 24.3 CM
ECHO MV "A" WAVE DURATION: 148.4 MSEC
ECHO MV A VELOCITY: 0.84 M/S
ECHO MV AREA PHT: 2.9 CM2
ECHO MV AREA VTI: 2.1 CM2
ECHO MV E DECELERATION TIME (DT): 188.8 MS
ECHO MV E VELOCITY: 0.75 M/S
ECHO MV E/A RATIO: 0.89
ECHO MV EROA PISA: 0.1 CM2
ECHO MV LVOT VTI INDEX: 1.49
ECHO MV MAX VELOCITY: 1.2 M/S
ECHO MV MEAN GRADIENT: 2 MMHG
ECHO MV MEAN VELOCITY: 0.7 M/S
ECHO MV PEAK GRADIENT: 6 MMHG
ECHO MV PRESSURE HALF TIME (PHT): 75.4 MS
ECHO MV REGURGITANT ALIASING (NYQUIST) VELOCITY: 18 CM/S
ECHO MV REGURGITANT RADIUS PISA: 0.55 CM
ECHO MV REGURGITANT VELOCITY PISA: 5.4 M/S
ECHO MV REGURGITANT VOLUME PISA: 13.28 ML
ECHO MV REGURGITANT VTIA: 209.7 CM
ECHO MV VTI: 36.1 CM
ECHO PV MAX VELOCITY: 1.1 M/S
ECHO PV MEAN GRADIENT: 2 MMHG
ECHO PV MEAN VELOCITY: 0.7 M/S
ECHO PV PEAK GRADIENT: 5 MMHG
ECHO PV VTI: 23.5 CM
ECHO RIGHT VENTRICULAR SYSTOLIC PRESSURE (RVSP): 24 MMHG
ECHO RV INTERNAL DIMENSION: 2 CM
ECHO RV LONGITUDINAL DIMENSION: 5.4 CM
ECHO RV MID DIMENSION: 2.9 CM
ECHO RVOT MEAN GRADIENT: 1 MMHG
ECHO RVOT PEAK GRADIENT: 1 MMHG
ECHO RVOT PEAK VELOCITY: 0.5 M/S
ECHO RVOT VTI: 12.5 CM
ECHO TV REGURGITANT MAX VELOCITY: 2.3 M/S
ECHO TV REGURGITANT PEAK GRADIENT: 21 MMHG

## 2025-05-06 PROCEDURE — 6360000002 HC RX W HCPCS: Performed by: INTERNAL MEDICINE

## 2025-05-06 PROCEDURE — 6370000000 HC RX 637 (ALT 250 FOR IP): Performed by: INTERNAL MEDICINE

## 2025-05-06 PROCEDURE — 94640 AIRWAY INHALATION TREATMENT: CPT

## 2025-05-06 PROCEDURE — 93306 TTE W/DOPPLER COMPLETE: CPT

## 2025-05-06 PROCEDURE — 6370000000 HC RX 637 (ALT 250 FOR IP): Performed by: NURSE PRACTITIONER

## 2025-05-06 PROCEDURE — 2580000003 HC RX 258: Performed by: INTERNAL MEDICINE

## 2025-05-06 PROCEDURE — 99239 HOSP IP/OBS DSCHRG MGMT >30: CPT | Performed by: FAMILY MEDICINE

## 2025-05-06 PROCEDURE — 2500000003 HC RX 250 WO HCPCS: Performed by: INTERNAL MEDICINE

## 2025-05-06 RX ORDER — NICOTINE 21 MG/24HR
1 PATCH, TRANSDERMAL 24 HOURS TRANSDERMAL DAILY
Qty: 30 PATCH | Refills: 3 | Status: SHIPPED | OUTPATIENT
Start: 2025-05-07

## 2025-05-06 RX ORDER — ACETAMINOPHEN 325 MG/1
650 TABLET ORAL EVERY 4 HOURS PRN
Status: DISCONTINUED | OUTPATIENT
Start: 2025-05-06 | End: 2025-05-06 | Stop reason: HOSPADM

## 2025-05-06 RX ORDER — SODIUM CHLORIDE 9 MG/ML
INJECTION, SOLUTION INTRAVENOUS PRN
Status: DISCONTINUED | OUTPATIENT
Start: 2025-05-06 | End: 2025-05-06 | Stop reason: HOSPADM

## 2025-05-06 RX ORDER — METOPROLOL SUCCINATE 25 MG/1
25 TABLET, EXTENDED RELEASE ORAL DAILY
Qty: 30 TABLET | Refills: 3 | Status: SHIPPED | OUTPATIENT
Start: 2025-05-07

## 2025-05-06 RX ORDER — PREDNISONE 10 MG/1
TABLET ORAL
Qty: 45 TABLET | Refills: 0 | Status: SHIPPED | OUTPATIENT
Start: 2025-05-06

## 2025-05-06 RX ORDER — SODIUM CHLORIDE 0.9 % (FLUSH) 0.9 %
5-40 SYRINGE (ML) INJECTION EVERY 12 HOURS SCHEDULED
Status: DISCONTINUED | OUTPATIENT
Start: 2025-05-06 | End: 2025-05-06 | Stop reason: HOSPADM

## 2025-05-06 RX ORDER — CLOPIDOGREL BISULFATE 75 MG/1
75 TABLET ORAL DAILY
Qty: 30 TABLET | Refills: 3 | Status: SHIPPED | OUTPATIENT
Start: 2025-05-07

## 2025-05-06 RX ORDER — AZITHROMYCIN 500 MG/1
500 TABLET, FILM COATED ORAL DAILY
Qty: 3 TABLET | Refills: 0 | Status: SHIPPED | OUTPATIENT
Start: 2025-05-06 | End: 2025-05-09

## 2025-05-06 RX ORDER — TRAMADOL HYDROCHLORIDE 50 MG/1
100 TABLET ORAL EVERY 6 HOURS PRN
Qty: 28 TABLET | Refills: 0 | Status: SHIPPED | OUTPATIENT
Start: 2025-05-06 | End: 2025-05-13

## 2025-05-06 RX ORDER — PANTOPRAZOLE SODIUM 40 MG/1
40 TABLET, DELAYED RELEASE ORAL
Qty: 30 TABLET | Refills: 3 | Status: SHIPPED | OUTPATIENT
Start: 2025-05-07

## 2025-05-06 RX ORDER — NITROGLYCERIN 0.4 MG/1
0.4 TABLET SUBLINGUAL EVERY 5 MIN PRN
Qty: 25 TABLET | Refills: 3 | Status: SHIPPED | OUTPATIENT
Start: 2025-05-06

## 2025-05-06 RX ORDER — BUSPIRONE HYDROCHLORIDE 5 MG/1
5 TABLET ORAL 3 TIMES DAILY
Qty: 90 TABLET | Refills: 3 | Status: SHIPPED | OUTPATIENT
Start: 2025-05-06

## 2025-05-06 RX ORDER — LOSARTAN POTASSIUM 25 MG/1
12.5 TABLET ORAL DAILY
Qty: 15 TABLET | Refills: 3 | Status: SHIPPED | OUTPATIENT
Start: 2025-05-06

## 2025-05-06 RX ORDER — METOPROLOL SUCCINATE 25 MG/1
12.5 TABLET, EXTENDED RELEASE ORAL DAILY
Status: DISCONTINUED | OUTPATIENT
Start: 2025-05-07 | End: 2025-05-06 | Stop reason: HOSPADM

## 2025-05-06 RX ORDER — CEFDINIR 300 MG/1
300 CAPSULE ORAL 2 TIMES DAILY
Qty: 10 CAPSULE | Refills: 0 | Status: SHIPPED | OUTPATIENT
Start: 2025-05-06 | End: 2025-05-11

## 2025-05-06 RX ORDER — SODIUM CHLORIDE 0.9 % (FLUSH) 0.9 %
5-40 SYRINGE (ML) INJECTION PRN
Status: DISCONTINUED | OUTPATIENT
Start: 2025-05-06 | End: 2025-05-06 | Stop reason: HOSPADM

## 2025-05-06 RX ADMIN — METHYLPREDNISOLONE SODIUM SUCCINATE 40 MG: 40 INJECTION, POWDER, LYOPHILIZED, FOR SOLUTION INTRAMUSCULAR; INTRAVENOUS at 08:21

## 2025-05-06 RX ADMIN — BUSPIRONE HYDROCHLORIDE 5 MG: 5 TABLET ORAL at 08:15

## 2025-05-06 RX ADMIN — SODIUM CHLORIDE, PRESERVATIVE FREE 10 ML: 5 INJECTION INTRAVENOUS at 08:24

## 2025-05-06 RX ADMIN — ASPIRIN 81 MG: 81 TABLET, CHEWABLE ORAL at 08:20

## 2025-05-06 RX ADMIN — IPRATROPIUM BROMIDE AND ALBUTEROL SULFATE 1 DOSE: .5; 2.5 SOLUTION RESPIRATORY (INHALATION) at 14:36

## 2025-05-06 RX ADMIN — BUPRENORPHINE HYDROCHLORIDE AND NALOXONE HYDROCHLORIDE DIHYDRATE 1 TABLET: 8; 2 TABLET SUBLINGUAL at 14:59

## 2025-05-06 RX ADMIN — WATER 1000 MG: 1 INJECTION INTRAMUSCULAR; INTRAVENOUS; SUBCUTANEOUS at 15:00

## 2025-05-06 RX ADMIN — IPRATROPIUM BROMIDE AND ALBUTEROL SULFATE 1 DOSE: .5; 2.5 SOLUTION RESPIRATORY (INHALATION) at 10:27

## 2025-05-06 RX ADMIN — AZITHROMYCIN DIHYDRATE 500 MG: 500 INJECTION, POWDER, LYOPHILIZED, FOR SOLUTION INTRAVENOUS at 15:16

## 2025-05-06 RX ADMIN — BUDESONIDE 500 MCG: 0.5 SUSPENSION RESPIRATORY (INHALATION) at 04:19

## 2025-05-06 RX ADMIN — CLOPIDOGREL BISULFATE 75 MG: 75 TABLET, FILM COATED ORAL at 08:16

## 2025-05-06 RX ADMIN — BUDESONIDE 500 MCG: 0.5 SUSPENSION RESPIRATORY (INHALATION) at 17:57

## 2025-05-06 RX ADMIN — IPRATROPIUM BROMIDE AND ALBUTEROL SULFATE 1 DOSE: .5; 2.5 SOLUTION RESPIRATORY (INHALATION) at 04:19

## 2025-05-06 RX ADMIN — ACETAMINOPHEN 650 MG: 325 TABLET ORAL at 07:41

## 2025-05-06 RX ADMIN — BUSPIRONE HYDROCHLORIDE 5 MG: 5 TABLET ORAL at 14:59

## 2025-05-06 RX ADMIN — METOPROLOL SUCCINATE 25 MG: 25 TABLET, EXTENDED RELEASE ORAL at 08:17

## 2025-05-06 RX ADMIN — IPRATROPIUM BROMIDE AND ALBUTEROL SULFATE 1 DOSE: .5; 2.5 SOLUTION RESPIRATORY (INHALATION) at 17:57

## 2025-05-06 RX ADMIN — ENOXAPARIN SODIUM 30 MG: 100 INJECTION SUBCUTANEOUS at 08:19

## 2025-05-06 RX ADMIN — ONDANSETRON 4 MG: 4 TABLET, ORALLY DISINTEGRATING ORAL at 06:06

## 2025-05-06 RX ADMIN — BUPRENORPHINE HYDROCHLORIDE AND NALOXONE HYDROCHLORIDE DIHYDRATE 1 TABLET: 8; 2 TABLET SUBLINGUAL at 08:17

## 2025-05-06 RX ADMIN — PANTOPRAZOLE SODIUM 40 MG: 40 TABLET, DELAYED RELEASE ORAL at 07:42

## 2025-05-06 ASSESSMENT — PAIN DESCRIPTION - LOCATION: LOCATION: HEAD

## 2025-05-06 ASSESSMENT — PAIN SCALES - GENERAL: PAINLEVEL_OUTOF10: 4

## 2025-05-06 NOTE — PROGRESS NOTES
Right radial splint removed, bandaid removed, area all soft denies pain. New bandaid applied instructed limit use of arm/ hand. Radial pulse 2+. Will continue to monitor.

## 2025-05-06 NOTE — DISCHARGE SUMMARY
Discharge Summary  Patient ID:  Ruddy Llanes  [id number]  74 y.o. 1950 female  Nehemias Ayala DO        Admit date: 5/4/2025    Discharge date and time:  5/6/2025  10:55 AM      Activity level: Baseline as tolerated  Diet: Regular diet as tolerated  Labs: None  Disposition: Home  Condition on Discharge: Stable  DME: None    Admit Diagnoses:   Patient Active Problem List   Diagnosis    Chronic back pain    Depression    Coronary artery disease of native artery of native heart with stable angina pectoris    Bronchiectasis without complication (HCC)    Hyperlipidemia    Chronic obstructive pulmonary disease (HCC)    Severe protein-calorie malnutrition    COPD exacerbation (HCC)    COPD with acute exacerbation (HCC)    Anxiety    Chronic respiratory failure with hypoxia (HCC)    Drug abuse (HCC)    Chest pain       Discharge Diagnoses: Principal Problem:    COPD exacerbation (HCC)  Active Problems:    Coronary artery disease of native artery of native heart with stable angina pectoris    Hyperlipidemia    Anxiety    Chronic respiratory failure with hypoxia (HCC)    Drug abuse (HCC)    Chest pain  Resolved Problems:    * No resolved hospital problems. *      Consults:  IP CONSULT TO CARDIOLOGY  IP CONSULT TO CARDIAC REHAB  IP CONSULT TO CARDIAC REHAB  IP CONSULT TO CARDIAC REHAB    Procedures:   Cardiac catheterization 5/5/2025    Hospital Course: Ruddy Llanes is a 74 y.o. female with a history of COPD with chronic hypoxia, HTN, HLD, anxiety and depression who presents with Chest Pain (Pt presents to ED due to chest pain x1 week)  Pt reports burning & pain to chest for the last week. Pain is aggravated by exertion and had been alleviated by rest until this morning when pain persisted at rest. She also reports nausea, 1 episode of emesis this am and worsening dyspnea over the last few days. She states that she has had ongoing cough since having pna a few months ago, but the cough has also worsened

## 2025-05-06 NOTE — CARE COORDINATION
Case Management Assessment  Initial Evaluation    Date/Time of Evaluation: 5/6/2025 2:04 PM  Assessment Completed by: Brianda Biggs RN    If patient is discharged prior to next notation, then this note serves as note for discharge by case management.    Patient Name: Ruddy Llanes                   YOB: 1950  Diagnosis: Shortness of breath [R06.02]  COPD exacerbation (HCC) [J44.1]  Chest pain, unspecified type [R07.9]                   Date / Time: 5/4/2025 10:51 AM    Patient Admission Status: Inpatient   Readmission Risk (Low < 19, Mod (19-27), High > 27): Readmission Risk Score: 10.7    Current PCP: Nehemias Ayala, DO  PCP verified by CM? Yes (Sue Chappell at [facility])    Chart Reviewed: Yes      History Provided by: Patient, Child/Family  Patient Orientation: Alert and Oriented    Patient Cognition: Alert    Hospitalization in the last 30 days (Readmission):  No    If yes, Readmission Assessment in CM Navigator will be completed.    Advance Directives:      Code Status: Full Code   Patient's Primary Decision Maker is: Legal Next of Kin    Primary Decision Maker: Elvin Llanes - Child - [phone]    Secondary Decision Maker: Parvin Rivera - Grandchild - [phone]    Discharge Planning:    Patient lives with: Children, Family Members Type of Home: House  Primary Care Giver:    Patient Support Systems include: Family Members   Current Financial resources:    Current community resources:    Current services prior to admission: None            Current DME:              Type of Home Care services:  None    ADLS  Prior functional level: Assistance with the following:, Shopping  Current functional level: Assistance with the following:, Shopping      Family can provide assistance at DC: Yes  Would you like Case Management to discuss the discharge plan with any other family members/significant others, and if so, who? No  Plans to Return to Present Housing: Yes  Other Identified

## 2025-05-06 NOTE — PLAN OF CARE
Problem: Discharge Planning  Goal: Discharge to home or other facility with appropriate resources  5/6/2025 1133 by Jammie Regalado, RN  Outcome: Progressing  5/6/2025 0040 by Olamide Petit, RN  Outcome: Progressing     Problem: Safety - Adult  Goal: Free from fall injury  5/6/2025 1133 by Jammie Regalado, RN  Outcome: Progressing  5/6/2025 0040 by Olamide Petit, RN  Outcome: Progressing

## 2025-05-06 NOTE — ACP (ADVANCE CARE PLANNING)
Advance Care Planning   Healthcare Decision Maker:    Primary Decision Maker: Elvin Llanes - Child - [phone]    Secondary Decision Maker: Parvin Rivera - Grandchild - [phone]

## 2025-05-06 NOTE — PROGRESS NOTES
Date:  5/6/2025  Patient: Ruddy Llanes  Admission:  5/4/2025 10:51 AM  Admit DX: Shortness of breath [R06.02]  COPD exacerbation (HCC) [J44.1]  Chest pain, unspecified type [R07.9]  Age:  74 y.o., 1950     LOS: 2 days       SUBJECTIVE:    Patient seen and examined  She is doing fairly well  Denies any cardiac symptoms  No discomfort at the site of the catheterization          OBJECTIVE:    BP (!) 112/53   Pulse 68   Temp 98.2 °F (36.8 °C)   Resp 20   Ht 1.651 m (5' 5\")   Wt 46 kg (101 lb 6.4 oz)   SpO2 97%   BMI 16.87 kg/m²     Intake/Output Summary (Last 24 hours) at 5/6/2025 0819  Last data filed at 5/5/2025 2335  Gross per 24 hour   Intake --   Output 905 ml   Net -905 ml       EXAM:       General: Alert and oriented, No acute distress.  Appears as stated age.  Eye:  Pupils are equal, round and reactive to light, Extraocular movements are intact, Normal conjunctiva.    Head: Normocephalic, Normal hearing, Oral mucosa is moist.  Neck: Supple, No carotid bruit, No jugular venous distention, No lymphadenopathy.  Respiratory: Diminished air exchange secondary to COPD, Respirations are non-labored, Breath sounds are equal, Symmetrical chest wall expansion.  Cardiovascular: Normal rate, No murmur, No gallop, Good pulses equal in all extremities, No edema.  Gastrointestinal: Soft, Non-tender, Non-distended, Normal bowel sounds.  Musculoskeletal: Normal strength, No tenderness, No deformity.  Integumentary:  Warm, Dry.    Neurologic: Alert, Oriented, No focal deficits.  Cognition and Speech: Oriented, Speech clear and coherent.  Psychiatric: Cooperative, Appropriate mood & affect, Normal judgment.        Current Inpatient Medications:   sodium chloride flush  5-40 mL IntraVENous 2 times per day    clopidogrel  75 mg Oral Daily    metoprolol succinate  25 mg Oral Daily    losartan  12.5 mg Oral Daily    nicotine  1 patch TransDERmal Daily    sodium chloride flush  5-40 mL IntraVENous 2 times per day

## 2025-05-06 NOTE — DISCHARGE INSTRUCTIONS
Cardiac Rehabilitation: Discharge instructions        Cardiac rehabilitation is a program for people who have a heart problem, such as a heart attack, coronary stent placed, heart failure, or a heart valve disease. The program includes exercise, lifestyle changes, education, and emotional support. Cardiac rehab can help you improve the quality of your life through better overall health. It can help you lose weight and feel better about yourself.    On your cardiac rehab team, you may have your doctor, a nurse specialist, an exercise physiologist, and a dietitian. They will design your cardiac rehab program specifically for you. You will learn how to reduce your risk for heart problems, how to manage stress, and how to eat a heart-healthy diet. By the end of the program, you will be ready to maintain a healthier lifestyle on your own.    Follow-up care is a key part of your treatment and safety. Be sure to make and go to all appointments, and call your doctor if you are having problems. It's also a good idea to know your test results and keep a list of the medicines you take.    Please call to schedule your first appointment once you have been cleared by your cardiologist to attend Phase II Outpatient Cardiac Rehabilitation.       Cardiac Rehabilitation Options:  [facility]  Cardiac Rehab  [address], [place]                                                                                                   P- [phone]                                                                                           Hours: M/W/F 6am-5pm T/Th 7-3    [facility] Cardiac Rehab             [facility]  [address] [facility]                                                                                          Cardiology Services  [place], [place]

## 2025-05-06 NOTE — PROGRESS NOTES
Spiritual Health History and Assessment/Progress Note  [facility]    Initial Encounter, Spiritual/Emotional Needs, Rituals, Rites and Sacraments,  Encounter (Fr. Carson),  ,  ,      Name: Ruddy Llanes MRN: [id number]    Age: 74 y.o.     Sex: female   Language: English   Evangelical: Restorationist   COPD exacerbation (HCC)     Date: 5/6/2025                           Spiritual Assessment began in [facility] PIC/ICU        Referral/Consult From: Rounding   Encounter Overview/Reason: Initial Encounter, Spiritual/Emotional Needs, Rituals, Rites and Sacraments,  Encounter (Fr. Carson)  Service Provided For: Patient    Yulisa, Belief, Meaning:   Patient unable to assess at this time  Family/Friends No family/friends present      Importance and Influence:  Patient unable to assess at this time  Family/Friends No family/friends present    Community:  Patient Other: unknown  Family/Friends No family/friends present    Assessment and Plan of Care:     Patient Interventions include: Facilitated expression of thoughts and feelings and Provided sacramental/Synagogue ritual  Family/Friends Interventions include: No family/friends present    Patient Plan of Care: No spiritual needs identified for follow-up and Spiritual Care available upon further referral  Family/Friends Plan of Care: No family/friends present    Electronically signed by Chaplain Kalyani on 5/6/2025 at 1:37 PM

## 2025-05-06 NOTE — PROGRESS NOTES
Pulse ox was ___90___ % on room air at rest.   Ambulated patient on room air.     Oxygen saturation was ____87___ % on room air while ambulating. (lowest saturation reached)   Oxygen applied.     Recovery pulse ox was ______95_% on __3__ liters of oxygen while ambulating.

## 2025-05-06 NOTE — PROGRESS NOTES
[facility] Hospitalist Progress Note    Admitting Date and Time: 5/4/2025 10:51 AM  Admit Dx: Shortness of breath [R06.02]  COPD exacerbation (HCC) [J44.1]  Chest pain, unspecified type [R07.9]      Assessment:    Principal Problem:    COPD exacerbation (HCC)  Active Problems:    Coronary artery disease of native artery of native heart with stable angina pectoris    Hyperlipidemia    Anxiety    Chronic respiratory failure with hypoxia (HCC)    Drug abuse (HCC)    Chest pain  Resolved Problems:    * No resolved hospital problems. *      Plan:  5/4/2025  Impression:  Principal Problem:    COPD exacerbation (HCC)  Active Problems:    Coronary artery disease of native artery of native heart with stable angina pectoris    Hyperlipidemia    Anxiety    Chronic respiratory failure with hypoxia (HCC)    Drug abuse (HCC)  Resolved Problems:    * No resolved hospital problems. *        My findings/plan include:  Chest pain has been getting more consistent with exercise or walking with going up and down the steps.  Patient did have 2 stents in 2008 and she has not followed with the Dr. Mayo except once right after the stent.  Then no other follow-ups.  Wound consult Dr. Mayo will get a 2D echo.  Continue her aspirin daily.  COPD exacerbation will place her on Solu-Medrol and aerosol treatments , Zithromax and Rocephin.  On oxygen she is already on oxygen at home at 3 L baseline.  Tobacco abuse patient still smokes half a pack a day.  I discussed with her about quitting smoking.  Will start her on nicotine patch.  Drug abuse she has been on Suboxone for a long time.  Will restart with the same dose.  Will check urine drug screen.  Anxiety I will start her on BuSpar continue her Cymbalta.  Lovenox for DVT/prophylaxis  Full code  Anticipate discharge home in 2 to 3 days    5/5/2025  --on rocephin and azithromycin for copd exacerbation  --scheduled and prn bronchodilators  --on iv steroids, wean as able  --encourage

## 2025-05-07 NOTE — PROGRESS NOTES
CLINICAL PHARMACY NOTE: MEDS TO BEDS    Total # of Prescriptions Filled: 10   The following medications were delivered to the patient:  Buspirone 5 mg  Losartan 25 mg  Cefdinir 300 mg  Metoprolol succinate 25 mg  Nitroglycerin 0.4 mg  Prednisone 10 mg  Nicotine step 2 14 mg/24 hr patch  Clopidogrel 75 mg  Azithromycin 500 mg  Tramadol 50 mg    Additional Documentation:

## 2025-05-13 NOTE — PROGRESS NOTES
Physician Progress Note      PATIENT:               PEDRO GROVE  [facility] #:                  [id number]  :                       1950  ADMIT DATE:       2025 10:51 AM  DISCH DATE:        2025 6:47 PM  RESPONDING  PROVIDER #:        Moon Rodríguez DO          QUERY TEXT:    Chest pain is documented in the medical record on Discharge Summary by   Moon Rodríguez DO at 2025. Please specify the underlying cause:    The clinical indicators include:  -\"Chest pain  -ACS rules out in ER)H&P    -\"New onset chest pain that may represent unstable angina  Known coronary disease  Previous stenting of RCA done in year   She is expected to have recurrence of her coronary disease because she has   been smoking without stop since  and previous to that\" (cardio consult   )    -\"Acute coronary syndrome  Unstable angina  Critical coronary disease of RCA and significant disease of LAD  Post successful PCI of in-stent stenosis of RCA\" (CArdio Consult )    -\"Chest pain has been getting more consistent with exercise or walking with   going up and down the steps.  Patient did have 2 stents in  and she has   not followed with the Dr. Mayo except once right after the stent.  Then no   other follow-ups.  Wound consult Dr. Mayo will get a 2D echo.  Continue her   aspirin daily.(Discharge Summary)    -Troponin - 6>8>7(EPIC)    -Left anterior descending artery: 80% focal mid stenosis. Large artery. Long.   Wraps around the apex.  Right coronary artery: Dominant. Severe 95% focal proximal in-stent stenosis   followed by diffuse 50% in-stent stenosis. The rest of the RCA is free of   disease.  Left ventriculogram: Mild global LV dysfunction. Visually estimated LVEF is   around 45%. No significant mitral regurgitation.  Hemodynamics: LV end-diastolic pressure is 30-35 mmHg. No gradient is present   across aortic valve. (Cardiac Cath )    -Asprin(MAR)  Options provided:  -- Chest pain

## 2025-05-14 ENCOUNTER — TELEPHONE (OUTPATIENT)
Dept: PULMONOLOGY | Age: 75
End: 2025-05-14

## 2025-05-14 NOTE — TELEPHONE ENCOUNTER
Tried calling patient multiple times, her mailbox is full and not accepting any messages. (Dr. Chappell, COPD)

## 2025-05-27 ENCOUNTER — TELEPHONE (OUTPATIENT)
Dept: CARDIOLOGY CLINIC | Age: 75
End: 2025-05-27

## 2025-05-27 NOTE — TELEPHONE ENCOUNTER
Voicemail not set up; unable to lm for pt to call office to r/s appt of 5/28/25 since Dr Levin has to cover at [facility] this week, thank you.

## 2025-05-27 NOTE — TELEPHONE ENCOUNTER
I called pt's son, Elvin, he will have pt call office and tell her that her vm is full and I was unable to lm for pt to call office to r/s appt of 5/28/25 since Dr Levin is at [facility] this week, thank you.

## 2025-05-28 NOTE — PROGRESS NOTES
[facility]                                                                                                                    PRE OP INSTRUCTIONS FOR  Ruddy Llanes        Date: 5/28/2025    Date of surgery: 5/29/25   Arrival Time: Hospital will call you between 5pm and 7pm with your final arrival time for surgery. Go to     front of hospital and check in at information desk.    Nothing by mouth (NPO) as instructed. May have clear liquids up to 2 hours prior to surgery. Nothing solid after midnight. Examples: water, apple juice, black coffee, plain tea    Take the following medications with a small sip of water on the morning of Surgery: metoprolol,plavix,losartan, suboxone, use inhaler ( ran out of rescue inhaler)     Aspirin, Ibuprofen, Advil, Naproxen, other Anti-inflammatory products should be stopped before surgery as directed by your surgeon, cardiologist, or primary care Doctor. Herbal supplements and Vitamin E should be stopped five days prior.  May take Tylenol unless instructed otherwise by your surgeon.    Check with your Doctor regarding stopping Plavix, Coumadin, Lovenox, Eliquis, Effient, or other blood thinners such as, pradaxa, lixiana, xaralto and savaysa.    Do not smoke, chew tobacco, vape, or use illicit drugs and do not drink any alcoholic beverages 24 hours prior to surgery.    You may brush your teeth the morning of surgery.      You MUST make arrangements for a responsible adult, 18 and over, to take you home after your surgery. You will not be allowed to leave alone or drive yourself home.  You will need someone stay with you the first 24 hrs. Your surgery will be cancelled if you do not have a ride home or someone to stay with you.      Please wear simple, loose fitting clothing to the hospital.  Do not bring valuables (money, credit cards, checkbooks, etc.) Do not wear any makeup (including no eye makeup) or nail polish on your fingers or toes.    DO NOT wear

## 2025-05-29 ENCOUNTER — HOSPITAL ENCOUNTER (OUTPATIENT)
Age: 75
Setting detail: OUTPATIENT SURGERY
Discharge: HOME | End: 2025-05-29
Attending: INTERNAL MEDICINE | Admitting: INTERNAL MEDICINE
Payer: COMMERCIAL

## 2025-05-29 ENCOUNTER — TELEPHONE (OUTPATIENT)
Dept: CARDIOLOGY CLINIC | Age: 75
End: 2025-05-29

## 2025-05-29 VITALS
BODY MASS INDEX: 17.23 KG/M2 | TEMPERATURE: 98.2 F | DIASTOLIC BLOOD PRESSURE: 50 MMHG | HEIGHT: 65 IN | WEIGHT: 103.4 LBS | HEART RATE: 66 BPM | RESPIRATION RATE: 16 BRPM | OXYGEN SATURATION: 97 % | SYSTOLIC BLOOD PRESSURE: 104 MMHG

## 2025-05-29 DIAGNOSIS — I25.10 CAD (CORONARY ARTERY DISEASE): ICD-10-CM

## 2025-05-29 LAB
ACTIVATED CLOTTING TIME, LOW RANGE: 293 SEC
ANION GAP SERPL CALCULATED.3IONS-SCNC: 8 MMOL/L (ref 7–16)
BASOPHILS # BLD: 0.03 K/UL (ref 0–0.2)
BASOPHILS NFR BLD: 0 % (ref 0–2)
BUN SERPL-MCNC: 16 MG/DL (ref 6–23)
CALCIUM SERPL-MCNC: 10.6 MG/DL (ref 8.6–10.2)
CHLORIDE SERPL-SCNC: 105 MMOL/L (ref 98–107)
CO2 SERPL-SCNC: 24 MMOL/L (ref 22–29)
CREAT SERPL-MCNC: 0.6 MG/DL (ref 0.5–1)
EKG ATRIAL RATE: 60 BPM
EKG P AXIS: 25 DEGREES
EKG P-R INTERVAL: 192 MS
EKG Q-T INTERVAL: 440 MS
EKG QRS DURATION: 86 MS
EKG QTC CALCULATION (BAZETT): 440 MS
EKG R AXIS: 61 DEGREES
EKG T AXIS: 66 DEGREES
EKG VENTRICULAR RATE: 60 BPM
EOSINOPHIL # BLD: 0.1 K/UL (ref 0.05–0.5)
EOSINOPHILS RELATIVE PERCENT: 1 % (ref 0–6)
ERYTHROCYTE [DISTWIDTH] IN BLOOD BY AUTOMATED COUNT: 14.6 % (ref 11.5–15)
GFR, ESTIMATED: >90 ML/MIN/1.73M2
GLUCOSE SERPL-MCNC: 94 MG/DL (ref 74–99)
HCT VFR BLD AUTO: 35.8 % (ref 34–48)
HGB BLD-MCNC: 11.4 G/DL (ref 11.5–15.5)
IMM GRANULOCYTES # BLD AUTO: <0.03 K/UL (ref 0–0.58)
IMM GRANULOCYTES NFR BLD: 0 % (ref 0–5)
LYMPHOCYTES NFR BLD: 1.52 K/UL (ref 1.5–4)
LYMPHOCYTES RELATIVE PERCENT: 14 % (ref 20–42)
MCH RBC QN AUTO: 31.1 PG (ref 26–35)
MCHC RBC AUTO-ENTMCNC: 31.8 G/DL (ref 32–34.5)
MCV RBC AUTO: 97.8 FL (ref 80–99.9)
MONOCYTES NFR BLD: 0.62 K/UL (ref 0.1–0.95)
MONOCYTES NFR BLD: 6 % (ref 2–12)
NEUTROPHILS NFR BLD: 79 % (ref 43–80)
NEUTS SEG NFR BLD: 8.51 K/UL (ref 1.8–7.3)
PLATELET # BLD AUTO: 209 K/UL (ref 130–450)
PMV BLD AUTO: 9.7 FL (ref 7–12)
POTASSIUM SERPL-SCNC: 4.1 MMOL/L (ref 3.5–5)
RBC # BLD AUTO: 3.66 M/UL (ref 3.5–5.5)
SODIUM SERPL-SCNC: 137 MMOL/L (ref 132–146)
WBC OTHER # BLD: 10.8 K/UL (ref 4.5–11.5)

## 2025-05-29 PROCEDURE — 99152 MOD SED SAME PHYS/QHP 5/>YRS: CPT | Performed by: INTERNAL MEDICINE

## 2025-05-29 PROCEDURE — 7100000010 HC PHASE II RECOVERY - FIRST 15 MIN: Performed by: INTERNAL MEDICINE

## 2025-05-29 PROCEDURE — 7100000000 HC PACU RECOVERY - FIRST 15 MIN: Performed by: INTERNAL MEDICINE

## 2025-05-29 PROCEDURE — C9601 PERC DRUG-EL COR STENT BRAN: HCPCS | Performed by: INTERNAL MEDICINE

## 2025-05-29 PROCEDURE — C9600 PERC DRUG-EL COR STENT SING: HCPCS | Performed by: INTERNAL MEDICINE

## 2025-05-29 PROCEDURE — 6360000002 HC RX W HCPCS: Performed by: INTERNAL MEDICINE

## 2025-05-29 PROCEDURE — C1874 STENT, COATED/COV W/DEL SYS: HCPCS | Performed by: INTERNAL MEDICINE

## 2025-05-29 PROCEDURE — 2709999900 HC NON-CHARGEABLE SUPPLY: Performed by: INTERNAL MEDICINE

## 2025-05-29 PROCEDURE — 7100000001 HC PACU RECOVERY - ADDTL 15 MIN: Performed by: INTERNAL MEDICINE

## 2025-05-29 PROCEDURE — 80048 BASIC METABOLIC PNL TOTAL CA: CPT

## 2025-05-29 PROCEDURE — 2580000003 HC RX 258: Performed by: INTERNAL MEDICINE

## 2025-05-29 PROCEDURE — 93005 ELECTROCARDIOGRAM TRACING: CPT

## 2025-05-29 PROCEDURE — 99153 MOD SED SAME PHYS/QHP EA: CPT | Performed by: INTERNAL MEDICINE

## 2025-05-29 PROCEDURE — C1769 GUIDE WIRE: HCPCS | Performed by: INTERNAL MEDICINE

## 2025-05-29 PROCEDURE — 2500000003 HC RX 250 WO HCPCS: Performed by: INTERNAL MEDICINE

## 2025-05-29 PROCEDURE — C1887 CATHETER, GUIDING: HCPCS | Performed by: INTERNAL MEDICINE

## 2025-05-29 PROCEDURE — C1725 CATH, TRANSLUMIN NON-LASER: HCPCS | Performed by: INTERNAL MEDICINE

## 2025-05-29 PROCEDURE — C1894 INTRO/SHEATH, NON-LASER: HCPCS | Performed by: INTERNAL MEDICINE

## 2025-05-29 PROCEDURE — 85025 COMPLETE CBC W/AUTO DIFF WBC: CPT

## 2025-05-29 PROCEDURE — 6370000000 HC RX 637 (ALT 250 FOR IP): Performed by: INTERNAL MEDICINE

## 2025-05-29 PROCEDURE — 7100000011 HC PHASE II RECOVERY - ADDTL 15 MIN: Performed by: INTERNAL MEDICINE

## 2025-05-29 PROCEDURE — 85347 COAGULATION TIME ACTIVATED: CPT

## 2025-05-29 PROCEDURE — 6360000004 HC RX CONTRAST MEDICATION: Performed by: INTERNAL MEDICINE

## 2025-05-29 DEVICE — STENT ONYXNG35018UX ONYX 3.50X18RX
Type: IMPLANTABLE DEVICE | Status: FUNCTIONAL
Brand: ONYX FRONTIER™

## 2025-05-29 DEVICE — STENT ONYXNG30038UX ONYX 3.00X38RX
Type: IMPLANTABLE DEVICE | Status: FUNCTIONAL
Brand: ONYX FRONTIER™

## 2025-05-29 DEVICE — STENT ONYXNG25008UX ONYX 2.50X08RX
Type: IMPLANTABLE DEVICE | Status: FUNCTIONAL
Brand: ONYX FRONTIER™

## 2025-05-29 RX ORDER — SODIUM CHLORIDE 0.9 % (FLUSH) 0.9 %
5-40 SYRINGE (ML) INJECTION EVERY 12 HOURS SCHEDULED
Status: DISCONTINUED | OUTPATIENT
Start: 2025-05-29 | End: 2025-05-29 | Stop reason: HOSPADM

## 2025-05-29 RX ORDER — CLOPIDOGREL BISULFATE 75 MG/1
TABLET ORAL PRN
Status: DISCONTINUED | OUTPATIENT
Start: 2025-05-29 | End: 2025-05-29 | Stop reason: HOSPADM

## 2025-05-29 RX ORDER — ASPIRIN 81 MG/1
TABLET, CHEWABLE ORAL PRN
Status: DISCONTINUED | OUTPATIENT
Start: 2025-05-29 | End: 2025-05-29 | Stop reason: HOSPADM

## 2025-05-29 RX ORDER — ACETAMINOPHEN 325 MG/1
650 TABLET ORAL EVERY 4 HOURS PRN
Status: DISCONTINUED | OUTPATIENT
Start: 2025-05-29 | End: 2025-05-29 | Stop reason: HOSPADM

## 2025-05-29 RX ORDER — IOPAMIDOL 755 MG/ML
INJECTION, SOLUTION INTRAVASCULAR PRN
Status: DISCONTINUED | OUTPATIENT
Start: 2025-05-29 | End: 2025-05-29 | Stop reason: HOSPADM

## 2025-05-29 RX ORDER — MIDAZOLAM HYDROCHLORIDE 1 MG/ML
INJECTION, SOLUTION INTRAMUSCULAR; INTRAVENOUS PRN
Status: DISCONTINUED | OUTPATIENT
Start: 2025-05-29 | End: 2025-05-29 | Stop reason: HOSPADM

## 2025-05-29 RX ORDER — NITROGLYCERIN 20 MG/100ML
INJECTION INTRAVENOUS CONTINUOUS PRN
Status: COMPLETED | OUTPATIENT
Start: 2025-05-29 | End: 2025-05-29

## 2025-05-29 RX ORDER — SODIUM CHLORIDE 9 MG/ML
INJECTION, SOLUTION INTRAVENOUS PRN
Status: DISCONTINUED | OUTPATIENT
Start: 2025-05-29 | End: 2025-05-29 | Stop reason: HOSPADM

## 2025-05-29 RX ORDER — SODIUM CHLORIDE 0.9 % (FLUSH) 0.9 %
5-40 SYRINGE (ML) INJECTION PRN
Status: DISCONTINUED | OUTPATIENT
Start: 2025-05-29 | End: 2025-05-29 | Stop reason: HOSPADM

## 2025-05-29 RX ORDER — SODIUM CHLORIDE 9 MG/ML
INJECTION, SOLUTION INTRAVENOUS CONTINUOUS
Status: DISCONTINUED | OUTPATIENT
Start: 2025-05-29 | End: 2025-05-29 | Stop reason: HOSPADM

## 2025-05-29 RX ADMIN — SODIUM CHLORIDE: 0.9 INJECTION, SOLUTION INTRAVENOUS at 09:30

## 2025-05-29 ASSESSMENT — PAIN - FUNCTIONAL ASSESSMENT
PAIN_FUNCTIONAL_ASSESSMENT: NONE - DENIES PAIN

## 2025-05-29 NOTE — PROGRESS NOTES
Patient came down to special procedures for cardiac catheterization with Dr. Mayo.       Total amount of sedation medication given during procedure: 1 mg of IV Versed.    11 cc of air in right Radial band and Radial pulse is +2.  Right wrist to remain straight, patient not to bend wrist.    1304 - 15 minute post procedure VS BP 99/44 HR 60 R 18 98% 3LNC    Right hand color wnl, no bleeding noted,  pulse of +2, no numbness or tingling     Patient is alert and oriented x 4     nurse to nurse called, spoke with Mickie RN, nurse notified of above information.  Nurse assumed post sedation vital signs    Patient transported back to the ACC.

## 2025-05-29 NOTE — DISCHARGE INSTRUCTIONS
Post-Cath Radial Approach Discharge Instructions    Please follow instructions closely for prevention of complications.    Instructions for care of catheterization site:  RADIAL (WRIST)  Approach:    DO NOT BEND wrist for 48 hours  DO NOT PUSH with affected wrist for 48 hours  DO NOT submerge wrist in water for 3 days  NO Blood Pressure, IV, or blood work in affected arm for 3 - 5 days      KEEP SPLINT (ARMBOARD) FOR 24 HOURS then...Remove dressing from wrist.  Gently cleanse, pat dry, apply band-aid for 24 hours        Call your physician if you notice:    *Increase in pain at catheterization site  *Increase in swelling at catheterization site  *Redness or drainage at the catheterization site  *Numbness, tingling or cramping in the catheterization arm at rest or with activity      CALL 911 if you have active bleeding from your catheterization site.      DO NOT DRIVE YOURSELF TO THE HOSPITAL      Drink 3 - 4 extra glasses of water today    No driving, or working for 48 hours    Follow up with Primary Care Physician in 2 weeks    Follow up with Cardiologist in 2 - 4 weeks    Please call to make your appointments after you are discharged

## 2025-05-29 NOTE — TELEPHONE ENCOUNTER
2nd call, mailbox full, unable to lm. On 5/27/25 I called and vm full so I called her son, Elvin and he said he'd tell pt to call office to r/s appt of 5/28/25 d/t  at [facility] that day,thank you.

## 2025-05-29 NOTE — CONSULTS
Met with patient and discussed that their physician has ordered a referral to our outpatient Phase II Cardiac Rehabilitation program. Reviewed the benefits of cardiac rehabilitation based on their diagnosis and personal risk factors. Patient demonstrates moderate interest in Cardiac Rehabilitation at this time.  Cardiac Rehabilitation brochure provided to patient/family. The Cardiac Rehabilitation Program has been provided the patient's referral information and pertinent patient details and history. The patient may call [facility] Cardiac Rehabilitation at [phone] for additional information or questions. Contact information for [facility] Cardiac Rehabilitation and other choices close to the patient's residence have been provided in the discharge instructions so that the patient may call and schedule an appointment when cleared by their physician. Thank you for the referral.

## 2025-05-30 ENCOUNTER — TELEPHONE (OUTPATIENT)
Dept: CARDIOLOGY CLINIC | Age: 75
End: 2025-05-30

## 2025-05-30 NOTE — TELEPHONE ENCOUNTER
Obey for pt to call to r/s appt of 5/28/25 that was cancelled d/t dr being called to [facility] for the week. Previous calls found that home phone vm is full, her son, Elvin was called and said that he would have pt call office but we never got a call.

## 2025-06-01 LAB — ECHO BSA: 1.47 M2

## 2025-06-02 LAB
EKG ATRIAL RATE: 60 BPM
EKG P AXIS: 25 DEGREES
EKG P-R INTERVAL: 192 MS
EKG Q-T INTERVAL: 440 MS
EKG QRS DURATION: 86 MS
EKG QTC CALCULATION (BAZETT): 440 MS
EKG R AXIS: 61 DEGREES
EKG T AXIS: 66 DEGREES
EKG VENTRICULAR RATE: 60 BPM

## 2025-06-14 ENCOUNTER — APPOINTMENT (OUTPATIENT)
Dept: GENERAL RADIOLOGY | Age: 75
End: 2025-06-14
Payer: COMMERCIAL

## 2025-06-14 ENCOUNTER — HOSPITAL ENCOUNTER (EMERGENCY)
Age: 75
Discharge: HOME | End: 2025-06-14
Attending: EMERGENCY MEDICINE
Payer: COMMERCIAL

## 2025-06-14 VITALS
SYSTOLIC BLOOD PRESSURE: 106 MMHG | DIASTOLIC BLOOD PRESSURE: 65 MMHG | OXYGEN SATURATION: 95 % | RESPIRATION RATE: 19 BRPM | TEMPERATURE: 97.8 F | HEART RATE: 64 BPM

## 2025-06-14 DIAGNOSIS — E86.0 DEHYDRATION: Primary | ICD-10-CM

## 2025-06-14 LAB
ANION GAP SERPL CALCULATED.3IONS-SCNC: 9 MMOL/L (ref 7–16)
BASOPHILS # BLD: 0.06 K/UL (ref 0–0.2)
BASOPHILS NFR BLD: 1 % (ref 0–2)
BUN SERPL-MCNC: 19 MG/DL (ref 8–23)
CALCIUM SERPL-MCNC: 10.2 MG/DL (ref 8.8–10.2)
CHLORIDE SERPL-SCNC: 105 MMOL/L (ref 98–107)
CO2 SERPL-SCNC: 26 MMOL/L (ref 22–29)
CREAT SERPL-MCNC: 0.7 MG/DL (ref 0.5–1)
EOSINOPHIL # BLD: 0.09 K/UL (ref 0.05–0.5)
EOSINOPHILS RELATIVE PERCENT: 1 % (ref 0–6)
ERYTHROCYTE [DISTWIDTH] IN BLOOD BY AUTOMATED COUNT: 14.2 % (ref 11.5–15)
GFR, ESTIMATED: >90 ML/MIN/1.73M2
GLUCOSE SERPL-MCNC: 112 MG/DL (ref 74–99)
HCT VFR BLD AUTO: 35.1 % (ref 34–48)
HGB BLD-MCNC: 11.1 G/DL (ref 11.5–15.5)
IMM GRANULOCYTES # BLD AUTO: 0.03 K/UL (ref 0–0.58)
IMM GRANULOCYTES NFR BLD: 0 % (ref 0–5)
LYMPHOCYTES NFR BLD: 1.61 K/UL (ref 1.5–4)
LYMPHOCYTES RELATIVE PERCENT: 18 % (ref 20–42)
MCH RBC QN AUTO: 31.4 PG (ref 26–35)
MCHC RBC AUTO-ENTMCNC: 31.6 G/DL (ref 32–34.5)
MCV RBC AUTO: 99.4 FL (ref 80–99.9)
MONOCYTES NFR BLD: 0.72 K/UL (ref 0.1–0.95)
MONOCYTES NFR BLD: 8 % (ref 2–12)
NEUTROPHILS NFR BLD: 72 % (ref 43–80)
NEUTS SEG NFR BLD: 6.56 K/UL (ref 1.8–7.3)
PLATELET # BLD AUTO: 317 K/UL (ref 130–450)
PMV BLD AUTO: 9.9 FL (ref 7–12)
POTASSIUM SERPL-SCNC: 3.7 MMOL/L (ref 3.5–5.1)
POTASSIUM SERPL-SCNC: 3.9 MMOL/L (ref 3.5–5.1)
POTASSIUM SERPL-SCNC: 4.2 MMOL/L (ref 3.5–5.1)
RBC # BLD AUTO: 3.53 M/UL (ref 3.5–5.5)
SODIUM SERPL-SCNC: 140 MMOL/L (ref 136–145)
WBC OTHER # BLD: 9.1 K/UL (ref 4.5–11.5)

## 2025-06-14 PROCEDURE — 93005 ELECTROCARDIOGRAM TRACING: CPT

## 2025-06-14 PROCEDURE — 2580000003 HC RX 258

## 2025-06-14 PROCEDURE — 96361 HYDRATE IV INFUSION ADD-ON: CPT

## 2025-06-14 PROCEDURE — 71045 X-RAY EXAM CHEST 1 VIEW: CPT

## 2025-06-14 PROCEDURE — 99285 EMERGENCY DEPT VISIT HI MDM: CPT

## 2025-06-14 PROCEDURE — 96360 HYDRATION IV INFUSION INIT: CPT

## 2025-06-14 PROCEDURE — 80048 BASIC METABOLIC PNL TOTAL CA: CPT

## 2025-06-14 PROCEDURE — 85025 COMPLETE CBC W/AUTO DIFF WBC: CPT

## 2025-06-14 PROCEDURE — 84132 ASSAY OF SERUM POTASSIUM: CPT

## 2025-06-14 RX ORDER — 0.9 % SODIUM CHLORIDE 0.9 %
1000 INTRAVENOUS SOLUTION INTRAVENOUS ONCE
Status: COMPLETED | OUTPATIENT
Start: 2025-06-14 | End: 2025-06-14

## 2025-06-14 RX ADMIN — SODIUM CHLORIDE 1000 ML: 0.9 INJECTION, SOLUTION INTRAVENOUS at 16:24

## 2025-06-14 ASSESSMENT — PAIN - FUNCTIONAL ASSESSMENT: PAIN_FUNCTIONAL_ASSESSMENT: NONE - DENIES PAIN

## 2025-06-14 NOTE — ED PROVIDER NOTES
advised her to drink more water and decrease her cola intake.  NIH is 0 no neurologic symptoms.  I think she stable for discharge with return precautions.    Clinical Impression  1. Dehydration         Disposition  Patient's disposition: Discharge to home    Helen Quintero MD

## 2025-06-15 LAB
EKG ATRIAL RATE: 62 BPM
EKG P AXIS: 68 DEGREES
EKG P-R INTERVAL: 188 MS
EKG Q-T INTERVAL: 438 MS
EKG QRS DURATION: 90 MS
EKG QTC CALCULATION (BAZETT): 444 MS
EKG R AXIS: 69 DEGREES
EKG T AXIS: 54 DEGREES
EKG VENTRICULAR RATE: 62 BPM

## 2025-06-15 PROCEDURE — 93010 ELECTROCARDIOGRAM REPORT: CPT | Performed by: INTERNAL MEDICINE

## 2025-06-15 NOTE — DISCHARGE INSTRUCTIONS
Please return to the emergency department for medical emergencies.  Please follow-up with PCP.  Please take your medications as prescribed.  Please drink more water and decrease: Take    XR CHEST PORTABLE   Final Result   1. No acute cardiopulmonary process.   2. Emphysema.

## 2025-06-17 ENCOUNTER — TELEPHONE (OUTPATIENT)
Dept: CARDIAC REHAB | Age: 75
End: 2025-06-17

## 2025-06-17 NOTE — TELEPHONE ENCOUNTER
Tried calling patient for Cardiac Rehab  - No Answer - Mail box full 05/12/25, 05/14/25 06/16/25   Notified Dr. Mayo

## (undated) DEVICE — GUIDEWIRE VASC L260CM DIA0.035IN TIP L3MM STD EXCHG PTFE J

## (undated) DEVICE — HEAD AND NECK PACK: Brand: CONVERTORS

## (undated) DEVICE — CANNULA NSL CANN NSL L25FT TBNG AD O2 SUP SFT UC

## (undated) DEVICE — RUNTHROUGH NS EXTRA FLOPPY PTCA GUIDEWIRE: Brand: RUNTHROUGH

## (undated) DEVICE — DRESSING,GAUZE,XEROFORM,CURAD,1"X8",ST: Brand: CURAD

## (undated) DEVICE — TOWEL OR BLUEE 16X26IN ST 8 PACK ORB08 16X26ORTWL

## (undated) DEVICE — HEARTRAIL III GUIDING CATHETER: Brand: HEARTRAIL

## (undated) DEVICE — COUNTER NDL 30 COUNT DBL MAG

## (undated) DEVICE — VALVE ANGIO Y VLV HEMSTAT W INSRT TOOL MTL ST L BOR

## (undated) DEVICE — KIT HND CTRL AT-XL65

## (undated) DEVICE — DEVICE INFL 20ML 30ATM POLYCARB BRL ABS PLUNG DGT DISPLAY

## (undated) DEVICE — KIT ANGIO W/ AT P65 PREM HND CTRL FOR CNTRST DEL ANGIOTOUCH

## (undated) DEVICE — NEEDLE ANGIO 18GA L7CM 0038IN 1 WALL SELD SHLD UNIQUE HUB

## (undated) DEVICE — KIT MFLD ISOLATN NACL CNTRST PRT TBNG SPIK W/ PRSS TRNSDUC

## (undated) DEVICE — CIL FLEX 20 FLL-MLL - PKG: Brand: NAMIC

## (undated) DEVICE — CATHETER GUID 5FR L100CM DIA0.058IN NYL SHFT EBU3.75 W/O

## (undated) DEVICE — TR BAND RADIAL ARTERY COMPRESSION DEVICE: Brand: TR BAND

## (undated) DEVICE — CATH BLLN SCORING 3.5X15MM X 137CM PTCA ANGIOSCULPT RX

## (undated) DEVICE — MEDI-VAC SUCTION FINE CAPACITY: Brand: CARDINAL HEALTH

## (undated) DEVICE — SPONGE GZ W4XL4IN 12 PLY KERLIX

## (undated) DEVICE — GLOVE SURG SZ 7.5 L11.73IN FNGR THK9.8MIL STRW LTX POLYMER

## (undated) DEVICE — CYSTO/BLADDER IRRIGATION SET, REGULATING CLAMP

## (undated) DEVICE — CATH BLLN ANGIO 3X15MM SC EUPHORA RX

## (undated) DEVICE — Device

## (undated) DEVICE — PAD, DEFIB, ADULT, RADIOTRAN, PHYSIO, LO: Brand: MEDLINE

## (undated) DEVICE — CATH BLLN ANGIO 3X30MM SC EUPHORA RX

## (undated) DEVICE — BANDAGE COMPR W3INXL5YD BGE POLY COT E RECOVERABLE BRTH W/

## (undated) DEVICE — COVER,LIGHT HANDLE,FLX,1/PK: Brand: MEDLINE INDUSTRIES, INC.

## (undated) DEVICE — GLIDESHEATH SLENDER NITINOL HYDROPHILIC COATED INTRODUCER SHEATH: Brand: GLIDESHEATH SLENDER

## (undated) DEVICE — BASIC SINGLE BASIN 2-LF: Brand: MEDLINE INDUSTRIES, INC.

## (undated) DEVICE — BAND COMPR L24CM REG CLR PLAS HEMSTAT EXT HK AND LOOP RETEN

## (undated) DEVICE — CATHETER GUID 5.5FR L150CM RAP EXCHG L25CM TIP 4.8FR PUSH